# Patient Record
Sex: FEMALE | Race: WHITE | NOT HISPANIC OR LATINO | Employment: OTHER | URBAN - METROPOLITAN AREA
[De-identification: names, ages, dates, MRNs, and addresses within clinical notes are randomized per-mention and may not be internally consistent; named-entity substitution may affect disease eponyms.]

---

## 2017-01-17 ENCOUNTER — OFFICE VISIT (OUTPATIENT)
Dept: FAMILY MEDICINE CLINIC | Facility: CLINIC | Age: 72
End: 2017-01-17

## 2017-01-17 VITALS
HEART RATE: 77 BPM | BODY MASS INDEX: 38.98 KG/M2 | TEMPERATURE: 98.1 F | WEIGHT: 220 LBS | RESPIRATION RATE: 16 BRPM | OXYGEN SATURATION: 97 % | SYSTOLIC BLOOD PRESSURE: 110 MMHG | DIASTOLIC BLOOD PRESSURE: 60 MMHG | HEIGHT: 63 IN

## 2017-01-17 DIAGNOSIS — E55.9 VITAMIN D DEFICIENCY: ICD-10-CM

## 2017-01-17 DIAGNOSIS — I10 ESSENTIAL HYPERTENSION: Primary | ICD-10-CM

## 2017-01-17 DIAGNOSIS — E78.5 HYPERLIPIDEMIA, UNSPECIFIED HYPERLIPIDEMIA TYPE: ICD-10-CM

## 2017-01-17 DIAGNOSIS — I48.91 ATRIAL FIBRILLATION, UNSPECIFIED TYPE (HCC): ICD-10-CM

## 2017-01-17 LAB
25(OH)D3 SERPL-MCNC: 44.8 NG/ML (ref 30–100)
ALBUMIN SERPL-MCNC: 4.6 G/DL (ref 3.5–5.2)
ALBUMIN/GLOB SERPL: 2 G/DL
ALP SERPL-CCNC: 52 U/L (ref 39–117)
ALT SERPL W P-5'-P-CCNC: 11 U/L (ref 1–33)
ANION GAP SERPL CALCULATED.3IONS-SCNC: 16.3 MMOL/L
AST SERPL-CCNC: 11 U/L (ref 1–32)
BILIRUB SERPL-MCNC: 0.6 MG/DL (ref 0.1–1.2)
BUN BLD-MCNC: 13 MG/DL (ref 8–23)
BUN/CREAT SERPL: 15.9 (ref 7–25)
CALCIUM SPEC-SCNC: 10.3 MG/DL (ref 8.6–10.5)
CHLORIDE SERPL-SCNC: 100 MMOL/L (ref 98–107)
CHOLEST SERPL-MCNC: 170 MG/DL (ref 0–200)
CO2 SERPL-SCNC: 27.7 MMOL/L (ref 22–29)
CREAT BLD-MCNC: 0.82 MG/DL (ref 0.57–1)
ERYTHROCYTE [DISTWIDTH] IN BLOOD BY AUTOMATED COUNT: 13.2 % (ref 4.5–15)
GFR SERPL CREATININE-BSD FRML MDRD: 69 ML/MIN/1.73
GLOBULIN UR ELPH-MCNC: 2.3 GM/DL
GLUCOSE BLD-MCNC: 86 MG/DL (ref 65–99)
HCT VFR BLD AUTO: 42.6 % (ref 31–42)
HDLC SERPL-MCNC: 61 MG/DL (ref 40–60)
HGB BLD-MCNC: 13.6 G/DL (ref 12–18)
LDLC SERPL CALC-MCNC: 94 MG/DL (ref 0–100)
LDLC/HDLC SERPL: 1.55 {RATIO}
LYMPHOCYTES # BLD AUTO: 3.2 10*3/MM3 (ref 1.2–3.4)
LYMPHOCYTES NFR BLD AUTO: 42.3 % (ref 21–51)
MCH RBC QN AUTO: 29.2 PG (ref 26.1–33.1)
MCHC RBC AUTO-ENTMCNC: 31.9 G/DL (ref 33–37)
MCV RBC AUTO: 91.4 FL (ref 80–99)
MONOCYTES # BLD AUTO: 0.4 10*3/MM3 (ref 0.1–0.6)
MONOCYTES NFR BLD AUTO: 5.1 % (ref 2–9)
NEUTROPHILS # BLD AUTO: 4 10*3/MM3 (ref 1.4–6.5)
NEUTROPHILS NFR BLD AUTO: 52.6 % (ref 42–75)
PLATELET # BLD AUTO: 252 10*3/MM3 (ref 150–450)
PMV BLD AUTO: 8.9 FL (ref 7.1–10.5)
POTASSIUM BLD-SCNC: 4 MMOL/L (ref 3.5–5.2)
PROT SERPL-MCNC: 6.9 G/DL (ref 6–8.5)
RBC # BLD AUTO: 4.66 10*6/MM3 (ref 4–6)
SODIUM BLD-SCNC: 144 MMOL/L (ref 136–145)
TRIGL SERPL-MCNC: 73 MG/DL (ref 0–150)
VLDLC SERPL-MCNC: 14.6 MG/DL (ref 5–40)
WBC NRBC COR # BLD: 7.6 10*3/MM3 (ref 4.5–10)

## 2017-01-17 PROCEDURE — 80053 COMPREHEN METABOLIC PANEL: CPT | Performed by: INTERNAL MEDICINE

## 2017-01-17 PROCEDURE — 82306 VITAMIN D 25 HYDROXY: CPT | Performed by: INTERNAL MEDICINE

## 2017-01-17 PROCEDURE — 99214 OFFICE O/P EST MOD 30 MIN: CPT | Performed by: INTERNAL MEDICINE

## 2017-01-17 PROCEDURE — 80061 LIPID PANEL: CPT | Performed by: INTERNAL MEDICINE

## 2017-01-17 PROCEDURE — 85025 COMPLETE CBC W/AUTO DIFF WBC: CPT | Performed by: INTERNAL MEDICINE

## 2017-01-17 RX ORDER — HYDROCHLOROTHIAZIDE 12.5 MG/1
12.5 CAPSULE, GELATIN COATED ORAL EVERY MORNING
Qty: 30 CAPSULE | Refills: 3 | Status: SHIPPED | OUTPATIENT
Start: 2017-01-17 | End: 2017-05-23 | Stop reason: SDUPTHER

## 2017-01-17 RX ORDER — OMEPRAZOLE 40 MG/1
40 CAPSULE, DELAYED RELEASE ORAL DAILY
Qty: 30 CAPSULE | Refills: 3 | Status: SHIPPED | OUTPATIENT
Start: 2017-01-17 | End: 2017-06-06 | Stop reason: SDUPTHER

## 2017-01-17 RX ORDER — TRAMADOL HYDROCHLORIDE 50 MG/1
50 TABLET ORAL EVERY 8 HOURS PRN
Qty: 30 TABLET | Refills: 2 | Status: SHIPPED | OUTPATIENT
Start: 2017-01-17 | End: 2017-06-06 | Stop reason: SDUPTHER

## 2017-01-17 RX ORDER — PRAVASTATIN SODIUM 20 MG
20 TABLET ORAL DAILY
Qty: 30 TABLET | Refills: 3 | Status: SHIPPED | OUTPATIENT
Start: 2017-01-17 | End: 2017-05-09 | Stop reason: SDUPTHER

## 2017-01-17 RX ORDER — LISINOPRIL 20 MG/1
20 TABLET ORAL DAILY
Qty: 30 TABLET | Refills: 3 | Status: SHIPPED | OUTPATIENT
Start: 2017-01-17 | End: 2017-05-15 | Stop reason: SDUPTHER

## 2017-01-17 RX ORDER — FLECAINIDE ACETATE 100 MG/1
100 TABLET ORAL 2 TIMES DAILY
Qty: 60 TABLET | Refills: 3 | Status: SHIPPED | OUTPATIENT
Start: 2017-01-17 | End: 2017-05-23 | Stop reason: SDUPTHER

## 2017-01-17 NOTE — PROGRESS NOTES
Subjective   Lili Ruiz is a 71 y.o. female.     History of Present Illness   Patient was seen for follow-up on hypertension.  Her blood pressures been running 120s over 80s at home.  Her lipids is been well-controlled on medication.  She hasn't had any trouble from her atrial fibrillation and is having lab work done to evaluate her lipid level.  She is continue to monitor blood pressure return to our clinic in 4 months.    Much of this encounter note is an electronic transcription/translation of spoken language to printed text.  The electronic translation of spoken language may permit erroneous, or at times, nonsensical words or phrases to be inadvertently transcribed.  Although I have reviewed the note for such errors, some may still exist.  The following portions of the patient's history were reviewed and updated as appropriate: allergies, current medications, past family history, past medical history, past social history, past surgical history and problem list.    Review of Systems   Constitutional: Negative for fatigue and fever.   HENT: Positive for congestion. Negative for trouble swallowing.    Eyes: Negative for discharge and visual disturbance.   Respiratory: Negative for choking and shortness of breath.    Cardiovascular: Negative for chest pain and palpitations.   Gastrointestinal: Negative for abdominal pain and blood in stool.   Endocrine: Negative.    Genitourinary: Negative for genital sores and hematuria.   Musculoskeletal: Negative for gait problem and joint swelling.   Skin: Negative for color change, pallor, rash and wound.   Allergic/Immunologic: Positive for environmental allergies. Negative for immunocompromised state.   Neurological: Negative for facial asymmetry and speech difficulty.   Psychiatric/Behavioral: Negative for hallucinations and suicidal ideas.       Objective   Physical Exam   Constitutional: She is oriented to person, place, and time. She appears well-developed and  well-nourished.   HENT:   Head: Normocephalic.   Eyes: Conjunctivae are normal. Pupils are equal, round, and reactive to light.   Neck: Normal range of motion. Neck supple.   Cardiovascular: Normal rate, regular rhythm and normal heart sounds.    Pulmonary/Chest: Effort normal and breath sounds normal.   Abdominal: Soft. Bowel sounds are normal.   Musculoskeletal: Normal range of motion.   Neurological: She is alert and oriented to person, place, and time.   Skin: Skin is warm and dry.   Psychiatric: She has a normal mood and affect. Her behavior is normal. Judgment and thought content normal.   Nursing note and vitals reviewed.      Assessment/Plan   Problems Addressed this Visit        Cardiovascular and Mediastinum    Hypertension - Primary    Relevant Medications    lisinopril (PRINIVIL,ZESTRIL) 20 MG tablet    hydrochlorothiazide (MICROZIDE) 12.5 MG capsule    Other Relevant Orders    CBC Auto Differential (Completed)    Hyperlipidemia    Relevant Medications    pravastatin (PRAVACHOL) 20 MG tablet    Other Relevant Orders    CBC Auto Differential (Completed)    Atrial fibrillation    Relevant Orders    CBC Auto Differential (Completed)      Other Visit Diagnoses     Vitamin D deficiency         Relevant Orders    Vitamin D 25 Hydroxy

## 2017-01-17 NOTE — MR AVS SNAPSHOT
Lili Ruiz   1/17/2017 5:00 PM   Office Visit    Dept Phone:  [phone]   Encounter #:  [id number]    Provider:  Luis Davis MD   Department:  [facility]                Your Full Care Plan              Today's Medication Changes          These changes are accurate as of: 1/17/17  5:30 PM.  If you have any questions, ask your nurse or doctor.               Medication(s)that have changed:     flecainide 100 MG tablet   Commonly known as:  TAMBOCOR   Take 1 tablet by mouth 2 (Two) Times a Day.   What changed:  See the new instructions.   Changed by:  Luis Davis MD       hydrochlorothiazide 12.5 MG capsule   Commonly known as:  MICROZIDE   Take 1 capsule by mouth Every Morning.   What changed:  See the new instructions.   Changed by:  Luis Davis MD       lisinopril 20 MG tablet   Commonly known as:  PRINIVIL,ZESTRIL   Take 1 tablet by mouth Daily.   What changed:  See the new instructions.   Changed by:  Luis Davis MD       pravastatin 20 MG tablet   Commonly known as:  PRAVACHOL   Take 1 tablet by mouth Daily.   What changed:  See the new instructions.   Changed by:  Luis Davis MD            Where to Get Your Medications      These medications were sent to [facility] [address] [place] [address] [address] [facility] [address]([place] - [phone]  - [phone] FX  [address], [facility] [address]     Phone:  [phone]     flecainide 100 MG tablet    hydrochlorothiazide 12.5 MG capsule    lisinopril 20 MG tablet    omeprazole 40 MG capsule    pravastatin 20 MG tablet         You can get these medications from any pharmacy     Bring a paper prescription for each of these medications     traMADol 50 MG tablet                  Your Updated Medication List          This list is accurate as of: 1/17/17  5:30 PM.  Always use your most recent med list.                anastrozole 1  MG tablet   Commonly known as:  ARIMIDEX       aspirin 325 MG tablet       calcium carbonate 600 MG tablet   Commonly known as:  OS-GEREMIAS       CLARITIN 10 MG capsule   Generic drug:  Loratadine       FISH OIL PO       flecainide 100 MG tablet   Commonly known as:  TAMBOCOR   Take 1 tablet by mouth 2 (Two) Times a Day.       hydrochlorothiazide 12.5 MG capsule   Commonly known as:  MICROZIDE   Take 1 capsule by mouth Every Morning.       lisinopril 20 MG tablet   Commonly known as:  PRINIVIL,ZESTRIL   Take 1 tablet by mouth Daily.       Magnesium 250 MG tablet       omeprazole 40 MG capsule   Commonly known as:  priLOSEC   Take 1 capsule by mouth Daily.       pravastatin 20 MG tablet   Commonly known as:  PRAVACHOL   Take 1 tablet by mouth Daily.       traMADol 50 MG tablet   Commonly known as:  ULTRAM   Take 1 tablet by mouth Every 8 (Eight) Hours As Needed for moderate pain (4-6).       Vitamin D3 2000 UNITS tablet               We Performed the Following     CBC & Differential     CBC Auto Differential     Comprehensive Metabolic Panel     Lipid Panel     Vitamin D 25 Hydroxy       You Were Diagnosed With        Codes Comments    Essential hypertension    -  Primary ICD-10-CM: I10  ICD-9-CM: 401.9     Hyperlipidemia, unspecified hyperlipidemia type     ICD-10-CM: E78.5  ICD-9-CM: 272.4     Atrial fibrillation, unspecified type     ICD-10-CM: I48.91  ICD-9-CM: 427.31     Diverticulosis of small intestine without hemorrhage     ICD-10-CM: K57.10  ICD-9-CM: 562.00     Depression, unspecified depression type     ICD-10-CM: F32.9  ICD-9-CM: 311     Vitamin D deficiency     ICD-10-CM: E55.9  ICD-9-CM: 268.9       Instructions     None    Patient Instructions History      Upcoming Appointments     Visit Type Date Time Department    OFFICE VISIT 1/17/2017  5:00 PM CHIDI NARANJO      [employer] Signup     [facility] [employer] allows you to send messages to your doctor, view your test results, renew your prescriptions,  "schedule appointments, and more. To sign up, go to [email or web address] and click on the Sign Up Now link in the New User? box. Enter your [employer] Activation Code exactly as it appears below along with the last four digits of your Social Security Number and your Date of Birth () to complete the sign-up process. If you do not sign up before the expiration date, you must request a new code.    [employer] Activation Code: [email or web address]  Expires: 2017  5:30 PM    If you have questions, you can email [email or web address] or call [phone] to talk to our [employer] staff. Remember, [employer] is NOT to be used for urgent needs. For medical emergencies, dial 911.               Other Info from Your Visit           Allergies     Hydrocodone-acetaminophen      Sotalol Hcl        Reason for Visit     Med Refill     Hypertension           Vital Signs     Blood Pressure Pulse Temperature Respirations Height Weight    110/60 (BP Location: Left arm, Patient Position: Sitting, Cuff Size: Large Adult) 77 98.1 °F (36.7 °C) (Oral) 16 63\" (160 cm) 220 lb (99.8 kg)    Oxygen Saturation Body Mass Index Smoking Status             97% 38.97 kg/m2 Former Smoker         Problems and Diagnoses Noted     Atrial fibrillation (irregular heartbeat)    Cancer    Depression    Diverticulosis    High cholesterol or triglycerides    High blood pressure    Vitamin D deficiency          Results         "

## 2017-05-09 RX ORDER — PRAVASTATIN SODIUM 20 MG
TABLET ORAL
Qty: 30 TABLET | Refills: 0 | Status: SHIPPED | OUTPATIENT
Start: 2017-05-09 | End: 2017-06-06 | Stop reason: SDUPTHER

## 2017-05-15 RX ORDER — LISINOPRIL 20 MG/1
TABLET ORAL
Qty: 30 TABLET | Refills: 0 | Status: SHIPPED | OUTPATIENT
Start: 2017-05-15 | End: 2017-06-06 | Stop reason: SDUPTHER

## 2017-05-23 RX ORDER — FLECAINIDE ACETATE 100 MG/1
TABLET ORAL
Qty: 60 TABLET | Refills: 0 | Status: SHIPPED | OUTPATIENT
Start: 2017-05-23 | End: 2017-06-06 | Stop reason: SDUPTHER

## 2017-05-23 RX ORDER — HYDROCHLOROTHIAZIDE 12.5 MG/1
CAPSULE, GELATIN COATED ORAL
Qty: 30 CAPSULE | Refills: 0 | Status: SHIPPED | OUTPATIENT
Start: 2017-05-23 | End: 2017-06-06 | Stop reason: SDUPTHER

## 2017-06-06 ENCOUNTER — OFFICE VISIT (OUTPATIENT)
Dept: FAMILY MEDICINE CLINIC | Facility: CLINIC | Age: 72
End: 2017-06-06

## 2017-06-06 VITALS
DIASTOLIC BLOOD PRESSURE: 70 MMHG | SYSTOLIC BLOOD PRESSURE: 120 MMHG | BODY MASS INDEX: 30.83 KG/M2 | HEART RATE: 61 BPM | RESPIRATION RATE: 15 BRPM | HEIGHT: 63 IN | OXYGEN SATURATION: 100 % | WEIGHT: 174 LBS | TEMPERATURE: 98.5 F

## 2017-06-06 DIAGNOSIS — I48.20 CHRONIC ATRIAL FIBRILLATION (HCC): ICD-10-CM

## 2017-06-06 DIAGNOSIS — E55.9 VITAMIN D DEFICIENCY: ICD-10-CM

## 2017-06-06 DIAGNOSIS — E78.2 MIXED HYPERLIPIDEMIA: ICD-10-CM

## 2017-06-06 DIAGNOSIS — I10 ESSENTIAL HYPERTENSION: ICD-10-CM

## 2017-06-06 DIAGNOSIS — Z00.00 MEDICARE ANNUAL WELLNESS VISIT, INITIAL: Primary | ICD-10-CM

## 2017-06-06 PROCEDURE — G0438 PPPS, INITIAL VISIT: HCPCS | Performed by: INTERNAL MEDICINE

## 2017-06-06 PROCEDURE — 99213 OFFICE O/P EST LOW 20 MIN: CPT | Performed by: INTERNAL MEDICINE

## 2017-06-06 RX ORDER — FLECAINIDE ACETATE 100 MG/1
100 TABLET ORAL 2 TIMES DAILY
Qty: 60 TABLET | Refills: 3 | Status: SHIPPED | OUTPATIENT
Start: 2017-06-06 | End: 2017-09-26 | Stop reason: SDUPTHER

## 2017-06-06 RX ORDER — HYDROCHLOROTHIAZIDE 12.5 MG/1
12.5 CAPSULE, GELATIN COATED ORAL EVERY MORNING
Qty: 30 CAPSULE | Refills: 3 | Status: SHIPPED | OUTPATIENT
Start: 2017-06-06 | End: 2017-09-26 | Stop reason: SDUPTHER

## 2017-06-06 RX ORDER — LISINOPRIL 20 MG/1
20 TABLET ORAL DAILY
Qty: 30 TABLET | Refills: 3 | Status: SHIPPED | OUTPATIENT
Start: 2017-06-06 | End: 2017-09-26 | Stop reason: SDUPTHER

## 2017-06-06 RX ORDER — TRAMADOL HYDROCHLORIDE 50 MG/1
50 TABLET ORAL EVERY 8 HOURS PRN
Qty: 30 TABLET | Refills: 2 | Status: SHIPPED | OUTPATIENT
Start: 2017-06-06 | End: 2017-09-26 | Stop reason: SDUPTHER

## 2017-06-06 RX ORDER — PRAVASTATIN SODIUM 20 MG
20 TABLET ORAL DAILY
Qty: 30 TABLET | Refills: 3 | Status: SHIPPED | OUTPATIENT
Start: 2017-06-06 | End: 2017-09-26 | Stop reason: SDUPTHER

## 2017-06-06 RX ORDER — OMEPRAZOLE 40 MG/1
40 CAPSULE, DELAYED RELEASE ORAL DAILY
Qty: 30 CAPSULE | Refills: 3 | Status: SHIPPED | OUTPATIENT
Start: 2017-06-06 | End: 2017-09-26 | Stop reason: SDUPTHER

## 2017-06-06 NOTE — PROGRESS NOTES
Subjective   Lili Ruiz is a 72 y.o. female.     History of Present Illness   She was then for a Medicare wellness check.  He also had an umbilical cellulitis and lipoma along the right hip.  She did not want surgery at this time.  She was given Bactroban cream for her umbilical cellulitis and asked to follow-up in one week if not better.  Her chronic atrial fibrillation is been stable over the last several months.  Her lipid status been controlled diet and medication.  Her blood pressure remained stable in the 110s to 120s over 80s.  Her vitamin D level is being assessed with labs.    Much of this encounter note is an electronic transcription/translation of spoken language to printed text.  The electronic translation of spoken language may permit erroneous, or at times, nonsensical words or phrases to be inadvertently transcribed.  Although I have reviewed the note for such errors, some may still exist.  The following portions of the patient's history were reviewed and updated as appropriate: allergies, current medications, past family history, past medical history, past social history, past surgical history and problem list.    Review of Systems   Constitutional: Negative for fatigue and fever.   HENT: Positive for congestion. Negative for trouble swallowing.    Eyes: Negative for discharge and visual disturbance.   Respiratory: Negative for choking and shortness of breath.    Cardiovascular: Negative for chest pain and palpitations.   Gastrointestinal: Negative for abdominal pain and blood in stool.   Endocrine: Negative.    Genitourinary: Negative for genital sores and hematuria.   Musculoskeletal: Negative for gait problem and joint swelling.   Skin: Negative for color change, pallor, rash and wound.        Umbilical cellulitis   Allergic/Immunologic: Positive for environmental allergies. Negative for immunocompromised state.   Neurological: Negative for facial asymmetry and speech difficulty.    Psychiatric/Behavioral: Negative for hallucinations and suicidal ideas.       Objective   Physical Exam   Constitutional: She is oriented to person, place, and time. She appears well-developed and well-nourished.   HENT:   Head: Normocephalic.   Eyes: Conjunctivae are normal. Pupils are equal, round, and reactive to light.   Neck: Normal range of motion. Neck supple.   Cardiovascular: Normal rate, regular rhythm and normal heart sounds.    Pulmonary/Chest: Effort normal and breath sounds normal.   Abdominal: Soft. Bowel sounds are normal.   Musculoskeletal: Normal range of motion.   Neurological: She is alert and oriented to person, place, and time.   Skin: Skin is warm and dry.   Umbilical cellulitis   Psychiatric: She has a normal mood and affect. Her behavior is normal. Judgment and thought content normal.   Nursing note and vitals reviewed.      Assessment/Plan   Problems Addressed this Visit        Cardiovascular and Mediastinum    Hypertension    Relevant Medications    lisinopril (PRINIVIL,ZESTRIL) 20 MG tablet    hydrochlorothiazide (MICROZIDE) 12.5 MG capsule    Other Relevant Orders    CBC & Differential    Comprehensive Metabolic Panel    Lipid Panel    Vitamin D 25 Hydroxy    Hyperlipidemia    Relevant Medications    pravastatin (PRAVACHOL) 20 MG tablet    Other Relevant Orders    CBC & Differential    Comprehensive Metabolic Panel    Lipid Panel    Vitamin D 25 Hydroxy    Atrial fibrillation    Relevant Orders    CBC & Differential    Comprehensive Metabolic Panel    Lipid Panel    Vitamin D 25 Hydroxy      Other Visit Diagnoses     Medicare annual wellness visit, initial    -  Primary    Vitamin D deficiency         Relevant Orders    Vitamin D 25 Hydroxy

## 2017-06-06 NOTE — PATIENT INSTRUCTIONS
"DASH Eating Plan  DASH stands for \"Dietary Approaches to Stop Hypertension.\" The DASH eating plan is a healthy eating plan that has been shown to reduce high blood pressure (hypertension). Additional health benefits may include reducing the risk of type 2 diabetes mellitus, heart disease, and stroke. The DASH eating plan may also help with weight loss.  WHAT DO I NEED TO KNOW ABOUT THE DASH EATING PLAN?  For the DASH eating plan, you will follow these general guidelines:  · Choose foods with less than 150 milligrams of sodium per serving (as listed on the food label).  · Use salt-free seasonings or herbs instead of table salt or sea salt.  · Check with your health care provider or pharmacist before using salt substitutes.  · Eat lower-sodium products. These are often labeled as \"low-sodium\" or \"no salt added.\"  · Eat fresh foods. Avoid eating a lot of canned foods.  · Eat more vegetables, fruits, and low-fat dairy products.  · Choose whole grains. Look for the word \"whole\" as the first word in the ingredient list.  · Choose fish and skinless chicken or turkey more often than red meat. Limit fish, poultry, and meat to 6 oz (170 g) each day.  · Limit sweets, desserts, sugars, and sugary drinks.  · Choose heart-healthy fats.  · Eat more home-cooked food and less restaurant, buffet, and fast food.  · Limit fried foods.  · Do not cota foods. Cook foods using methods such as baking, boiling, grilling, and broiling instead.  · When eating at a restaurant, ask that your food be prepared with less salt, or no salt if possible.  WHAT FOODS CAN I EAT?  Seek help from a dietitian for individual calorie needs.  Grains  Whole grain or whole wheat bread. Brown rice. Whole grain or whole wheat pasta. Quinoa, bulgur, and whole grain cereals. Low-sodium cereals. Corn or whole wheat flour tortillas. Whole grain cornbread. Whole grain crackers. Low-sodium crackers.  Vegetables  Fresh or frozen vegetables (raw, steamed, roasted, or " grilled). Low-sodium or reduced-sodium tomato and vegetable juices. Low-sodium or reduced-sodium tomato sauce and paste. Low-sodium or reduced-sodium canned vegetables.   Fruits  All fresh, canned (in natural juice), or frozen fruits.  Meat and Other Protein Products  Ground beef (85% or leaner), grass-fed beef, or beef trimmed of fat. Skinless chicken or turkey. Ground chicken or turkey. Pork trimmed of fat. All fish and seafood. Eggs. Dried beans, peas, or lentils. Unsalted nuts and seeds. Unsalted canned beans.  Dairy  Low-fat dairy products, such as skim or 1% milk, 2% or reduced-fat cheeses, low-fat ricotta or cottage cheese, or plain low-fat yogurt. Low-sodium or reduced-sodium cheeses.  Fats and Oils  Tub margarines without trans fats. Light or reduced-fat mayonnaise and salad dressings (reduced sodium). Avocado. Safflower, olive, or canola oils. Natural peanut or almond butter.  Other  Unsalted popcorn and pretzels.  The items listed above may not be a complete list of recommended foods or beverages. Contact your dietitian for more options.  WHAT FOODS ARE NOT RECOMMENDED?  Grains  White bread. White pasta. White rice. Refined cornbread. Bagels and croissants. Crackers that contain trans fat.  Vegetables  Creamed or fried vegetables. Vegetables in a cheese sauce. Regular canned vegetables. Regular canned tomato sauce and paste. Regular tomato and vegetable juices.  Fruits  Canned fruit in light or heavy syrup. Fruit juice.  Meat and Other Protein Products  Fatty cuts of meat. Ribs, chicken wings, benites, sausage, bologna, salami, chitterlings, fatback, hot dogs, bratwurst, and packaged luncheon meats. Salted nuts and seeds. Canned beans with salt.  Dairy  Whole or 2% milk, cream, half-and-half, and cream cheese. Whole-fat or sweetened yogurt. Full-fat cheeses or blue cheese. Nondairy creamers and whipped toppings. Processed cheese, cheese spreads, or cheese curds.  Condiments  Onion and garlic salt, seasoned  salt, table salt, and sea salt. Canned and packaged gravies. Worcestershire sauce. Tartar sauce. Barbecue sauce. Teriyaki sauce. Soy sauce, including reduced sodium. Steak sauce. Fish sauce. Oyster sauce. Cocktail sauce. Horseradish. Ketchup and mustard. Meat flavorings and tenderizers. Bouillon cubes. Hot sauce. Tabasco sauce. Marinades. Taco seasonings. Relishes.  Fats and Oils  Butter, stick margarine, lard, shortening, ghee, and benites fat. Coconut, palm kernel, or palm oils. Regular salad dressings.  Other  Pickles and olives. Salted popcorn and pretzels.  The items listed above may not be a complete list of foods and beverages to avoid. Contact your dietitian for more information.  WHERE CAN I FIND MORE INFORMATION?  National Heart, [employer], and [employer] [place]: www.nhlbi.nih.gov/health/health-topics/topics/dash/     This information is not intended to replace advice given to you by your health care provider. Make sure you discuss any questions you have with your health care provider.     Document Released: 12/06/2012 Document Revised: 04/10/2017 Document Reviewed: 10/22/2014  Elsevier Interactive Patient Education ©2017 [employer]

## 2017-06-06 NOTE — PROGRESS NOTES
QUICK REFERENCE INFORMATION:  The ABCs of the Annual Wellness Visit    Initial Medicare Wellness Visit    HEALTH RISK ASSESSMENT    1945    Recent Hospitalizations:  No hospitalization(s) within the last year..        Current Medical Providers:  Patient Care Team:  Luis Davis MD as PCP - General  Luis Davis MD as PCP - Family Medicine  Luis Davis MD as PCP - Claims Attributed  Shamar Caban MD as Consulting Physician (Medical Oncology)  Lee Heredia MD as Surgeon (General Surgery)  Khris Lewis II, MD as Consulting Physician (Interventional Cardiology)        Smoking Status:  History   Smoking Status   • Former Smoker   Smokeless Tobacco   • Not on file       Alcohol Consumption:  History   Alcohol Use No       Depression Screen:   PHQ-9 Depression Screening 6/6/2017   Little interest or pleasure in doing things 0   Feeling down, depressed, or hopeless 0   Trouble falling or staying asleep, or sleeping too much 0   Feeling tired or having little energy 0   Poor appetite or overeating 0   Feeling bad about yourself - or that you are a failure or have let yourself or your family down 0   Trouble concentrating on things, such as reading the newspaper or watching television 0   Moving or speaking so slowly that other people could have noticed. Or the opposite - being so fidgety or restless that you have been moving around a lot more than usual 0   Thoughts that you would be better off dead, or of hurting yourself in some way 0   PHQ-9 Total Score 0   If you checked off any problems, how difficult have these problems made it for you to do your work, take care of things at home, or get along with other people? Not difficult at all       Health Habits and Functional and Cognitive Screening:  Functional & Cognitive Status 6/6/2017   Do you have difficulty preparing food and eating? No   Do you have difficulty bathing yourself? No   Do you have difficulty getting dressed? No   Do you have  difficulty using the toilet? No   Do you have difficulty moving around from place to place? No   In the past year have you fallen or experienced a near fall? No   Do you need help using the phone?  No   Are you deaf or do you have serious difficulty hearing?  No   Do you need help with transportation? No   Do you need help shopping? No   Do you need help preparing meals?  No   Do you need help with housework?  No   Do you need help with laundry? No   Do you need help taking your medications? No   Do you need help managing money? No   Do you have difficulty concentrating, remembering or making decisions? No       Health Habits  Current Diet: Well Balanced Diet  Dental Exam: Not up to date  Eye Exam: Up to date  Exercise (times per week): 5 times per week  Current Exercise Activities Include: Walking          Does the patient have evidence of cognitive impairment? Yes    Asiprin use counseling: Taking ASA appropriately as indicated      Recent Lab Results:    Visual Acuity:  No exam data present    Age-appropriate Screening Schedule:  Refer to the list below for future screening recommendations based on patient's age, sex and/or medical conditions. Orders for these recommended tests are listed in the plan section. The patient has been provided with a written plan.    Health Maintenance   Topic Date Due   • INFLUENZA VACCINE  08/01/2017   • PNEUMOCOCCAL VACCINES (65+ LOW/MEDIUM RISK) (2 of 2 - PPSV23) 11/01/2017   • LIPID PANEL  01/17/2018   • MAMMOGRAM  10/22/2018   • COLONOSCOPY  01/17/2027   • TDAP/TD VACCINES (2 - Td) 01/17/2027   • ZOSTER VACCINE  Addressed        Subjective   History of Present Illness    Lili Ruiz is a 72 y.o. female who presents for an Annual Wellness Visit.    The following portions of the patient's history were reviewed and updated as appropriate: allergies, current medications, past family history, past medical history, past social history, past surgical history and problem  list.    Outpatient Medications Prior to Visit   Medication Sig Dispense Refill   • anastrozole (ARIMIDEX) 1 MG chemo tablet TAKE 1 TABLET BY MOUTH DAILY  11   • aspirin 325 MG tablet Take 325 mg by mouth daily.     • calcium carbonate (OS-GEREMIAS) 600 MG tablet Take 600 mg by mouth daily.     • Cholecalciferol (VITAMIN D3) 2000 UNITS tablet Take 1 tablet by mouth 2 (two) times a day.     • Loratadine (CLARITIN) 10 MG capsule Take 10 tablets by mouth daily as needed.     • Magnesium 250 MG tablet Take 1 tablet by mouth daily.     • Omega-3 Fatty Acids (FISH OIL PO) Take 1 tablet by mouth 2 (two) times a day.     • flecainide (TAMBOCOR) 100 MG tablet TAKE 1 TABLET BY MOUTH TWICE DAILY 60 tablet 0   • hydrochlorothiazide (MICROZIDE) 12.5 MG capsule TAKE 1 CAPSULE BY MOUTH EVERY MORNING 30 capsule 0   • lisinopril (PRINIVIL,ZESTRIL) 20 MG tablet TAKE 1 TABLET BY MOUTH DAILY 30 tablet 0   • omeprazole (priLOSEC) 40 MG capsule Take 1 capsule by mouth Daily. 30 capsule 3   • pravastatin (PRAVACHOL) 20 MG tablet TAKE 1 TABLET BY MOUTH DAILY 30 tablet 0   • traMADol (ULTRAM) 50 MG tablet Take 1 tablet by mouth Every 8 (Eight) Hours As Needed for moderate pain (4-6). 30 tablet 2     No facility-administered medications prior to visit.        Patient Active Problem List   Diagnosis   • Visual impairment   • Peptic ulceration   • Obesity   • Low back pain   • Kidney stone   • Hypertension   • Hyperlipidemia   • Diverticulosis   • Depression   • Cancer   • Atrial fibrillation   • Arthritis   • Anxiety   • Human metapneumovirus (hMPV) pneumonia       Advance Care Planning:  has an advance directive - a copy HAS NOT been provided. Have asked the patient to send this to us to add to record.    Identification of Risk Factors:  Risk factors include: NA.    Review of Systems    Compared to one year ago, the patient feels her physical health is better.  Compared to one year ago, the patient feels her mental health is better.    Objective   "   Physical Exam    Vitals:    06/06/17 1714   BP: 120/70   BP Location: Left arm   Patient Position: Sitting   Cuff Size: Large Adult   Pulse: 61   Resp: 15   Temp: 98.5 °F (36.9 °C)   TempSrc: Oral   SpO2: 100%   Weight: 174 lb (78.9 kg)   Height: 63\" (160 cm)   PainSc: 0-No pain       Body mass index is 30.82 kg/(m^2).  Discussed the patient's BMI with her. The BMI is in the acceptable range.    Assessment/Plan   Patient Self-Management and Personalized Health Advice  The patient has been provided with information about: NA and preventive services including:   · NA.    Visit Diagnoses:    ICD-10-CM ICD-9-CM   1. Essential hypertension I10 401.9   2. Mixed hyperlipidemia E78.2 272.2   3. Chronic atrial fibrillation I48.2 427.31   4. Vitamin D deficiency  E55.9 268.9       Orders Placed This Encounter   Procedures   • Comprehensive Metabolic Panel     Standing Status:   Future   • Lipid Panel     Standing Status:   Future   • Vitamin D 25 Hydroxy     Standing Status:   Future   • CBC & Differential     Standing Status:   Future     Order Specific Question:   Manual Differential     Answer:   No       Outpatient Encounter Prescriptions as of 6/6/2017   Medication Sig Dispense Refill   • anastrozole (ARIMIDEX) 1 MG chemo tablet TAKE 1 TABLET BY MOUTH DAILY  11   • aspirin 325 MG tablet Take 325 mg by mouth daily.     • calcium carbonate (OS-GEREMIAS) 600 MG tablet Take 600 mg by mouth daily.     • Cholecalciferol (VITAMIN D3) 2000 UNITS tablet Take 1 tablet by mouth 2 (two) times a day.     • flecainide (TAMBOCOR) 100 MG tablet Take 1 tablet by mouth 2 (Two) Times a Day. 60 tablet 3   • hydrochlorothiazide (MICROZIDE) 12.5 MG capsule Take 1 capsule by mouth Every Morning. 30 capsule 3   • lisinopril (PRINIVIL,ZESTRIL) 20 MG tablet Take 1 tablet by mouth Daily. 30 tablet 3   • Loratadine (CLARITIN) 10 MG capsule Take 10 tablets by mouth daily as needed.     • Magnesium 250 MG tablet Take 1 tablet by mouth daily.     • " Omega-3 Fatty Acids (FISH OIL PO) Take 1 tablet by mouth 2 (two) times a day.     • omeprazole (priLOSEC) 40 MG capsule Take 1 capsule by mouth Daily. 30 capsule 3   • pravastatin (PRAVACHOL) 20 MG tablet Take 1 tablet by mouth Daily. 30 tablet 3   • traMADol (ULTRAM) 50 MG tablet Take 1 tablet by mouth Every 8 (Eight) Hours As Needed for Moderate Pain (4-6). 30 tablet 2   • [DISCONTINUED] flecainide (TAMBOCOR) 100 MG tablet TAKE 1 TABLET BY MOUTH TWICE DAILY 60 tablet 0   • [DISCONTINUED] hydrochlorothiazide (MICROZIDE) 12.5 MG capsule TAKE 1 CAPSULE BY MOUTH EVERY MORNING 30 capsule 0   • [DISCONTINUED] lisinopril (PRINIVIL,ZESTRIL) 20 MG tablet TAKE 1 TABLET BY MOUTH DAILY 30 tablet 0   • [DISCONTINUED] omeprazole (priLOSEC) 40 MG capsule Take 1 capsule by mouth Daily. 30 capsule 3   • [DISCONTINUED] pravastatin (PRAVACHOL) 20 MG tablet TAKE 1 TABLET BY MOUTH DAILY 30 tablet 0   • [DISCONTINUED] traMADol (ULTRAM) 50 MG tablet Take 1 tablet by mouth Every 8 (Eight) Hours As Needed for moderate pain (4-6). 30 tablet 2     No facility-administered encounter medications on file as of 6/6/2017.        Reviewed use of high risk medication in the elderly: not applicable  Reviewed for potential of harmful drug interactions in the elderly: not applicable    Follow Up:  No Follow-up on file.     An After Visit Summary and PPPS with all of these plans were given to the patient.

## 2017-09-11 ENCOUNTER — TRANSCRIBE ORDERS (OUTPATIENT)
Dept: ADMINISTRATIVE | Facility: HOSPITAL | Age: 72
End: 2017-09-11

## 2017-09-11 DIAGNOSIS — Z12.31 VISIT FOR SCREENING MAMMOGRAM: Primary | ICD-10-CM

## 2017-09-26 ENCOUNTER — OFFICE VISIT (OUTPATIENT)
Dept: FAMILY MEDICINE CLINIC | Facility: CLINIC | Age: 72
End: 2017-09-26

## 2017-09-26 VITALS
HEIGHT: 63 IN | TEMPERATURE: 98.9 F | WEIGHT: 155.6 LBS | HEART RATE: 71 BPM | SYSTOLIC BLOOD PRESSURE: 118 MMHG | DIASTOLIC BLOOD PRESSURE: 66 MMHG | BODY MASS INDEX: 27.57 KG/M2 | OXYGEN SATURATION: 99 %

## 2017-09-26 DIAGNOSIS — E55.9 VITAMIN D DEFICIENCY: ICD-10-CM

## 2017-09-26 DIAGNOSIS — I10 ESSENTIAL HYPERTENSION: Primary | ICD-10-CM

## 2017-09-26 DIAGNOSIS — E78.2 MIXED HYPERLIPIDEMIA: ICD-10-CM

## 2017-09-26 DIAGNOSIS — I48.20 CHRONIC ATRIAL FIBRILLATION (HCC): ICD-10-CM

## 2017-09-26 LAB
25(OH)D3 SERPL-MCNC: 59.5 NG/ML (ref 30–100)
ALBUMIN SERPL-MCNC: 4.1 G/DL (ref 3.5–5.2)
ALBUMIN/GLOB SERPL: 1.6 G/DL
ALP SERPL-CCNC: 49 U/L (ref 39–117)
ALT SERPL W P-5'-P-CCNC: 14 U/L (ref 1–33)
ANION GAP SERPL CALCULATED.3IONS-SCNC: 13.5 MMOL/L
AST SERPL-CCNC: 11 U/L (ref 1–32)
BILIRUB SERPL-MCNC: 0.5 MG/DL (ref 0.1–1.2)
BUN BLD-MCNC: 12 MG/DL (ref 8–23)
BUN/CREAT SERPL: 16.9 (ref 7–25)
CALCIUM SPEC-SCNC: 10.5 MG/DL (ref 8.6–10.5)
CHLORIDE SERPL-SCNC: 101 MMOL/L (ref 98–107)
CHOLEST SERPL-MCNC: 179 MG/DL (ref 0–200)
CO2 SERPL-SCNC: 26.5 MMOL/L (ref 22–29)
CREAT BLD-MCNC: 0.71 MG/DL (ref 0.57–1)
ERYTHROCYTE [DISTWIDTH] IN BLOOD BY AUTOMATED COUNT: 12.7 % (ref 4.5–15)
GFR SERPL CREATININE-BSD FRML MDRD: 81 ML/MIN/1.73
GLOBULIN UR ELPH-MCNC: 2.6 GM/DL
GLUCOSE BLD-MCNC: 79 MG/DL (ref 65–99)
HCT VFR BLD AUTO: 40.5 % (ref 31–42)
HDLC SERPL-MCNC: 62 MG/DL (ref 40–60)
HGB BLD-MCNC: 13 G/DL (ref 12–18)
LDLC SERPL CALC-MCNC: 104 MG/DL (ref 0–100)
LDLC/HDLC SERPL: 1.68 {RATIO}
LYMPHOCYTES # BLD AUTO: 3 10*3/MM3 (ref 1.2–3.4)
LYMPHOCYTES NFR BLD AUTO: 43.9 % (ref 21–51)
MCH RBC QN AUTO: 29.9 PG (ref 26.1–33.1)
MCHC RBC AUTO-ENTMCNC: 32.1 G/DL (ref 33–37)
MCV RBC AUTO: 93.1 FL (ref 80–99)
MONOCYTES # BLD AUTO: 0.4 10*3/MM3 (ref 0.1–0.6)
MONOCYTES NFR BLD AUTO: 5.7 % (ref 2–9)
NEUTROPHILS # BLD AUTO: 3.5 10*3/MM3 (ref 1.4–6.5)
NEUTROPHILS NFR BLD AUTO: 50.4 % (ref 42–75)
PLATELET # BLD AUTO: 210 10*3/MM3 (ref 150–450)
PMV BLD AUTO: 8.4 FL (ref 7.1–10.5)
POTASSIUM BLD-SCNC: 4.5 MMOL/L (ref 3.5–5.2)
PROT SERPL-MCNC: 6.7 G/DL (ref 6–8.5)
RBC # BLD AUTO: 4.35 10*6/MM3 (ref 4–6)
SODIUM BLD-SCNC: 141 MMOL/L (ref 136–145)
T-UPTAKE NFR SERPL: 1.08 TBI (ref 0.8–1.3)
T4 SERPL-MCNC: 6.68 MCG/DL (ref 4.5–11.7)
TRIGL SERPL-MCNC: 64 MG/DL (ref 0–150)
TSH SERPL DL<=0.05 MIU/L-ACNC: 1.92 MIU/ML (ref 0.27–4.2)
VLDLC SERPL-MCNC: 12.8 MG/DL (ref 5–40)
WBC NRBC COR # BLD: 6.9 10*3/MM3 (ref 4.5–10)

## 2017-09-26 PROCEDURE — 80061 LIPID PANEL: CPT | Performed by: INTERNAL MEDICINE

## 2017-09-26 PROCEDURE — 80053 COMPREHEN METABOLIC PANEL: CPT | Performed by: INTERNAL MEDICINE

## 2017-09-26 PROCEDURE — 90662 IIV NO PRSV INCREASED AG IM: CPT | Performed by: INTERNAL MEDICINE

## 2017-09-26 PROCEDURE — 84479 ASSAY OF THYROID (T3 OR T4): CPT | Performed by: INTERNAL MEDICINE

## 2017-09-26 PROCEDURE — 84443 ASSAY THYROID STIM HORMONE: CPT | Performed by: INTERNAL MEDICINE

## 2017-09-26 PROCEDURE — 84436 ASSAY OF TOTAL THYROXINE: CPT | Performed by: INTERNAL MEDICINE

## 2017-09-26 PROCEDURE — G0008 ADMIN INFLUENZA VIRUS VAC: HCPCS | Performed by: INTERNAL MEDICINE

## 2017-09-26 PROCEDURE — 85025 COMPLETE CBC W/AUTO DIFF WBC: CPT | Performed by: INTERNAL MEDICINE

## 2017-09-26 PROCEDURE — 36415 COLL VENOUS BLD VENIPUNCTURE: CPT | Performed by: INTERNAL MEDICINE

## 2017-09-26 PROCEDURE — 99214 OFFICE O/P EST MOD 30 MIN: CPT | Performed by: INTERNAL MEDICINE

## 2017-09-26 PROCEDURE — 82306 VITAMIN D 25 HYDROXY: CPT | Performed by: INTERNAL MEDICINE

## 2017-09-26 RX ORDER — TRAMADOL HYDROCHLORIDE 50 MG/1
50 TABLET ORAL EVERY 8 HOURS PRN
Qty: 30 TABLET | Refills: 2 | Status: SHIPPED | OUTPATIENT
Start: 2017-09-26 | End: 2018-01-25 | Stop reason: SDUPTHER

## 2017-09-26 RX ORDER — PRAVASTATIN SODIUM 20 MG
20 TABLET ORAL DAILY
Qty: 30 TABLET | Refills: 3 | Status: SHIPPED | OUTPATIENT
Start: 2017-09-26 | End: 2018-01-25

## 2017-09-26 RX ORDER — FLECAINIDE ACETATE 100 MG/1
100 TABLET ORAL 2 TIMES DAILY
Qty: 60 TABLET | Refills: 3 | Status: SHIPPED | OUTPATIENT
Start: 2017-09-26 | End: 2018-01-25 | Stop reason: SDUPTHER

## 2017-09-26 RX ORDER — OMEPRAZOLE 40 MG/1
40 CAPSULE, DELAYED RELEASE ORAL DAILY
Qty: 30 CAPSULE | Refills: 3 | Status: SHIPPED | OUTPATIENT
Start: 2017-09-26 | End: 2018-01-25

## 2017-09-26 RX ORDER — LISINOPRIL 20 MG/1
20 TABLET ORAL DAILY
Qty: 30 TABLET | Refills: 3 | Status: SHIPPED | OUTPATIENT
Start: 2017-09-26 | End: 2018-01-25

## 2017-09-26 RX ORDER — HYDROCHLOROTHIAZIDE 12.5 MG/1
12.5 CAPSULE, GELATIN COATED ORAL EVERY MORNING
Qty: 30 CAPSULE | Refills: 3 | Status: SHIPPED | OUTPATIENT
Start: 2017-09-26 | End: 2018-01-25 | Stop reason: SDUPTHER

## 2017-09-26 NOTE — PROGRESS NOTES
Subjective   Lili Ruiz is a 72 y.o. female.     History of Present Illness   Patient was seen for hypertension.  Blood pressure is running 120s over 80s at home.  Her cholesterol is being controlled with diet, medication and exercise.  Her chronic atrial fibrillation is being monitored by her cardiologist is doing extremely well this time.    Much of this encounter note is an electronic transcription/translation of spoken language to printed text.  The electronic translation of spoken language may permit erroneous, or at times, nonsensical words or phrases to be inadvertently transcribed.  Although I have reviewed the note for such errors, some may still exist.  The following portions of the patient's history were reviewed and updated as appropriate: allergies, current medications, past family history, past medical history, past social history, past surgical history and problem list.    Review of Systems   Constitutional: Negative for fatigue and fever.   HENT: Positive for congestion. Negative for trouble swallowing.    Eyes: Negative for discharge and visual disturbance.   Respiratory: Negative for choking and shortness of breath.    Cardiovascular: Negative for chest pain and palpitations.   Gastrointestinal: Negative for abdominal pain and blood in stool.   Endocrine: Negative.    Genitourinary: Negative for genital sores and hematuria.   Musculoskeletal: Negative for gait problem and joint swelling.   Skin: Negative for color change, pallor, rash and wound.   Allergic/Immunologic: Positive for environmental allergies. Negative for immunocompromised state.   Neurological: Negative for facial asymmetry and speech difficulty.   Psychiatric/Behavioral: Negative for hallucinations and suicidal ideas.       Objective   Physical Exam   Constitutional: She is oriented to person, place, and time. She appears well-developed and well-nourished.   HENT:   Head: Normocephalic.   Eyes: Conjunctivae are normal. Pupils  are equal, round, and reactive to light.   Neck: Normal range of motion. Neck supple.   Cardiovascular: Normal rate, regular rhythm and normal heart sounds.    Pulmonary/Chest: Effort normal and breath sounds normal.   Abdominal: Soft. Bowel sounds are normal.   Musculoskeletal: Normal range of motion.   Neurological: She is alert and oriented to person, place, and time.   Skin: Skin is warm and dry.   Psychiatric: She has a normal mood and affect. Her behavior is normal. Judgment and thought content normal.   Nursing note and vitals reviewed.      Assessment/Plan   Problems Addressed this Visit        Cardiovascular and Mediastinum    Hypertension - Primary    Relevant Medications    lisinopril (PRINIVIL,ZESTRIL) 20 MG tablet    hydrochlorothiazide (MICROZIDE) 12.5 MG capsule    Other Relevant Orders    Thyroid Panel With TSH    CBC Auto Differential (Completed)    Hyperlipidemia    Relevant Medications    pravastatin (PRAVACHOL) 20 MG tablet    Other Relevant Orders    CBC Auto Differential (Completed)    Atrial fibrillation    Relevant Orders    CBC Auto Differential (Completed)      Other Visit Diagnoses     Vitamin D deficiency

## 2017-10-03 ENCOUNTER — OFFICE VISIT (OUTPATIENT)
Dept: FAMILY MEDICINE CLINIC | Facility: CLINIC | Age: 72
End: 2017-10-03

## 2017-10-03 VITALS
TEMPERATURE: 98.9 F | SYSTOLIC BLOOD PRESSURE: 102 MMHG | WEIGHT: 161.8 LBS | OXYGEN SATURATION: 98 % | BODY MASS INDEX: 28.67 KG/M2 | DIASTOLIC BLOOD PRESSURE: 68 MMHG | HEART RATE: 65 BPM | HEIGHT: 63 IN

## 2017-10-03 DIAGNOSIS — Z01.419 WOMEN'S ANNUAL ROUTINE GYNECOLOGICAL EXAMINATION: Primary | ICD-10-CM

## 2017-10-03 DIAGNOSIS — H61.23 BILATERAL IMPACTED CERUMEN: ICD-10-CM

## 2017-10-03 PROBLEM — I65.29 CAROTID ARTERY STENOSIS: Status: ACTIVE | Noted: 2017-10-03

## 2017-10-03 PROBLEM — I47.1 PAROXYSMAL SUPRAVENTRICULAR TACHYCARDIA (HCC): Status: ACTIVE | Noted: 2017-10-03

## 2017-10-03 PROBLEM — I47.10 PAROXYSMAL SUPRAVENTRICULAR TACHYCARDIA: Status: ACTIVE | Noted: 2017-10-03

## 2017-10-03 PROCEDURE — G0101 CA SCREEN;PELVIC/BREAST EXAM: HCPCS | Performed by: NURSE PRACTITIONER

## 2017-10-03 PROCEDURE — 69209 REMOVE IMPACTED EAR WAX UNI: CPT | Performed by: NURSE PRACTITIONER

## 2017-10-03 NOTE — PROGRESS NOTES
Subjective   Lili Ruiz is a 72 y.o. female.     History of Present Illness   Lili Ruiz 72 y.o. female is  2, Para 1 45 yr son, 1 ectopic pregnancy who presents for annual exam. The patient has no complaints today of vag dc, odor, itch, pain, breast pain, lumps, nipple dc, dysuria. The patient is not sexually active,   had MI. GYN screening history: last pap: was normal 14 Neg. The patient is not taking hormone replacement therapy. Patient denies post-menopausal vaginal bleeding.. The patient participates in regular exercise: yes.  With rectal protrusion x few mo not painful, no leakage, no itching, hx of hemorrhoid  History of abnormal Pap smear: yes - approx 6-7 years ago, with [facility] GYN normal  Family history of uterine or ovarian cancer: yes - mother  Family history of colon caner:  no  History of abnormal mammogram: yes - hx of ductal carcinoma approx 5 years ago sees oncology Dr Arsh Esteves  Family history of breast cancer: no  Colonoscopy history:   10/2014 [facility]  DEXA scan:  05/15/15  She declines STD testing.    C/o B ear fullness and hearing loss L ear beginning this AM, no ear dc, no sore throat, no sinus tenderness or congestion, no SOA wheezing, coughing or fevers    The following portions of the patient's history were reviewed and updated as appropriate: allergies, current medications, past family history, past medical history, past social history, past surgical history and problem list.    Review of Systems   Constitutional: Negative for fever.   HENT: Positive for ear pain and hearing loss. Negative for congestion, dental problem, drooling, ear discharge, facial swelling, mouth sores, nosebleeds, postnasal drip, rhinorrhea, sinus pain, sinus pressure, sneezing, sore throat, tinnitus, trouble swallowing and voice change.    Respiratory: Negative for cough, shortness of breath and wheezing.    Cardiovascular: Negative for chest pain, palpitations and leg swelling.    Gastrointestinal: Negative for abdominal distention, abdominal pain, anal bleeding, blood in stool, constipation, diarrhea, nausea, rectal pain (but rectal nodule/prolapse) and vomiting.   Genitourinary: Negative for decreased urine volume, difficulty urinating, dyspareunia, dysuria, enuresis, flank pain, frequency, genital sores, hematuria, menstrual problem, pelvic pain, urgency, vaginal bleeding, vaginal discharge and vaginal pain.   Neurological: Negative for dizziness and headaches.   All other systems reviewed and are negative.      Objective   Physical Exam   Constitutional: She is oriented to person, place, and time. She appears well-developed and well-nourished.   HENT:   Head: Normocephalic and atraumatic.   Right Ear: A foreign body (cerumen impaction) is present. Decreased hearing is noted.   Left Ear: A foreign body (cerumen impaction) is present. Decreased hearing is noted.   Eyes: Conjunctivae and EOM are normal. Pupils are equal, round, and reactive to light.   Cardiovascular: Normal rate, regular rhythm and normal heart sounds.    Pulmonary/Chest: Effort normal and breath sounds normal.   Genitourinary: Vagina normal and uterus normal. Rectal exam shows external hemorrhoid. Cervix exhibits no discharge. Right adnexum displays no tenderness. Left adnexum displays no tenderness.   Musculoskeletal: Normal range of motion.   Neurological: She is alert and oriented to person, place, and time.   Skin: Skin is warm and dry.   Psychiatric: She has a normal mood and affect. Her behavior is normal. Judgment and thought content normal.   Vitals reviewed.  B ears irrigated successfully without complications, hearing intact, TM visualized    Assessment/Plan   Lili was seen today for abnormal pap smear and ear fullness.    Diagnoses and all orders for this visit:    Women's annual routine gynecological examination  -     Cancel: Pap IG (Image Guided) - ThinPrep Vial, Cervix  -     Cancel: Pap IG (Image Guided)  - ThinPrep Vial, Cervix  -     Pap IG (Image Guided) - ThinPrep Vial, Cervix    Bilateral impacted cerumen    Pap and pelvic today, cont FU with [place] oncology, B ears irrigated successfully without complications, hearing intact, TM visualized

## 2017-10-03 NOTE — PATIENT INSTRUCTIONS
Pap and pelvic today, cont FU with [place] [facility], B ears irrigated successfully without complications, hearing intact, TM visualized

## 2017-10-05 LAB
CHROM ANALY OVERALL INTERP-IMP: NORMAL
CONV .: NORMAL
CONV PERFORMED BY:: NORMAL
DX ICD CODE: NORMAL
HIV 1 & 2 AB SER-IMP: NORMAL
REF LAB TEST METHOD: NORMAL
STAT OF ADQ CVX/VAG CYTO-IMP: NORMAL

## 2017-10-10 ENCOUNTER — TELEPHONE (OUTPATIENT)
Dept: FAMILY MEDICINE CLINIC | Facility: CLINIC | Age: 72
End: 2017-10-10

## 2017-10-10 NOTE — TELEPHONE ENCOUNTER
[facility]  ---- Message from SHANON Aleman sent at 10/10/2017  8:39 AM EDT -----  Pap negative, normal

## 2017-10-10 NOTE — TELEPHONE ENCOUNTER
Pt informed    ----- Message from SHANON Aleman sent at 10/10/2017  8:39 AM EDT -----  Pap negative, normal

## 2017-10-20 RX ORDER — HYDROCHLOROTHIAZIDE 12.5 MG/1
CAPSULE, GELATIN COATED ORAL
Qty: 30 CAPSULE | Refills: 0 | Status: SHIPPED | OUTPATIENT
Start: 2017-10-20 | End: 2018-01-25

## 2017-11-11 ENCOUNTER — HOSPITAL ENCOUNTER (OUTPATIENT)
Dept: MAMMOGRAPHY | Facility: HOSPITAL | Age: 72
Discharge: HOME | End: 2017-11-11
Admitting: INTERNAL MEDICINE

## 2017-11-11 DIAGNOSIS — Z12.31 VISIT FOR SCREENING MAMMOGRAM: ICD-10-CM

## 2017-11-11 PROCEDURE — G0202 SCR MAMMO BI INCL CAD: HCPCS

## 2017-11-11 PROCEDURE — 77063 BREAST TOMOSYNTHESIS BI: CPT

## 2018-01-25 ENCOUNTER — OFFICE VISIT (OUTPATIENT)
Dept: FAMILY MEDICINE CLINIC | Facility: CLINIC | Age: 73
End: 2018-01-25

## 2018-01-25 VITALS
HEIGHT: 63 IN | WEIGHT: 162 LBS | HEART RATE: 60 BPM | BODY MASS INDEX: 28.7 KG/M2 | DIASTOLIC BLOOD PRESSURE: 60 MMHG | TEMPERATURE: 97.8 F | RESPIRATION RATE: 12 BRPM | OXYGEN SATURATION: 100 % | SYSTOLIC BLOOD PRESSURE: 92 MMHG

## 2018-01-25 DIAGNOSIS — E55.9 VITAMIN D DEFICIENCY: ICD-10-CM

## 2018-01-25 DIAGNOSIS — Z79.891 LONG TERM PRESCRIPTION OPIATE USE: ICD-10-CM

## 2018-01-25 DIAGNOSIS — E78.2 MIXED HYPERLIPIDEMIA: ICD-10-CM

## 2018-01-25 DIAGNOSIS — M54.40 CHRONIC LOW BACK PAIN WITH SCIATICA, SCIATICA LATERALITY UNSPECIFIED, UNSPECIFIED BACK PAIN LATERALITY: ICD-10-CM

## 2018-01-25 DIAGNOSIS — I10 ESSENTIAL HYPERTENSION: Primary | ICD-10-CM

## 2018-01-25 DIAGNOSIS — G89.29 CHRONIC LOW BACK PAIN WITH SCIATICA, SCIATICA LATERALITY UNSPECIFIED, UNSPECIFIED BACK PAIN LATERALITY: ICD-10-CM

## 2018-01-25 DIAGNOSIS — I48.0 PAROXYSMAL ATRIAL FIBRILLATION (HCC): ICD-10-CM

## 2018-01-25 PROCEDURE — 99214 OFFICE O/P EST MOD 30 MIN: CPT | Performed by: INTERNAL MEDICINE

## 2018-01-25 RX ORDER — TRAMADOL HYDROCHLORIDE 50 MG/1
50 TABLET ORAL EVERY 8 HOURS PRN
Qty: 30 TABLET | Refills: 2 | Status: SHIPPED | OUTPATIENT
Start: 2018-01-25 | End: 2018-10-30 | Stop reason: SDUPTHER

## 2018-01-25 RX ORDER — FLECAINIDE ACETATE 100 MG/1
100 TABLET ORAL EVERY 12 HOURS SCHEDULED
Qty: 60 TABLET | Refills: 3 | Status: SHIPPED | OUTPATIENT
Start: 2018-01-25

## 2018-01-26 NOTE — PROGRESS NOTES
Subjective   Lili Ruiz is a 72 y.o. female.     History of Present Illness   Patient is being seen for hypertension.  Her blood pressures been running 100 over 80s at home.  Patient has been on diet and lost almost 60 pounds and is exercising on a regular basis.  She was taken off her blood pressure medication and cholesterol medication.  She will have labs drawn in 6 weeks and will follow-up a week later.  She will continue to monitor her blood pressure at home and her clinic with numbers.  She has taken tramadol for her low back pain.The patient has read and signed the [facility] Controlled Substance Contract.  I will continue to see patient for regular follow up appointments.  They are well controlled on their medication.  VEDA has been reviewed by me and is updated every 3 months. The patient is aware of the potential for addiction and dependence.      Much of this encounter note is an electronic transcription/translation of spoken language to printed text.  The electronic translation of spoken language may permit erroneous, or at times, nonsensical words or phrases to be inadvertently transcribed.  Although I have reviewed the note for such errors, some may still exist.  The following portions of the patient's history were reviewed and updated as appropriate: allergies, current medications, past family history, past medical history, past social history, past surgical history and problem list.    Review of Systems   Constitutional: Negative for fatigue and fever.   HENT: Positive for congestion. Negative for trouble swallowing.    Eyes: Negative for discharge and visual disturbance.   Respiratory: Negative for choking and shortness of breath.    Cardiovascular: Negative for chest pain and palpitations.   Gastrointestinal: Negative for abdominal pain and blood in stool.   Endocrine: Negative.    Genitourinary: Negative for genital sores and hematuria.   Musculoskeletal: Positive for back pain.  Negative for gait problem and joint swelling.   Skin: Negative for color change, pallor, rash and wound.   Allergic/Immunologic: Positive for environmental allergies. Negative for immunocompromised state.   Neurological: Negative for facial asymmetry and speech difficulty.   Psychiatric/Behavioral: Negative for hallucinations and suicidal ideas.       Objective   Physical Exam   Constitutional: She is oriented to person, place, and time. She appears well-developed and well-nourished.   HENT:   Head: Normocephalic.   Eyes: Conjunctivae are normal. Pupils are equal, round, and reactive to light.   Neck: Normal range of motion. Neck supple.   Cardiovascular: Normal rate, regular rhythm and normal heart sounds.    Pulmonary/Chest: Effort normal and breath sounds normal.   Abdominal: Soft. Bowel sounds are normal.   Musculoskeletal: She exhibits edema, tenderness and deformity.   Neurological: She is alert and oriented to person, place, and time.   Skin: Skin is warm and dry.   Psychiatric: She has a normal mood and affect. Her behavior is normal. Judgment and thought content normal.   Nursing note and vitals reviewed.      Assessment/Plan   Problems Addressed this Visit        Cardiovascular and Mediastinum    Hypertension - Primary    Relevant Orders    Comprehensive Metabolic Panel    CBC & Differential    Lipid Panel    Vitamin D 25 Hydroxy    Hyperlipidemia    Relevant Orders    Comprehensive Metabolic Panel    CBC & Differential    Lipid Panel    Vitamin D 25 Hydroxy    Atrial fibrillation    Relevant Orders    Comprehensive Metabolic Panel    CBC & Differential    Lipid Panel    Vitamin D 25 Hydroxy       Nervous and Auditory    Low back pain      Other Visit Diagnoses     Vitamin D deficiency         Relevant Orders    Vitamin D 25 Hydroxy    Long term prescription opiate use        Relevant Orders    Compliance Drug Analysis, Ur - Urine, Clean Catch

## 2018-09-13 ENCOUNTER — OFFICE VISIT (OUTPATIENT)
Dept: FAMILY MEDICINE CLINIC | Facility: CLINIC | Age: 73
End: 2018-09-13

## 2018-09-13 VITALS
WEIGHT: 184 LBS | BODY MASS INDEX: 32.6 KG/M2 | OXYGEN SATURATION: 96 % | TEMPERATURE: 98.6 F | DIASTOLIC BLOOD PRESSURE: 98 MMHG | SYSTOLIC BLOOD PRESSURE: 148 MMHG | HEART RATE: 73 BPM | HEIGHT: 63 IN

## 2018-09-13 DIAGNOSIS — M25.9 HIP PROBLEM: ICD-10-CM

## 2018-09-13 DIAGNOSIS — I10 HYPERTENSION, ESSENTIAL: ICD-10-CM

## 2018-09-13 DIAGNOSIS — M79.651 PAIN OF RIGHT THIGH: Primary | ICD-10-CM

## 2018-09-13 PROCEDURE — 99214 OFFICE O/P EST MOD 30 MIN: CPT | Performed by: INTERNAL MEDICINE

## 2018-09-13 PROCEDURE — 73552 X-RAY EXAM OF FEMUR 2/>: CPT | Performed by: INTERNAL MEDICINE

## 2018-09-13 PROCEDURE — 73502 X-RAY EXAM HIP UNI 2-3 VIEWS: CPT | Performed by: INTERNAL MEDICINE

## 2018-09-13 RX ORDER — AMLODIPINE BESYLATE 5 MG/1
5 TABLET ORAL DAILY
Qty: 30 TABLET | Refills: 1 | Status: SHIPPED | OUTPATIENT
Start: 2018-09-13 | End: 2018-10-30

## 2018-09-13 RX ORDER — PRAVASTATIN SODIUM 20 MG
20 TABLET ORAL DAILY
Refills: 0 | COMMUNITY
Start: 2018-07-27 | End: 2020-03-12 | Stop reason: SDUPTHER

## 2018-09-13 NOTE — PROGRESS NOTES
Subjective   Lili Ruiz is a 73 y.o. female.   Increasing pain right thigh history of lumps on right thumb the past and having pain particularly when goes from sitting to standing he'll use Z 76 get up seems pains both anterior and lateral right thigh.  Minimal discomfort and hip no significant pain noted lower back with this.  Denies any locking or giving right hip although had does have a mild problem with left hip doing that.  No pain really all way down leg.  Blood pressure doesn't be up today also  History of Present Illness   Hx hbp  Lost wt,  Able to go off bp meds .  w r leg pain some recent wt gain,  bp elev today, did repeat blood pressure still elevated we'll restart patient on a blood pressure medicine pain is more the right thigh area not so much in the hip.  Subcutaneous mass is followed over the years without noticed change by patient's observation.  Regarding social history he is allergic to sotalol and hydrocodone.  His family history of breast heart disease and ovarian cancer in the mother.  Patient does have known history of proximal SVT as well as a.  A. fib.  Is on anticoagulation for this.  Review of Systems   All other systems reviewed and are negative.      Objective   Vitals:    09/13/18 1600   BP: (!) 150/106   Pulse: 73   Temp: 98.6 °F (37 °C)   SpO2: 96%   Weight: 83.5 kg (184 lb)     Physical Exam   HENT:   Head: Normocephalic and atraumatic.   Eyes: Pupils are equal, round, and reactive to light. Conjunctivae are normal.   Cardiovascular: Normal rate and regular rhythm.    Pulmonary/Chest: Effort normal and breath sounds normal.   Abdominal: Soft. Bowel sounds are normal.   Musculoskeletal:   Right hip is nontender palpation mild restriction range of motion with internal and external rotation right hip and no pain with that.  Does have mild discomfort palpation lateral hip vague subcutaneous mass possibly lipoma proximal right thigh may be 3 cm size slightly distal is a vague area  no discrete edges to put area of increased discomfort.  Nontender palpation of the anterior thigh.  Nontender with external rotation of the lower leg.   Neurological: She is alert.   Wants to stop able to ambulate well.  Patient with negative straight-leg raises bilaterally   Skin: Skin is warm.   Nursing note and vitals reviewed.      No results found for: INR    Procedures  Right hip x-ray obtained 2 views.  No comparison available.  Mild degenerative changes noted probably the superior acetabular rim area otherwise unremarkable.    X-ray right femur 2 views obtained.  No bony or soft tissue abnormalities are noted.  Assessment/Plan   1.  Right thigh pain undefined plan refer to Dr. Luis Hendricks for evaluation    2.  Mildly abnormal x-ray right hip degenerative changes noted in superior acetabular rim plan see #1    3.  Hypertension suboptimally controlled plan Norvasc 5 mg daily with follow-up in 6 weeks if problems in the interim.      Much of this encounter note is an electronic transcription/translation of spoken language to printed text.  The electronic translation of spoken language may permit erroneous, or at times, nonsensical words or phrases to be inadvertently transcribed.  Although I have reviewed the note for such errors, some may still exist. If there are questions or for further clarification, please contact me.

## 2018-10-24 ENCOUNTER — TRANSCRIBE ORDERS (OUTPATIENT)
Dept: ADMINISTRATIVE | Facility: HOSPITAL | Age: 73
End: 2018-10-24

## 2018-10-24 DIAGNOSIS — Z12.31 VISIT FOR SCREENING MAMMOGRAM: Primary | ICD-10-CM

## 2018-10-30 ENCOUNTER — OFFICE VISIT (OUTPATIENT)
Dept: FAMILY MEDICINE CLINIC | Facility: CLINIC | Age: 73
End: 2018-10-30

## 2018-10-30 VITALS
HEART RATE: 76 BPM | TEMPERATURE: 97.7 F | SYSTOLIC BLOOD PRESSURE: 102 MMHG | HEIGHT: 63 IN | OXYGEN SATURATION: 97 % | RESPIRATION RATE: 16 BRPM | DIASTOLIC BLOOD PRESSURE: 70 MMHG | WEIGHT: 186.2 LBS | BODY MASS INDEX: 32.99 KG/M2

## 2018-10-30 DIAGNOSIS — Z12.39 BREAST CANCER SCREENING: ICD-10-CM

## 2018-10-30 DIAGNOSIS — I10 ESSENTIAL HYPERTENSION: Primary | ICD-10-CM

## 2018-10-30 DIAGNOSIS — M81.0 AGE-RELATED OSTEOPOROSIS WITHOUT CURRENT PATHOLOGICAL FRACTURE: ICD-10-CM

## 2018-10-30 PROCEDURE — 99213 OFFICE O/P EST LOW 20 MIN: CPT | Performed by: INTERNAL MEDICINE

## 2018-10-30 RX ORDER — TRAMADOL HYDROCHLORIDE 50 MG/1
50 TABLET ORAL EVERY 8 HOURS PRN
Qty: 30 TABLET | Refills: 2 | Status: SHIPPED | OUTPATIENT
Start: 2018-10-30 | End: 2019-04-24 | Stop reason: SDUPTHER

## 2018-10-30 RX ORDER — OMEPRAZOLE 20 MG/1
20 CAPSULE, DELAYED RELEASE ORAL DAILY
COMMUNITY
End: 2023-02-24 | Stop reason: SDUPTHER

## 2018-10-30 RX ORDER — TRAMADOL HYDROCHLORIDE 50 MG/1
50 TABLET ORAL EVERY 8 HOURS PRN
Qty: 30 TABLET | Refills: 2 | Status: SHIPPED | OUTPATIENT
Start: 2018-10-30 | End: 2018-10-30 | Stop reason: SDUPTHER

## 2018-10-30 RX ORDER — LISINOPRIL 20 MG/1
20 TABLET ORAL DAILY
Qty: 30 TABLET | Refills: 3 | Status: SHIPPED | OUTPATIENT
Start: 2018-10-30 | End: 2019-02-14 | Stop reason: SDUPTHER

## 2018-10-30 NOTE — PROGRESS NOTES
Subjective   Lili Ruiz is a 73 y.o. female.     History of Present Illness   Patient was seen for hypertension.  Her blood pressure is running 102 over 70s.  Patient was set up for mammogram and DEXA scan.  Patient has been treated for breast cancer and needs a bone density scan.    Dictated utilizing Dragon dictation. If there are questions or for further clarification, please contact me.   The following portions of the patient's history were reviewed and updated as appropriate: allergies, current medications, past family history, past medical history, past social history, past surgical history and problem list.    Review of Systems   Constitutional: Negative for fatigue and fever.   HENT: Positive for congestion. Negative for trouble swallowing.    Eyes: Negative for discharge and visual disturbance.   Respiratory: Negative for choking and shortness of breath.    Cardiovascular: Negative for chest pain and palpitations.   Gastrointestinal: Negative for abdominal pain and blood in stool.   Endocrine: Negative.    Genitourinary: Negative for genital sores and hematuria.   Musculoskeletal: Negative for gait problem and joint swelling.   Skin: Negative for color change, pallor, rash and wound.   Allergic/Immunologic: Positive for environmental allergies. Negative for immunocompromised state.   Neurological: Negative for facial asymmetry and speech difficulty.   Psychiatric/Behavioral: Negative for hallucinations and suicidal ideas.       Objective   Physical Exam   Constitutional: She is oriented to person, place, and time. She appears well-developed and well-nourished.   HENT:   Head: Normocephalic.   Eyes: Pupils are equal, round, and reactive to light. Conjunctivae are normal.   Neck: Normal range of motion. Neck supple.   Cardiovascular: Normal rate, regular rhythm and normal heart sounds.    Pulmonary/Chest: Effort normal and breath sounds normal.   Abdominal: Soft. Bowel sounds are normal.    Musculoskeletal: Normal range of motion.   Neurological: She is alert and oriented to person, place, and time.   Skin: Skin is warm and dry.   Psychiatric: She has a normal mood and affect. Her behavior is normal. Judgment and thought content normal.   Nursing note and vitals reviewed.      Assessment/Plan   Problems Addressed this Visit        Cardiovascular and Mediastinum    Hypertension - Primary    Relevant Medications    lisinopril (PRINIVIL,ZESTRIL) 20 MG tablet      Other Visit Diagnoses     Breast cancer screening        Relevant Orders    Mammo Screening Bilateral With CAD    DEXA Bone Density Axial    Age-related osteoporosis without current pathological fracture         Relevant Orders    DEXA Bone Density Axial

## 2018-12-08 ENCOUNTER — APPOINTMENT (OUTPATIENT)
Dept: MAMMOGRAPHY | Facility: HOSPITAL | Age: 73
End: 2018-12-08

## 2018-12-11 ENCOUNTER — OFFICE VISIT (OUTPATIENT)
Dept: FAMILY MEDICINE CLINIC | Facility: CLINIC | Age: 73
End: 2018-12-11

## 2018-12-11 VITALS
HEIGHT: 63 IN | RESPIRATION RATE: 16 BRPM | OXYGEN SATURATION: 100 % | BODY MASS INDEX: 34.38 KG/M2 | SYSTOLIC BLOOD PRESSURE: 130 MMHG | HEART RATE: 67 BPM | TEMPERATURE: 98.2 F | DIASTOLIC BLOOD PRESSURE: 94 MMHG | WEIGHT: 194 LBS

## 2018-12-11 DIAGNOSIS — E55.9 VITAMIN D DEFICIENCY: ICD-10-CM

## 2018-12-11 DIAGNOSIS — Z00.00 MEDICARE ANNUAL WELLNESS VISIT, SUBSEQUENT: Primary | ICD-10-CM

## 2018-12-11 DIAGNOSIS — E78.2 MIXED HYPERLIPIDEMIA: ICD-10-CM

## 2018-12-11 DIAGNOSIS — I10 ESSENTIAL HYPERTENSION: ICD-10-CM

## 2018-12-11 DIAGNOSIS — K57.10 DIVERTICULOSIS OF SMALL INTESTINE WITHOUT HEMORRHAGE: ICD-10-CM

## 2018-12-11 PROCEDURE — 99214 OFFICE O/P EST MOD 30 MIN: CPT | Performed by: INTERNAL MEDICINE

## 2018-12-11 PROCEDURE — G0439 PPPS, SUBSEQ VISIT: HCPCS | Performed by: INTERNAL MEDICINE

## 2018-12-11 PROCEDURE — 90662 IIV NO PRSV INCREASED AG IM: CPT | Performed by: INTERNAL MEDICINE

## 2018-12-11 PROCEDURE — G0008 ADMIN INFLUENZA VIRUS VAC: HCPCS | Performed by: INTERNAL MEDICINE

## 2018-12-11 NOTE — PROGRESS NOTES
QUICK REFERENCE INFORMATION:  The ABCs of the Annual Wellness Visit    Subsequent Medicare Wellness Visit    HEALTH RISK ASSESSMENT    1945    Recent Hospitalizations:  No hospitalization(s) within the last year..        Current Medical Providers:  Patient Care Team:  Luis Davis MD as PCP - General  Luis Davis MD as PCP - Family Medicine  Luis Davis MD as PCP - Claims Attributed  Shamar Caban MD as Consulting Physician (Medical Oncology)  Lee Heredia MD as Surgeon (General Surgery)  Khris Lewis II, MD as Consulting Physician (Interventional Cardiology)        Smoking Status:  Social History     Tobacco Use   Smoking Status Former Smoker   Tobacco Comment    50 yrs ago       Alcohol Consumption:  Social History     Substance and Sexual Activity   Alcohol Use No       Depression Screen:   PHQ-2/PHQ-9 Depression Screening 12/11/2018   Little interest or pleasure in doing things 0   Feeling down, depressed, or hopeless 0   Trouble falling or staying asleep, or sleeping too much 0   Feeling tired or having little energy 0   Poor appetite or overeating 0   Feeling bad about yourself - or that you are a failure or have let yourself or your family down 0   Trouble concentrating on things, such as reading the newspaper or watching television 0   Moving or speaking so slowly that other people could have noticed. Or the opposite - being so fidgety or restless that you have been moving around a lot more than usual 0   Thoughts that you would be better off dead, or of hurting yourself in some way 0   Total Score 0   If you checked off any problems, how difficult have these problems made it for you to do your work, take care of things at home, or get along with other people? Not difficult at all       Health Habits and Functional and Cognitive Screening:  Functional & Cognitive Status 6/6/2017   Do you have difficulty preparing food and eating? No   Do you have difficulty bathing yourself,  getting dressed or grooming yourself? No   Do you have difficulty using the toilet? No   Do you have difficulty moving around from place to place? No   In the past year have you fallen or experienced a near fall? No   Current Diet Well Balanced Diet   Dental Exam Not up to date   Eye Exam Up to date   Exercise (times per week) 5 times per week   Current Exercise Activities Include Walking   Do you need help using the phone?  No   Are you deaf or do you have serious difficulty hearing?  No   Do you need help with transportation? No   Do you need help shopping? No   Do you need help preparing meals?  No   Do you need help with housework?  No   Do you need help with laundry? No   Do you need help taking your medications? No   Do you need help managing money? No   Do you have difficulty concentrating, remembering or making decisions? No           Does the patient have evidence of cognitive impairment? No    Aspirin use counseling: Does not need ASA (and currently is not on it)      Recent Lab Results:  CMP:  Lab Results   Component Value Date    BUN 12 09/26/2017    CREATININE 0.71 09/26/2017    EGFRIFNONA 81 09/26/2017    BCR 16.9 09/26/2017     09/26/2017    K 4.5 09/26/2017    CO2 26.5 09/26/2017    CALCIUM 10.5 09/26/2017    ALBUMIN 4.10 09/26/2017    BILITOT 0.5 09/26/2017    ALKPHOS 49 09/26/2017    AST 11 09/26/2017    ALT 14 09/26/2017     Lipid Panel:  Lab Results   Component Value Date    CHOL 179 09/26/2017    TRIG 64 09/26/2017    HDL 62 (H) 09/26/2017    VLDL 12.8 09/26/2017    LDLHDL 1.68 09/26/2017     HbA1c:       Visual Acuity:  No exam data present    Age-appropriate Screening Schedule:  Refer to the list below for future screening recommendations based on patient's age, sex and/or medical conditions. Orders for these recommended tests are listed in the plan section. The patient has been provided with a written plan.    Health Maintenance   Topic Date Due   • ZOSTER VACCINE (2 of 2) 03/14/2017    • LIPID PANEL  09/26/2018   • DXA SCAN  10/30/2018   • MAMMOGRAM  11/15/2019   • COLONOSCOPY  01/17/2027   • TDAP/TD VACCINES (2 - Td) 01/17/2027   • INFLUENZA VACCINE  Completed   • PNEUMOCOCCAL VACCINES (65+ LOW/MEDIUM RISK)  Discontinued        Subjective   History of Present Illness    Lili LETHA Ruiz is a 73 y.o. female who presents for an Subsequent Wellness Visit.    The following portions of the patient's history were reviewed and updated as appropriate: allergies, current medications, past family history, past medical history, past social history, past surgical history and problem list.    Outpatient Medications Prior to Visit   Medication Sig Dispense Refill   • apixaban (ELIQUIS) 5 MG tablet tablet Take 5 mg by mouth 2 (Two) Times a Day.     • Cholecalciferol (VITAMIN D3) 2000 UNITS tablet Take 1 tablet by mouth Daily.     • flecainide (TAMBOCOR) 100 MG tablet Take 1 tablet by mouth Every 12 (Twelve) Hours. 60 tablet 3   • lisinopril (PRINIVIL,ZESTRIL) 20 MG tablet Take 1 tablet by mouth Daily. 30 tablet 3   • Loratadine (CLARITIN) 10 MG capsule Take 10 tablets by mouth daily as needed.     • Omega-3 Fatty Acids (FISH OIL) 1200 MG capsule delayed-release Take  by mouth.     • omeprazole (priLOSEC) 20 MG capsule Take 20 mg by mouth 2 (Two) Times a Day As Needed.     • pravastatin (PRAVACHOL) 20 MG tablet Take 20 mg by mouth Daily.  0   • traMADol (ULTRAM) 50 MG tablet Take 1 tablet by mouth Every 8 (Eight) Hours As Needed for Moderate Pain . 30 tablet 2     No facility-administered medications prior to visit.        Patient Active Problem List   Diagnosis   • Visual impairment   • Peptic ulceration   • Low back pain   • Kidney stone   • Hypertension   • Hyperlipidemia   • Diverticulosis   • Depression   • Cancer (CMS/HCC)   • Atrial fibrillation (CMS/HCC)   • Arthritis   • Anxiety   • Human metapneumovirus (hMPV) pneumonia   • Paroxysmal supraventricular tachycardia (CMS/HCC)   • Esophageal reflux   •  "Ductal carcinoma in situ (DCIS) of right breast   • Carotid artery stenosis   • Family history of malignant neoplasm of gastrointestinal tract   • Family history of malignant neoplasm of genital organ, other       Advance Care Planning:  has an advance directive - a copy HAS NOT been provided. Have asked the patient to send this to us to add to record.    Identification of Risk Factors:  Risk factors include: NA.    Review of Systems    Compared to one year ago, the patient feels her physical health is the same.  Compared to one year ago, the patient feels her mental health is the same.    Objective     Physical Exam    Vitals:    12/11/18 1610   BP: 130/94   BP Location: Left arm   Patient Position: Sitting   Cuff Size: Adult   Pulse: 67   Resp: 16   Temp: 98.2 °F (36.8 °C)   TempSrc: Oral   SpO2: 100%   Weight: 88 kg (194 lb)   Height: 160 cm (62.99\")   PainSc: 0-No pain       Patient's Body mass index is 34.37 kg/m². BMI is within normal parameters. No follow-up required.      Assessment/Plan   Patient Self-Management and Personalized Health Advice  The patient has been provided with information about: NA and preventive services including:   · NA.    Visit Diagnoses:    ICD-10-CM ICD-9-CM   1. Mixed hyperlipidemia E78.2 272.2   2. Essential hypertension I10 401.9   3. Diverticulosis of small intestine without hemorrhage K57.10 562.00       No orders of the defined types were placed in this encounter.      Outpatient Encounter Medications as of 12/11/2018   Medication Sig Dispense Refill   • apixaban (ELIQUIS) 5 MG tablet tablet Take 5 mg by mouth 2 (Two) Times a Day.     • Cholecalciferol (VITAMIN D3) 2000 UNITS tablet Take 1 tablet by mouth Daily.     • flecainide (TAMBOCOR) 100 MG tablet Take 1 tablet by mouth Every 12 (Twelve) Hours. 60 tablet 3   • lisinopril (PRINIVIL,ZESTRIL) 20 MG tablet Take 1 tablet by mouth Daily. 30 tablet 3   • Loratadine (CLARITIN) 10 MG capsule Take 10 tablets by mouth daily as " needed.     • Omega-3 Fatty Acids (FISH OIL) 1200 MG capsule delayed-release Take  by mouth.     • omeprazole (priLOSEC) 20 MG capsule Take 20 mg by mouth 2 (Two) Times a Day As Needed.     • pravastatin (PRAVACHOL) 20 MG tablet Take 20 mg by mouth Daily.  0   • traMADol (ULTRAM) 50 MG tablet Take 1 tablet by mouth Every 8 (Eight) Hours As Needed for Moderate Pain . 30 tablet 2     No facility-administered encounter medications on file as of 12/11/2018.        Reviewed use of high risk medication in the elderly: not applicable  Reviewed for potential of harmful drug interactions in the elderly: not applicable    Follow Up:  No Follow-up on file.     An After Visit Summary and PPPS with all of these plans were given to the patient.

## 2018-12-11 NOTE — PATIENT INSTRUCTIONS
Medicare Wellness  Personal Prevention Plan of Service     Date of Office Visit:  2018  Encounter Provider:  Luis Davis MD  Place of Service:  [facility]  Patient Name: Lili Ruiz  :  1945    As part of the Medicare Wellness portion of your visit today, we are providing you with this personalized preventive plan of services (PPPS). This plan is based upon recommendations of the [place] Preventive Services Task Force (USPSTF) and the Advisory Committee on Immunization Practices (ACIP).    This lists the preventive care services that should be considered, and provides dates of when you are due. Items listed as completed are up-to-date and do not require any further intervention.    Health Maintenance   Topic Date Due   • HEPATITIS A VACCINE ADULT (1 of 2) 1965   • ZOSTER VACCINE (2 of 2) 2017   • MEDICARE ANNUAL WELLNESS  2018   • LIPID PANEL  2018   • DXA SCAN  10/30/2018   • MAMMOGRAM  11/15/2019   • COLONOSCOPY  2027   • TDAP/TD VACCINES (2 - Td) 2027   • INFLUENZA VACCINE  Completed   • HEPATITIS C SCREENING  Addressed   • PNEUMOCOCCAL VACCINES (65+ LOW/MEDIUM RISK)  Discontinued       No orders of the defined types were placed in this encounter.      No Follow-up on file.

## 2018-12-11 NOTE — PROGRESS NOTES
Subjective   Lili Ruiz is a 73 y.o. female.     History of Present Illness     {Common H&P Review Areas:28869}    Review of Systems    Objective   Physical Exam    Assessment/Plan   {Assess/PlanSmartLinks:96778}

## 2018-12-12 NOTE — PROGRESS NOTES
Subjective   Lili Ruiz is a 73 y.o. female.     History of Present Illness   She was seen for Medicare wellness visit.  Her pressures been running 120s over 80s.  Her lipids a been controlled with diet and exercise and her statin.  Her diverticulosis been stable past 6 months without problems.    Dictated utilizing Dragon dictation. If there are questions or for further clarification, please contact me.   The following portions of the patient's history were reviewed and updated as appropriate: allergies, current medications, past family history, past medical history, past social history, past surgical history and problem list.    Review of Systems   Constitutional: Negative for fatigue and fever.   HENT: Positive for congestion. Negative for trouble swallowing.    Eyes: Negative for discharge and visual disturbance.   Respiratory: Negative for choking and shortness of breath.    Cardiovascular: Negative for chest pain and palpitations.   Gastrointestinal: Negative for abdominal pain and blood in stool.   Endocrine: Negative.    Genitourinary: Negative for genital sores and hematuria.   Musculoskeletal: Negative for gait problem and joint swelling.   Skin: Negative for color change, pallor, rash and wound.   Allergic/Immunologic: Positive for environmental allergies. Negative for immunocompromised state.   Neurological: Negative for facial asymmetry and speech difficulty.   Psychiatric/Behavioral: Negative for hallucinations and suicidal ideas.       Objective   Physical Exam   Constitutional: She is oriented to person, place, and time. She appears well-developed and well-nourished.   HENT:   Head: Normocephalic.   Eyes: Conjunctivae are normal. Pupils are equal, round, and reactive to light.   Neck: Normal range of motion. Neck supple.   Cardiovascular: Normal rate, regular rhythm and normal heart sounds.   Pulmonary/Chest: Effort normal and breath sounds normal.   Abdominal: Soft. Bowel sounds are normal.    Musculoskeletal: Normal range of motion.   Neurological: She is alert and oriented to person, place, and time.   Skin: Skin is warm and dry.   Psychiatric: She has a normal mood and affect. Her behavior is normal. Judgment and thought content normal.   Nursing note and vitals reviewed.      Assessment/Plan   Problems Addressed this Visit        Cardiovascular and Mediastinum    Hypertension    Relevant Orders    CBC & Differential    Comprehensive Metabolic Panel    Lipid Panel    Vitamin D 25 Hydroxy    Hyperlipidemia    Relevant Orders    CBC & Differential    Comprehensive Metabolic Panel    Lipid Panel    Vitamin D 25 Hydroxy       Digestive    Diverticulosis    Relevant Orders    CBC & Differential    Comprehensive Metabolic Panel    Lipid Panel    Vitamin D 25 Hydroxy      Other Visit Diagnoses     Medicare annual wellness visit, subsequent    -  Primary    Relevant Orders    CBC & Differential    Comprehensive Metabolic Panel    Lipid Panel    Vitamin D 25 Hydroxy    Vitamin D deficiency         Relevant Orders    Vitamin D 25 Hydroxy

## 2019-01-26 ENCOUNTER — HOSPITAL ENCOUNTER (OUTPATIENT)
Dept: MAMMOGRAPHY | Facility: HOSPITAL | Age: 74
Discharge: HOME | End: 2019-01-26
Admitting: INTERNAL MEDICINE

## 2019-01-26 DIAGNOSIS — Z12.31 VISIT FOR SCREENING MAMMOGRAM: ICD-10-CM

## 2019-01-26 PROCEDURE — 77063 BREAST TOMOSYNTHESIS BI: CPT

## 2019-01-26 PROCEDURE — 77067 SCR MAMMO BI INCL CAD: CPT

## 2019-02-14 RX ORDER — LISINOPRIL 20 MG/1
20 TABLET ORAL DAILY
Qty: 30 TABLET | Refills: 3 | Status: SHIPPED | OUTPATIENT
Start: 2019-02-14 | End: 2019-06-10 | Stop reason: SDUPTHER

## 2019-04-24 ENCOUNTER — OFFICE VISIT (OUTPATIENT)
Dept: FAMILY MEDICINE CLINIC | Facility: CLINIC | Age: 74
End: 2019-04-24

## 2019-04-24 VITALS
HEIGHT: 63 IN | TEMPERATURE: 98.2 F | HEART RATE: 75 BPM | SYSTOLIC BLOOD PRESSURE: 122 MMHG | OXYGEN SATURATION: 97 % | BODY MASS INDEX: 33.98 KG/M2 | DIASTOLIC BLOOD PRESSURE: 80 MMHG | WEIGHT: 191.8 LBS

## 2019-04-24 DIAGNOSIS — E55.9 VITAMIN D DEFICIENCY: ICD-10-CM

## 2019-04-24 DIAGNOSIS — I48.0 PAROXYSMAL ATRIAL FIBRILLATION (HCC): ICD-10-CM

## 2019-04-24 DIAGNOSIS — Z00.00 MEDICARE ANNUAL WELLNESS VISIT, SUBSEQUENT: Primary | ICD-10-CM

## 2019-04-24 DIAGNOSIS — K57.10 DIVERTICULOSIS OF SMALL INTESTINE WITHOUT HEMORRHAGE: ICD-10-CM

## 2019-04-24 DIAGNOSIS — Z79.891 LONG TERM CURRENT USE OF OPIATE ANALGESIC: ICD-10-CM

## 2019-04-24 DIAGNOSIS — M25.561 ACUTE PAIN OF RIGHT KNEE: ICD-10-CM

## 2019-04-24 DIAGNOSIS — Z02.89 PAIN MEDICATION AGREEMENT SIGNED: ICD-10-CM

## 2019-04-24 DIAGNOSIS — I10 ESSENTIAL HYPERTENSION: ICD-10-CM

## 2019-04-24 DIAGNOSIS — E78.2 MIXED HYPERLIPIDEMIA: ICD-10-CM

## 2019-04-24 LAB
25(OH)D3 SERPL-MCNC: 34.9 NG/ML (ref 30–100)
ALBUMIN SERPL-MCNC: 3.9 G/DL (ref 3.5–5.2)
ALBUMIN/GLOB SERPL: 1.4 G/DL
ALP SERPL-CCNC: 59 U/L (ref 39–117)
ALT SERPL W P-5'-P-CCNC: 14 U/L (ref 1–33)
ANION GAP SERPL CALCULATED.3IONS-SCNC: 7.1 MMOL/L
AST SERPL-CCNC: 17 U/L (ref 1–32)
BILIRUB SERPL-MCNC: 0.3 MG/DL (ref 0.2–1.2)
BUN BLD-MCNC: 14 MG/DL (ref 8–23)
BUN/CREAT SERPL: 18.2 (ref 7–25)
CALCIUM SPEC-SCNC: 10.4 MG/DL (ref 8.6–10.5)
CHLORIDE SERPL-SCNC: 105 MMOL/L (ref 98–107)
CHOLEST SERPL-MCNC: 187 MG/DL (ref 0–200)
CO2 SERPL-SCNC: 27.9 MMOL/L (ref 22–29)
CREAT BLD-MCNC: 0.77 MG/DL (ref 0.57–1)
ERYTHROCYTE [DISTWIDTH] IN BLOOD BY AUTOMATED COUNT: 13.9 % (ref 12.3–15.4)
GFR SERPL CREATININE-BSD FRML MDRD: 74 ML/MIN/1.73
GLOBULIN UR ELPH-MCNC: 2.8 GM/DL
GLUCOSE BLD-MCNC: 84 MG/DL (ref 65–99)
HCT VFR BLD AUTO: 40.9 % (ref 34–46.6)
HDLC SERPL-MCNC: 76 MG/DL (ref 40–60)
HGB BLD-MCNC: 13.3 G/DL (ref 12–15.9)
LDLC SERPL CALC-MCNC: 97 MG/DL (ref 0–100)
LDLC/HDLC SERPL: 1.28 {RATIO}
LYMPHOCYTES # BLD AUTO: 2.3 10*3/MM3 (ref 0.7–3.1)
LYMPHOCYTES NFR BLD AUTO: 45.7 % (ref 19.6–45.3)
MCH RBC QN AUTO: 29.4 PG (ref 26.6–33)
MCHC RBC AUTO-ENTMCNC: 32.6 G/DL (ref 31.5–35.7)
MCV RBC AUTO: 90 FL (ref 79–97)
MONOCYTES # BLD AUTO: 0.3 10*3/MM3 (ref 0.1–0.9)
MONOCYTES NFR BLD AUTO: 6.5 % (ref 5–12)
NEUTROPHILS # BLD AUTO: 2.4 10*3/MM3 (ref 1.7–7)
NEUTROPHILS NFR BLD AUTO: 47.8 % (ref 42.7–76)
PLATELET # BLD AUTO: 212 10*3/MM3 (ref 140–450)
PMV BLD AUTO: 7.8 FL (ref 6–12)
POTASSIUM BLD-SCNC: 4.8 MMOL/L (ref 3.5–5.2)
PROT SERPL-MCNC: 6.7 G/DL (ref 6–8.5)
RBC # BLD AUTO: 4.54 10*6/MM3 (ref 3.77–5.28)
SODIUM BLD-SCNC: 140 MMOL/L (ref 136–145)
TRIGL SERPL-MCNC: 68 MG/DL (ref 0–150)
VLDLC SERPL-MCNC: 13.6 MG/DL (ref 5–40)
WBC NRBC COR # BLD: 5 10*3/MM3 (ref 3.4–10.8)

## 2019-04-24 PROCEDURE — 82306 VITAMIN D 25 HYDROXY: CPT | Performed by: INTERNAL MEDICINE

## 2019-04-24 PROCEDURE — 73560 X-RAY EXAM OF KNEE 1 OR 2: CPT | Performed by: INTERNAL MEDICINE

## 2019-04-24 PROCEDURE — 36415 COLL VENOUS BLD VENIPUNCTURE: CPT | Performed by: INTERNAL MEDICINE

## 2019-04-24 PROCEDURE — 80053 COMPREHEN METABOLIC PANEL: CPT | Performed by: INTERNAL MEDICINE

## 2019-04-24 PROCEDURE — 99214 OFFICE O/P EST MOD 30 MIN: CPT | Performed by: INTERNAL MEDICINE

## 2019-04-24 PROCEDURE — 80061 LIPID PANEL: CPT | Performed by: INTERNAL MEDICINE

## 2019-04-24 PROCEDURE — 85025 COMPLETE CBC W/AUTO DIFF WBC: CPT | Performed by: INTERNAL MEDICINE

## 2019-04-24 RX ORDER — TRAMADOL HYDROCHLORIDE 50 MG/1
50 TABLET ORAL EVERY 8 HOURS PRN
Qty: 30 TABLET | Refills: 2 | Status: SHIPPED | OUTPATIENT
Start: 2019-04-24 | End: 2019-10-23 | Stop reason: SDUPTHER

## 2019-04-24 NOTE — PATIENT INSTRUCTIONS
Medicare Wellness  Personal Prevention Plan of Service     Date of Office Visit:  2019  Encounter Provider:  Luis Davis MD  Place of Service:  [facility]  Patient Name: Lili Ruiz  :  1945    As part of the Medicare Wellness portion of your visit today, we are providing you with this personalized preventive plan of services (PPPS). This plan is based upon recommendations of the [place] Preventive Services Task Force (USPSTF) and the Advisory Committee on Immunization Practices (ACIP).    This lists the preventive care services that should be considered, and provides dates of when you are due. Items listed as completed are up-to-date and do not require any further intervention.    Health Maintenance   Topic Date Due   • ZOSTER VACCINE (2 of 2) 2017   • INFLUENZA VACCINE  2019   • MEDICARE ANNUAL WELLNESS  2019   • LIPID PANEL  2019   • DXA SCAN  2020   • MAMMOGRAM  2021   • COLONOSCOPY  2027   • TDAP/TD VACCINES (2 - Td) 2027   • HEPATITIS C SCREENING  Addressed   • PNEUMOCOCCAL VACCINES (65+ LOW/MEDIUM RISK)  Discontinued       Orders Placed This Encounter   Procedures   • XR Knee 1 or 2 View Right     Order Specific Question:   Reason for Exam:     Answer:   knee pain   • CBC Auto Differential   • Compliance Drug Analysis, Ur - Urine, Clean Catch       No Follow-up on file.

## 2019-04-24 NOTE — PROGRESS NOTES
Subjective   Lili Ruiz is a 74 y.o. female.     History of Present Illness   Patient was seen for Medicare wellness visit.  She developed acute pain in her right knee over the past 2 weeks.  Her x-rays show some degenerative changes but the injury consistent with a ligament strain.  Patient was instructed to use ice packs and Tylenol.  Blood pressures been controlled with medication is running 120s over 80s.  He does have paroxysmal atrial fibrillation is on Eliquis.  Have been controlled with diet and exercise.  Her diverticulosis been stable over the past 6 months.  Patient will follow up with her knee and 2 weeks    X-Ray  Interpretation report in house X-rays that I personally viewed    Relevant Clinical Issues/Diagnoses/Indications: Right knee pain right knee x-ray        Clinical Findings: #1 mild degenerative changes #2 glabella          Comparative Data: No previous x-ray          Date of Previous X-ray:    Change on current X-ray:        Dictated utilizing Dragon dictation. If there are questions or for further clarification, please contact me.  The following portions of the patient's history were reviewed and updated as appropriate: allergies, current medications, past family history, past medical history, past social history, past surgical history and problem list.    Review of Systems   Constitutional: Negative for fatigue and fever.   HENT: Positive for congestion. Negative for trouble swallowing.    Eyes: Negative for discharge and visual disturbance.   Respiratory: Negative for choking and shortness of breath.    Cardiovascular: Negative for chest pain and palpitations.   Gastrointestinal: Negative for abdominal pain and blood in stool.   Endocrine: Negative.    Genitourinary: Negative for genital sores and hematuria.   Musculoskeletal: Negative for gait problem and joint swelling.        Right knee pain   Skin: Negative for color change, pallor, rash and wound.   Allergic/Immunologic: Positive  for environmental allergies. Negative for immunocompromised state.   Neurological: Negative for facial asymmetry and speech difficulty.   Psychiatric/Behavioral: Negative for hallucinations and suicidal ideas.       Objective   Physical Exam   Constitutional: She is oriented to person, place, and time. She appears well-developed and well-nourished.   HENT:   Head: Normocephalic.   Eyes: Conjunctivae are normal. Pupils are equal, round, and reactive to light.   Neck: Normal range of motion. Neck supple.   Cardiovascular: Normal rate, regular rhythm and normal heart sounds.   Pulmonary/Chest: Effort normal and breath sounds normal.   Abdominal: Soft. Bowel sounds are normal.   Musculoskeletal: She exhibits edema and tenderness.   Neurological: She is alert and oriented to person, place, and time.   Skin: Skin is warm and dry.   Psychiatric: She has a normal mood and affect. Her behavior is normal. Judgment and thought content normal.   Nursing note and vitals reviewed.      Assessment/Plan   Problems Addressed this Visit        Cardiovascular and Mediastinum    Hypertension    Relevant Orders    CBC Auto Differential (Completed)    Hyperlipidemia    Relevant Orders    CBC Auto Differential (Completed)    Atrial fibrillation (CMS/HCC)       Digestive    Diverticulosis    Relevant Orders    CBC Auto Differential (Completed)      Other Visit Diagnoses     Medicare annual wellness visit, subsequent    -  Primary    Relevant Orders    CBC Auto Differential (Completed)    Acute pain of right knee        Relevant Orders    XR Knee 1 or 2 View Right (Completed)    Vitamin D deficiency         Pain medication agreement signed        Relevant Orders    Compliance Drug Analysis, Ur - Urine, Clean Catch    Long term current use of opiate analgesic         Relevant Orders    Compliance Drug Analysis, Ur - Urine, Clean Catch

## 2019-04-29 LAB — CONV REPORT SUMMARY: NORMAL

## 2019-06-10 RX ORDER — LISINOPRIL 20 MG/1
20 TABLET ORAL DAILY
Qty: 30 TABLET | Refills: 0 | Status: SHIPPED | OUTPATIENT
Start: 2019-06-10 | End: 2019-07-08 | Stop reason: SDUPTHER

## 2019-07-08 RX ORDER — LISINOPRIL 20 MG/1
20 TABLET ORAL DAILY
Qty: 30 TABLET | Refills: 2 | Status: SHIPPED | OUTPATIENT
Start: 2019-07-08 | End: 2019-10-23

## 2019-10-23 ENCOUNTER — OFFICE VISIT (OUTPATIENT)
Dept: FAMILY MEDICINE CLINIC | Facility: CLINIC | Age: 74
End: 2019-10-23

## 2019-10-23 VITALS
HEIGHT: 63 IN | WEIGHT: 190 LBS | TEMPERATURE: 98.2 F | DIASTOLIC BLOOD PRESSURE: 100 MMHG | OXYGEN SATURATION: 98 % | SYSTOLIC BLOOD PRESSURE: 150 MMHG | BODY MASS INDEX: 33.66 KG/M2 | HEART RATE: 61 BPM

## 2019-10-23 DIAGNOSIS — E78.2 MIXED HYPERLIPIDEMIA: ICD-10-CM

## 2019-10-23 DIAGNOSIS — R55 SYNCOPE, UNSPECIFIED SYNCOPE TYPE: Primary | ICD-10-CM

## 2019-10-23 DIAGNOSIS — I10 ESSENTIAL HYPERTENSION: ICD-10-CM

## 2019-10-23 DIAGNOSIS — I48.0 PAROXYSMAL ATRIAL FIBRILLATION (HCC): ICD-10-CM

## 2019-10-23 DIAGNOSIS — E55.9 VITAMIN D DEFICIENCY, UNSPECIFIED: ICD-10-CM

## 2019-10-23 LAB
25(OH)D3 SERPL-MCNC: 41.4 NG/ML (ref 30–100)
ALBUMIN SERPL-MCNC: 4.3 G/DL (ref 3.5–5.2)
ALBUMIN/GLOB SERPL: 1.9 G/DL
ALP SERPL-CCNC: 49 U/L (ref 39–117)
ALT SERPL W P-5'-P-CCNC: 15 U/L (ref 1–33)
ANION GAP SERPL CALCULATED.3IONS-SCNC: 8.6 MMOL/L (ref 5–15)
AST SERPL-CCNC: 14 U/L (ref 1–32)
BILIRUB SERPL-MCNC: 0.6 MG/DL (ref 0.2–1.2)
BUN BLD-MCNC: 11 MG/DL (ref 8–23)
BUN/CREAT SERPL: 14.7 (ref 7–25)
CALCIUM SPEC-SCNC: 9.9 MG/DL (ref 8.6–10.5)
CHLORIDE SERPL-SCNC: 107 MMOL/L (ref 98–107)
CHOLEST SERPL-MCNC: 174 MG/DL (ref 0–200)
CO2 SERPL-SCNC: 28.4 MMOL/L (ref 22–29)
CREAT BLD-MCNC: 0.75 MG/DL (ref 0.57–1)
DEPRECATED RDW RBC AUTO: 40.2 FL (ref 37–54)
ERYTHROCYTE [DISTWIDTH] IN BLOOD BY AUTOMATED COUNT: 11.7 % (ref 12.3–15.4)
GFR SERPL CREATININE-BSD FRML MDRD: 76 ML/MIN/1.73
GLOBULIN UR ELPH-MCNC: 2.3 GM/DL
GLUCOSE BLD-MCNC: 89 MG/DL (ref 65–99)
HCT VFR BLD AUTO: 39.6 % (ref 34–46.6)
HDLC SERPL-MCNC: 79 MG/DL (ref 40–60)
HGB BLD-MCNC: 12.9 G/DL (ref 12–15.9)
LDLC SERPL CALC-MCNC: 86 MG/DL (ref 0–100)
LDLC/HDLC SERPL: 1.09 {RATIO}
MCH RBC QN AUTO: 30.9 PG (ref 26.6–33)
MCHC RBC AUTO-ENTMCNC: 32.6 G/DL (ref 31.5–35.7)
MCV RBC AUTO: 94.7 FL (ref 79–97)
PLATELET # BLD AUTO: 208 10*3/MM3 (ref 140–450)
PMV BLD AUTO: 10.7 FL (ref 6–12)
POTASSIUM BLD-SCNC: 4.2 MMOL/L (ref 3.5–5.2)
PROT SERPL-MCNC: 6.6 G/DL (ref 6–8.5)
RBC # BLD AUTO: 4.18 10*6/MM3 (ref 3.77–5.28)
SODIUM BLD-SCNC: 144 MMOL/L (ref 136–145)
TRIGL SERPL-MCNC: 46 MG/DL (ref 0–150)
VLDLC SERPL-MCNC: 9.2 MG/DL (ref 5–40)
WBC NRBC COR # BLD: 4.64 10*3/MM3 (ref 3.4–10.8)

## 2019-10-23 PROCEDURE — 85027 COMPLETE CBC AUTOMATED: CPT | Performed by: INTERNAL MEDICINE

## 2019-10-23 PROCEDURE — 36415 COLL VENOUS BLD VENIPUNCTURE: CPT | Performed by: INTERNAL MEDICINE

## 2019-10-23 PROCEDURE — G0008 ADMIN INFLUENZA VIRUS VAC: HCPCS | Performed by: INTERNAL MEDICINE

## 2019-10-23 PROCEDURE — 82306 VITAMIN D 25 HYDROXY: CPT | Performed by: INTERNAL MEDICINE

## 2019-10-23 PROCEDURE — 80053 COMPREHEN METABOLIC PANEL: CPT | Performed by: INTERNAL MEDICINE

## 2019-10-23 PROCEDURE — 80061 LIPID PANEL: CPT | Performed by: INTERNAL MEDICINE

## 2019-10-23 PROCEDURE — 93000 ELECTROCARDIOGRAM COMPLETE: CPT | Performed by: INTERNAL MEDICINE

## 2019-10-23 PROCEDURE — 90653 IIV ADJUVANT VACCINE IM: CPT | Performed by: INTERNAL MEDICINE

## 2019-10-23 PROCEDURE — 99214 OFFICE O/P EST MOD 30 MIN: CPT | Performed by: INTERNAL MEDICINE

## 2019-10-23 RX ORDER — TRAMADOL HYDROCHLORIDE 50 MG/1
50 TABLET ORAL EVERY 8 HOURS PRN
Qty: 30 TABLET | Refills: 2 | Status: SHIPPED | OUTPATIENT
Start: 2019-10-23 | End: 2020-03-12 | Stop reason: SDUPTHER

## 2019-10-23 RX ORDER — CHLORAL HYDRATE 500 MG
CAPSULE ORAL
COMMUNITY
End: 2020-03-12

## 2019-10-23 NOTE — PROGRESS NOTES
ECG 12 Lead  Date/Time: 10/23/2019 7:02 PM  Performed by: Luis Davis MD  Authorized by: Luis Davis MD   Comparison: not compared with previous ECG   Rhythm: sinus rhythm  Rate: bradycardic  Conduction: left bundle branch block and 1st degree AV block  ST Segments: ST segments normal  T Waves: T waves normal  QRS axis: left  Other: no other findings    Clinical impression: abnormal EKG  Comments: EKG Interpretation Report    Heart rate:    60 beats/min, W[place] interval:  203 msec, QRS duration:  176 msec  QTu:450 msec, QTc:  451 msec

## 2019-10-23 NOTE — PROGRESS NOTES
Subjective   Lili Ruiz is a 74 y.o. female.     History of Present Illness   Patient was seen for syncope.  Patient has had 2 episodes over the past month where she lost consciousness for several seconds.  Patient has a history of atrial fib and left bundle branch block.  Patient is being treated by cardiology and 2D echo shows aortic regurg but it was 6 months ago.  Patient had a 72-hour Holter and 2D echo ordered. E KG did show left bundle branch block.  Patient's lipids controlled with diet exercise and a statin.  His major fibrillation is being controlled with Tambocor.  Patient's lipids also treated with diet exercise and medication.    Dictated utilizing Dragon dictation. If there are questions or for further clarification, please contact me.  The following portions of the patient's history were reviewed and updated as appropriate: allergies, current medications, past family history, past medical history, past social history, past surgical history and problem list.    Review of Systems   Constitutional: Negative for fatigue and fever.   HENT: Positive for congestion. Negative for trouble swallowing.    Eyes: Negative for discharge and visual disturbance.   Respiratory: Negative for choking and shortness of breath.    Cardiovascular: Negative for chest pain and palpitations.   Gastrointestinal: Negative for abdominal pain and blood in stool.   Endocrine: Negative.    Genitourinary: Negative for genital sores and hematuria.   Musculoskeletal: Negative for gait problem and joint swelling.   Skin: Negative for color change, pallor, rash and wound.   Allergic/Immunologic: Positive for environmental allergies. Negative for immunocompromised state.   Neurological: Positive for syncope. Negative for facial asymmetry and speech difficulty.   Psychiatric/Behavioral: Negative for hallucinations and suicidal ideas.       Objective   Physical Exam   Constitutional: She is oriented to person, place, and time. She  appears well-developed and well-nourished.   HENT:   Head: Normocephalic.   Eyes: Conjunctivae are normal. Pupils are equal, round, and reactive to light.   Neck: Normal range of motion. Neck supple.   Cardiovascular: Normal rate and regular rhythm. Exam reveals no gallop and no friction rub.   Murmur heard.  Pulmonary/Chest: Effort normal and breath sounds normal. No stridor. No respiratory distress. She has no wheezes. She has no rales. She exhibits no tenderness.   Abdominal: Soft. Bowel sounds are normal.   Musculoskeletal: Normal range of motion.   Neurological: She is alert and oriented to person, place, and time.   Skin: Skin is warm and dry.   Psychiatric: She has a normal mood and affect. Her behavior is normal. Judgment and thought content normal.   Nursing note and vitals reviewed.      Assessment/Plan 1 EKG 2/72-hour Holter #3 follow-up after testing.  Lili was seen today for med refill and loss of consciousness.    Diagnoses and all orders for this visit:    Mixed hyperlipidemia  -     CBC (No Diff)  -     Lipid Panel  -     Comprehensive Metabolic Panel  -     Vitamin D 25 Hydroxy    Essential hypertension  -     CBC (No Diff)  -     Lipid Panel  -     Comprehensive Metabolic Panel  -     Vitamin D 25 Hydroxy    Paroxysmal atrial fibrillation (CMS/HCC)  -     CBC (No Diff)  -     Lipid Panel  -     Comprehensive Metabolic Panel  -     Vitamin D 25 Hydroxy  -     ECG 12 Lead  -     Adult Transthoracic Echo Complete W/ Cont if Necessary Per Protocol; Future  -     Holter Monitor - 72 Hour Up To 21 Days; Future    Vitamin D deficiency, unspecified   -     Vitamin D 25 Hydroxy    Syncope, unspecified syncope type  -     Adult Transthoracic Echo Complete W/ Cont if Necessary Per Protocol; Future  -     Holter Monitor - 72 Hour Up To 21 Days; Future    Other orders  -     traMADol (ULTRAM) 50 MG tablet; Take 1 tablet by mouth Every 8 (Eight) Hours As Needed for Moderate Pain .  -     Fluad Tri 67yr  ([id number])

## 2019-11-01 ENCOUNTER — HOSPITAL ENCOUNTER (OUTPATIENT)
Dept: CARDIOLOGY | Facility: HOSPITAL | Age: 74
Discharge: HOME | End: 2019-11-01
Admitting: INTERNAL MEDICINE

## 2019-11-01 VITALS
SYSTOLIC BLOOD PRESSURE: 136 MMHG | HEIGHT: 62 IN | DIASTOLIC BLOOD PRESSURE: 88 MMHG | HEART RATE: 80 BPM | WEIGHT: 190 LBS | BODY MASS INDEX: 34.96 KG/M2

## 2019-11-01 DIAGNOSIS — R55 SYNCOPE, UNSPECIFIED SYNCOPE TYPE: ICD-10-CM

## 2019-11-01 DIAGNOSIS — I48.0 PAROXYSMAL ATRIAL FIBRILLATION (HCC): ICD-10-CM

## 2019-11-01 LAB
AORTIC ROOT ANNULUS: 1.9 CM
ASCENDING AORTA: 3.4 CM
BH CV ECHO MEAS - ACS: 2.1 CM
BH CV ECHO MEAS - AO MAX PG (FULL): 10.8 MMHG
BH CV ECHO MEAS - AO MAX PG: 16.4 MMHG
BH CV ECHO MEAS - AO MEAN PG (FULL): 5.3 MMHG
BH CV ECHO MEAS - AO MEAN PG: 8.8 MMHG
BH CV ECHO MEAS - AO V2 MAX: 202.2 CM/SEC
BH CV ECHO MEAS - AO V2 MEAN: 139.2 CM/SEC
BH CV ECHO MEAS - AO V2 VTI: 38.6 CM
BH CV ECHO MEAS - ASC AORTA: 3.4 CM
BH CV ECHO MEAS - AVA(I,A): 2 CM^2
BH CV ECHO MEAS - AVA(I,D): 2 CM^2
BH CV ECHO MEAS - AVA(V,A): 1.9 CM^2
BH CV ECHO MEAS - AVA(V,D): 1.9 CM^2
BH CV ECHO MEAS - BSA(HAYCOCK): 2 M^2
BH CV ECHO MEAS - BSA: 1.9 M^2
BH CV ECHO MEAS - BZI_BMI: 34.8 KILOGRAMS/M^2
BH CV ECHO MEAS - BZI_METRIC_HEIGHT: 157.5 CM
BH CV ECHO MEAS - BZI_METRIC_WEIGHT: 86.2 KG
BH CV ECHO MEAS - EDV(MOD-SP2): 114 ML
BH CV ECHO MEAS - EDV(MOD-SP4): 92 ML
BH CV ECHO MEAS - EDV(TEICH): 84.9 ML
BH CV ECHO MEAS - EF(CUBED): 68.4 %
BH CV ECHO MEAS - EF(MOD-BP): 67 %
BH CV ECHO MEAS - EF(MOD-SP2): 67.5 %
BH CV ECHO MEAS - EF(MOD-SP4): 65.2 %
BH CV ECHO MEAS - EF(TEICH): 60.2 %
BH CV ECHO MEAS - ESV(MOD-SP2): 37 ML
BH CV ECHO MEAS - ESV(MOD-SP4): 32 ML
BH CV ECHO MEAS - ESV(TEICH): 33.8 ML
BH CV ECHO MEAS - FS: 31.9 %
BH CV ECHO MEAS - IVS/LVPW: 0.87
BH CV ECHO MEAS - IVSD: 1 CM
BH CV ECHO MEAS - LAT PEAK E' VEL: 8 CM/SEC
BH CV ECHO MEAS - LV DIASTOLIC VOL/BSA (35-75): 49.2 ML/M^2
BH CV ECHO MEAS - LV MASS(C)D: 168.3 GRAMS
BH CV ECHO MEAS - LV MASS(C)DI: 90 GRAMS/M^2
BH CV ECHO MEAS - LV MAX PG: 5.5 MMHG
BH CV ECHO MEAS - LV MEAN PG: 3.5 MMHG
BH CV ECHO MEAS - LV SYSTOLIC VOL/BSA (12-30): 17.1 ML/M^2
BH CV ECHO MEAS - LV V1 MAX: 117.4 CM/SEC
BH CV ECHO MEAS - LV V1 MEAN: 90.8 CM/SEC
BH CV ECHO MEAS - LV V1 VTI: 24.2 CM
BH CV ECHO MEAS - LVIDD: 4.3 CM
BH CV ECHO MEAS - LVIDS: 3 CM
BH CV ECHO MEAS - LVLD AP2: 8.3 CM
BH CV ECHO MEAS - LVLD AP4: 8 CM
BH CV ECHO MEAS - LVLS AP2: 6.3 CM
BH CV ECHO MEAS - LVLS AP4: 6.3 CM
BH CV ECHO MEAS - LVOT AREA (M): 3.1 CM^2
BH CV ECHO MEAS - LVOT AREA: 3.2 CM^2
BH CV ECHO MEAS - LVOT DIAM: 2 CM
BH CV ECHO MEAS - LVPWD: 1.2 CM
BH CV ECHO MEAS - MED PEAK E' VEL: 7 CM/SEC
BH CV ECHO MEAS - MR MAX PG: 30.7 MMHG
BH CV ECHO MEAS - MR MAX VEL: 276.9 CM/SEC
BH CV ECHO MEAS - MV A DUR: 0.16 SEC
BH CV ECHO MEAS - MV A MAX VEL: 96.8 CM/SEC
BH CV ECHO MEAS - MV DEC SLOPE: 368.1 CM/SEC^2
BH CV ECHO MEAS - MV DEC TIME: 0.24 SEC
BH CV ECHO MEAS - MV E MAX VEL: 90.8 CM/SEC
BH CV ECHO MEAS - MV E/A: 0.94
BH CV ECHO MEAS - MV MAX PG: 5 MMHG
BH CV ECHO MEAS - MV MEAN PG: 2.4 MMHG
BH CV ECHO MEAS - MV P1/2T MAX VEL: 90.8 CM/SEC
BH CV ECHO MEAS - MV P1/2T: 72.3 MSEC
BH CV ECHO MEAS - MV V2 MAX: 111.3 CM/SEC
BH CV ECHO MEAS - MV V2 MEAN: 74.4 CM/SEC
BH CV ECHO MEAS - MV V2 VTI: 30.7 CM
BH CV ECHO MEAS - MVA P1/2T LCG: 2.4 CM^2
BH CV ECHO MEAS - MVA(P1/2T): 3 CM^2
BH CV ECHO MEAS - MVA(VTI): 2.5 CM^2
BH CV ECHO MEAS - PA ACC TIME: 0.12 SEC
BH CV ECHO MEAS - PA MAX PG (FULL): 3.6 MMHG
BH CV ECHO MEAS - PA MAX PG: 5.1 MMHG
BH CV ECHO MEAS - PA PR(ACCEL): 24.3 MMHG
BH CV ECHO MEAS - PA V2 MAX: 112.7 CM/SEC
BH CV ECHO MEAS - PULM A REVS DUR: 0.11 SEC
BH CV ECHO MEAS - PULM A REVS VEL: 31.4 CM/SEC
BH CV ECHO MEAS - PULM DIAS VEL: 39.5 CM/SEC
BH CV ECHO MEAS - PULM S/D: 1.8
BH CV ECHO MEAS - PULM SYS VEL: 70 CM/SEC
BH CV ECHO MEAS - PVA(V,A): 1.6 CM^2
BH CV ECHO MEAS - PVA(V,D): 1.6 CM^2
BH CV ECHO MEAS - QP/QS: 0.51
BH CV ECHO MEAS - RAP SYSTOLE: 8 MMHG
BH CV ECHO MEAS - RV MAX PG: 1.5 MMHG
BH CV ECHO MEAS - RV MEAN PG: 0.72 MMHG
BH CV ECHO MEAS - RV V1 MAX: 60.8 CM/SEC
BH CV ECHO MEAS - RV V1 MEAN: 39.4 CM/SEC
BH CV ECHO MEAS - RV V1 VTI: 13.2 CM
BH CV ECHO MEAS - RVOT AREA: 3 CM^2
BH CV ECHO MEAS - RVOT DIAM: 1.9 CM
BH CV ECHO MEAS - RVSP: 29 MMHG
BH CV ECHO MEAS - SI(CUBED): 29.9 ML/M^2
BH CV ECHO MEAS - SI(LVOT): 41.5 ML/M^2
BH CV ECHO MEAS - SI(MOD-SP2): 41.2 ML/M^2
BH CV ECHO MEAS - SI(MOD-SP4): 32.1 ML/M^2
BH CV ECHO MEAS - SI(TEICH): 27.3 ML/M^2
BH CV ECHO MEAS - SUP REN AO DIAM: 2.2 CM
BH CV ECHO MEAS - SV(CUBED): 55.9 ML
BH CV ECHO MEAS - SV(LVOT): 77.6 ML
BH CV ECHO MEAS - SV(MOD-SP2): 77 ML
BH CV ECHO MEAS - SV(MOD-SP4): 60 ML
BH CV ECHO MEAS - SV(RVOT): 39.2 ML
BH CV ECHO MEAS - SV(TEICH): 51.1 ML
BH CV ECHO MEAS - TAPSE (>1.6): 2.6 CM2
BH CV ECHO MEAS - TR MAX VEL: 228 CM/SEC
BH CV ECHO MEASUREMENTS AVERAGE E/E' RATIO: 12.11
BH CV XLRA - RV BASE: 2.9 CM
BH CV XLRA - RV LENGTH: 5 CM
BH CV XLRA - RV MID: 2.3 CM
BH CV XLRA - TDI S': 15 CM/SEC
LEFT ATRIUM VOLUME INDEX: 24 ML/M2
LV EF 2D ECHO EST: 67 %
SINUS: 2.6 CM
STJ: 2.7 CM

## 2019-11-01 PROCEDURE — 93306 TTE W/DOPPLER COMPLETE: CPT

## 2019-11-01 PROCEDURE — 93306 TTE W/DOPPLER COMPLETE: CPT | Performed by: INTERNAL MEDICINE

## 2019-11-04 PROBLEM — I34.81 MITRAL ANNULAR CALCIFICATION: Status: ACTIVE | Noted: 2019-11-04

## 2019-12-09 ENCOUNTER — TELEPHONE (OUTPATIENT)
Dept: FAMILY MEDICINE CLINIC | Facility: CLINIC | Age: 74
End: 2019-12-09

## 2019-12-09 NOTE — TELEPHONE ENCOUNTER
Patient has some premature atrial contractions and echo shows mitral annular calcifications otherwise normal

## 2019-12-10 NOTE — TELEPHONE ENCOUNTER
Only needs to be followed and yearly echo.  If PACs becomes symptomatic we will need to treat.  Patient episodes of dizziness was associated with normal sinus rhythm

## 2020-02-11 ENCOUNTER — TRANSCRIBE ORDERS (OUTPATIENT)
Dept: ADMINISTRATIVE | Facility: HOSPITAL | Age: 75
End: 2020-02-11

## 2020-02-11 DIAGNOSIS — Z12.39 SCREENING BREAST EXAMINATION: Primary | ICD-10-CM

## 2020-03-12 ENCOUNTER — OFFICE VISIT (OUTPATIENT)
Dept: FAMILY MEDICINE CLINIC | Facility: CLINIC | Age: 75
End: 2020-03-12

## 2020-03-12 VITALS
SYSTOLIC BLOOD PRESSURE: 100 MMHG | HEIGHT: 62 IN | TEMPERATURE: 98.1 F | HEART RATE: 65 BPM | OXYGEN SATURATION: 97 % | DIASTOLIC BLOOD PRESSURE: 64 MMHG | WEIGHT: 202 LBS | BODY MASS INDEX: 37.17 KG/M2 | RESPIRATION RATE: 16 BRPM

## 2020-03-12 DIAGNOSIS — I48.0 PAROXYSMAL ATRIAL FIBRILLATION (HCC): ICD-10-CM

## 2020-03-12 DIAGNOSIS — I10 ESSENTIAL HYPERTENSION: Primary | ICD-10-CM

## 2020-03-12 DIAGNOSIS — E78.00 PURE HYPERCHOLESTEROLEMIA: ICD-10-CM

## 2020-03-12 PROCEDURE — 99214 OFFICE O/P EST MOD 30 MIN: CPT | Performed by: INTERNAL MEDICINE

## 2020-03-12 RX ORDER — TRAMADOL HYDROCHLORIDE 50 MG/1
50 TABLET ORAL EVERY 8 HOURS PRN
Qty: 30 TABLET | Refills: 2 | Status: SHIPPED | OUTPATIENT
Start: 2020-03-12 | End: 2020-10-07 | Stop reason: SDUPTHER

## 2020-03-12 RX ORDER — PRAVASTATIN SODIUM 20 MG
20 TABLET ORAL DAILY
Qty: 30 TABLET | Refills: 3 | Status: SHIPPED | OUTPATIENT
Start: 2020-03-12 | End: 2022-03-03 | Stop reason: SDUPTHER

## 2020-03-12 RX ORDER — AMOXICILLIN 875 MG/1
875 TABLET, COATED ORAL 2 TIMES DAILY
Qty: 20 TABLET | Refills: 0 | Status: SHIPPED | OUTPATIENT
Start: 2020-03-12 | End: 2020-10-13

## 2020-03-12 NOTE — PROGRESS NOTES
Subjective   Lili Ruiz is a 75 y.o. female.     History of Present Illness   Patient was seen for hypertension.  Blood pressures been running 110s over 60s.  Patient had stopped her ACE inhibitor and she was informed she did not need it at this time.  Patient's lipids are being treated with diet exercise and a statin.  Triglycerides 68, HDL 76, LDL 97.  Patient was advised to continue present diet and activity levels.  Patient does have paroxysmal atrial fibrillation and is being treated with Tambocor for rhythm control and Eliquis to prevent clots.    Dictated utilizing Dragon dictation. If there are questions or for further clarification, please contact me.  The following portions of the patient's history were reviewed and updated as appropriate: allergies, current medications, past family history, past medical history, past social history, past surgical history and problem list.    Review of Systems   Constitutional: Negative for fatigue and fever.   HENT: Positive for congestion. Negative for trouble swallowing.    Eyes: Negative for discharge and visual disturbance.   Respiratory: Negative for choking and shortness of breath.    Cardiovascular: Negative for chest pain and palpitations.   Gastrointestinal: Negative for abdominal pain and blood in stool.   Endocrine: Negative.    Genitourinary: Negative for genital sores and hematuria.   Musculoskeletal: Negative for gait problem and joint swelling.   Skin: Negative for color change, pallor, rash and wound.   Allergic/Immunologic: Positive for environmental allergies. Negative for immunocompromised state.   Neurological: Negative for facial asymmetry and speech difficulty.   Psychiatric/Behavioral: Negative for hallucinations and suicidal ideas.       Objective   Physical Exam   Constitutional: She is oriented to person, place, and time. She appears well-developed and well-nourished.   HENT:   Head: Normocephalic.   Eyes: Pupils are equal, round, and  reactive to light. Conjunctivae are normal.   Neck: Normal range of motion. Neck supple.   Cardiovascular: Normal rate, regular rhythm and normal heart sounds.   Pulmonary/Chest: Effort normal and breath sounds normal.   Abdominal: Soft. Bowel sounds are normal.   Musculoskeletal: Normal range of motion.   Neurological: She is alert and oriented to person, place, and time.   Skin: Skin is warm and dry.   Psychiatric: She has a normal mood and affect. Her behavior is normal. Judgment and thought content normal.   Nursing note and vitals reviewed.      Assessment/Plan 1 monitor blood pressure at home #2 labs #3 continue present diet and activity levels  Lili was seen today for annual exam, sore throat and med refill.    Diagnoses and all orders for this visit:    Essential hypertension    Pure hypercholesterolemia    Paroxysmal atrial fibrillation (CMS/HCC)    Other orders  -     traMADol (ULTRAM) 50 MG tablet; Take 1 tablet by mouth Every 8 (Eight) Hours As Needed for Moderate Pain .  -     pravastatin (PRAVACHOL) 20 MG tablet; Take 1 tablet by mouth Daily.  -     amoxicillin (AMOXIL) 875 MG tablet; Take 1 tablet by mouth 2 (Two) Times a Day.

## 2020-03-21 ENCOUNTER — APPOINTMENT (OUTPATIENT)
Dept: MAMMOGRAPHY | Facility: HOSPITAL | Age: 75
End: 2020-03-21

## 2020-05-30 ENCOUNTER — HOSPITAL ENCOUNTER (OUTPATIENT)
Dept: MAMMOGRAPHY | Facility: HOSPITAL | Age: 75
Discharge: HOME | End: 2020-05-30
Admitting: INTERNAL MEDICINE

## 2020-05-30 DIAGNOSIS — Z12.39 SCREENING BREAST EXAMINATION: ICD-10-CM

## 2020-05-30 PROCEDURE — 77063 BREAST TOMOSYNTHESIS BI: CPT

## 2020-05-30 PROCEDURE — 77067 SCR MAMMO BI INCL CAD: CPT

## 2020-08-21 ENCOUNTER — TELEPHONE (OUTPATIENT)
Dept: FAMILY MEDICINE CLINIC | Facility: CLINIC | Age: 75
End: 2020-08-21

## 2020-08-21 RX ORDER — LISINOPRIL 40 MG/1
40 TABLET ORAL DAILY
Qty: 90 TABLET | Refills: 1 | Status: SHIPPED | OUTPATIENT
Start: 2020-08-21 | End: 2021-02-05 | Stop reason: SDUPTHER

## 2020-08-21 NOTE — TELEPHONE ENCOUNTER
Caller: Lili Ruiz    Relationship: Self    Best call back number:[phone]     Medication needed: LISINOPRIL 20MG/DAY     When do you need the refill by:ASAP   What details did the patient provide when requesting the medication: WOULD LIKE TO START ON BP MEDICATION. IT /100. AND HAS BEEN RUNNING THIS WAY.   Does the patient have less than a 3 day supply:  [x] Yes  [] No    What is the patient's preferred pharmacy:    [id number] [address] AT [facility]([place] - [phone] [facility] [phone]   [phone]  Associate Signed OrdersPatient EstimateProvidersCurrent Interactions

## 2020-10-07 RX ORDER — TRAMADOL HYDROCHLORIDE 50 MG/1
50 TABLET ORAL EVERY 8 HOURS PRN
Qty: 30 TABLET | Refills: 2 | Status: SHIPPED | OUTPATIENT
Start: 2020-10-07 | End: 2021-02-05 | Stop reason: SDUPTHER

## 2020-10-07 NOTE — TELEPHONE ENCOUNTER
Caller: Lili Ruiz    Relationship: Self    Best call back number: [phone]    Medication needed:   Requested Prescriptions     Pending Prescriptions Disp Refills   • traMADol (ULTRAM) 50 MG tablet 30 tablet 2     Sig: Take 1 tablet by mouth Every 8 (Eight) Hours As Needed for Moderate Pain .       What details did the patient provide when requesting the medication: PLEASE CALL IF PT NEEDS APPT IN ORDER TO REFILL MEDICATION     What is the patient's preferred pharmacy: [facility] #[id number] [place] [address] [address] AT [facility] [phone] [facility] [phone] FX

## 2020-10-13 ENCOUNTER — OFFICE VISIT (OUTPATIENT)
Dept: FAMILY MEDICINE CLINIC | Facility: CLINIC | Age: 75
End: 2020-10-13

## 2020-10-13 DIAGNOSIS — J02.9 PHARYNGITIS, UNSPECIFIED ETIOLOGY: Primary | ICD-10-CM

## 2020-10-13 PROBLEM — Z79.01 CHRONIC ANTICOAGULATION: Status: ACTIVE | Noted: 2020-05-04

## 2020-10-13 PROCEDURE — 99442 PR PHYS/QHP TELEPHONE EVALUATION 11-20 MIN: CPT | Performed by: NURSE PRACTITIONER

## 2020-10-13 RX ORDER — AZITHROMYCIN 250 MG/1
TABLET, FILM COATED ORAL
Qty: 6 TABLET | Refills: 0 | Status: SHIPPED | OUTPATIENT
Start: 2020-10-13 | End: 2020-10-28

## 2020-10-13 NOTE — PROGRESS NOTES
Subjective   Lili Ruiz is a 75 y.o. female.     History of Present Illness   C/o sore throat beginning 5 days ago pain 7/10 up to 10/10 as day progresses, no OTC, with swollen glands, no ear pain or sinus pain, no fevers, cough SOA or wheezing, lives with  and no known sick exposure, Allergic sotalol, hydrocodone, states got COVID test yesterday [place] awaiting results, Last tx sore throat 03/12/20 amox 875 mg BID x 10 days but caused SE nausea and palpitations with chronic atrial fibrillation and HTN on eliquis 5 mg BID, lisinopril 40 mg , flecainide 100 mg no CP dizziness HA LE edema, or palpitations    The following portions of the patient's history were reviewed and updated as appropriate: allergies, current medications, past family history, past medical history, past social history, past surgical history and problem list.    Review of Systems   Constitutional: Negative for fatigue and fever.   HENT: Positive for sore throat. Negative for congestion, dental problem, drooling, ear discharge, ear pain, facial swelling, hearing loss, mouth sores, nosebleeds, postnasal drip, rhinorrhea, sinus pressure, sinus pain, sneezing, tinnitus, trouble swallowing and voice change.    Respiratory: Negative for cough, shortness of breath and wheezing.    Cardiovascular: Negative for chest pain, palpitations and leg swelling.   Gastrointestinal: Negative for abdominal pain, anal bleeding, blood in stool, constipation, diarrhea, nausea, rectal pain and vomiting.   Musculoskeletal: Positive for arthralgias and back pain.   Allergic/Immunologic: Negative for environmental allergies.   Neurological: Negative for dizziness and headaches.   All other systems reviewed and are negative.      Objective   Physical Exam  Pulmonary:      Effort: Pulmonary effort is normal.      Breath sounds: Normal breath sounds.   Neurological:      Mental Status: She is alert and oriented to person, place, and time.   Psychiatric:          Mood and Affect: Mood normal.         Behavior: Behavior normal.         Thought Content: Thought content normal.         Judgment: Judgment normal.       Assessment/Plan   Diagnoses and all orders for this visit:    1. Pharyngitis, unspecified etiology (Primary)    Other orders  -     azithromycin (Zithromax Z-Balaji) 250 MG tablet; Take 2 tablets the first day, then 1 tablet daily for 4 days for sore throat  Dispense: 6 tablet; Refill: 0    You have chosen to receive care through a telephone visit. Do you consent to use a telephone visit for your medical care today? Yes, spent 11 min on phone with patient, erx zpack use as directed, Change toothbrush in 24 hours, warm salt water rinses, tylenol prn pain, call if sx persist or worsen

## 2020-10-26 ENCOUNTER — TELEPHONE (OUTPATIENT)
Dept: FAMILY MEDICINE CLINIC | Facility: CLINIC | Age: 75
End: 2020-10-26

## 2020-10-26 NOTE — TELEPHONE ENCOUNTER
Admission Medication Reconciliation:    Information obtained from: medication list provided from [facility] (dated 17)    Significant PMH/Disease States:   Past Medical History:   Diagnosis Date    Asthma     seldom,use inhaler    Bronchitis     Chronic obstructive pulmonary disease ([address]     Depression     anxiety    Psychiatric disorder     paranoid schizophrenia    Psychiatric disorder     extrapyramidal disease    Psychotic disorder     mental retardation       Chief Complaint for this Admission:  difficulty breathing    Allergies:  Review of patient's allergies indicates no known allergies. Prior to Admission Medications:   Prior to Admission Medications   Prescriptions Last Dose Informant Patient Reported? Taking? ALPRAZolam (XANAX) 0.5 mg tablet   Yes Yes   Sig: Take 0.5 mg by mouth two (2) times daily as needed for Anxiety. PARoxetine (PAXIL) 40 mg tablet   Yes Yes   Sig: Take 40 mg by mouth daily. VENTOLIN HFA 90 mcg/actuation inhaler   No Yes   Sig: INHALE 2 PUFFS EVERY 4 TO 6 HOURS AS NEEDED FOR SHORTNESS OF BREATH   albuterol (PROVENTIL VENTOLIN) 2.5 mg /3 mL (0.083 %) nebulizer solution   Yes Yes   Si.5 mg by Nebulization route every six (6) hours as needed for Wheezing. benztropine (COGENTIN) 2 mg tablet   Yes Yes   Sig: Take 2 mg by mouth nightly as needed (extrapyramidal disease). fluticasone-vilanterol (BREO ELLIPTA) 100-25 mcg/dose inhaler   No Yes   Sig: Take 1 Puff by inhalation daily. guaiFENesin (ROBITUSSIN) 100 mg/5 mL liquid   Yes Yes   Sig: Take 200 mg by mouth every four (4) hours as needed for Cough. ibuprofen (MOTRIN) 800 mg tablet   Yes Yes   Si mg every eight (8) hours as needed for Pain. risperiDONE (RISPERDAL) 3 mg tablet   Yes Yes   Sig: Take 3 mg by mouth nightly. temazepam (RESTORIL) 30 mg capsule   Yes Yes   Sig: Take 30 mg by mouth nightly.       Facility-Administered Medications: None       Comments/Recommendations:   No PATIENT HAD A TELEPHONE APPOINTMENT ON 10-13-20 WITH TEDDY GAVIN AND WAS GIVEN A ANTIBIOTIC FOR  A  SORE THROAT    PATIENT WAS TOLD TO CALL BACK IF THE MEDICATION FOR HER THROAT WAS NOT  HELPING    PATIENT IS CALLING BECAUSE SHE STILL HAS THE SORE THROAT AND NOW ALSO HAS REALLY BAD DIARRHEA    PATIENT HAS BEEN HAVING DIARRHEA EVERYDAY FOR THE LAST 2 WEEKS      PLEASE CONTACT PATIENT @[phone]   recommendations at this time. Patient started a 3 day course of azithromycin 250mg and prednisone 20mg on 7/7/17. Patient should be finished with the course at this time (d/c medications on PTA Meds). Estefany Glass. D.  Candidate 2018

## 2020-10-27 NOTE — TELEPHONE ENCOUNTER
Patient calling back in requesting a call back regarding diarrhea and a sore throat.     Best call back # [phone]

## 2020-10-28 ENCOUNTER — APPOINTMENT (OUTPATIENT)
Dept: CT IMAGING | Facility: HOSPITAL | Age: 75
End: 2020-10-28

## 2020-10-28 ENCOUNTER — TELEPHONE (OUTPATIENT)
Dept: FAMILY MEDICINE CLINIC | Facility: CLINIC | Age: 75
End: 2020-10-28

## 2020-10-28 ENCOUNTER — HOSPITAL ENCOUNTER (EMERGENCY)
Facility: HOSPITAL | Age: 75
Discharge: HOME | End: 2020-10-28
Attending: EMERGENCY MEDICINE | Admitting: EMERGENCY MEDICINE

## 2020-10-28 ENCOUNTER — OFFICE VISIT (OUTPATIENT)
Dept: FAMILY MEDICINE CLINIC | Facility: CLINIC | Age: 75
End: 2020-10-28

## 2020-10-28 VITALS
HEART RATE: 71 BPM | HEIGHT: 62 IN | TEMPERATURE: 98.8 F | OXYGEN SATURATION: 98 % | DIASTOLIC BLOOD PRESSURE: 90 MMHG | BODY MASS INDEX: 37.17 KG/M2 | SYSTOLIC BLOOD PRESSURE: 168 MMHG | WEIGHT: 202 LBS | RESPIRATION RATE: 20 BRPM

## 2020-10-28 DIAGNOSIS — I48.0 PAROXYSMAL ATRIAL FIBRILLATION (HCC): ICD-10-CM

## 2020-10-28 DIAGNOSIS — R19.7 DIARRHEA, UNSPECIFIED TYPE: Primary | ICD-10-CM

## 2020-10-28 DIAGNOSIS — R15.9 INCONTINENCE OF FECES, UNSPECIFIED FECAL INCONTINENCE TYPE: ICD-10-CM

## 2020-10-28 LAB
ADV 40+41 DNA STL QL NAA+NON-PROBE: NOT DETECTED
ALBUMIN SERPL-MCNC: 4.7 G/DL (ref 3.5–5.2)
ALBUMIN/GLOB SERPL: 1.8 G/DL
ALP SERPL-CCNC: 69 U/L (ref 39–117)
ALT SERPL W P-5'-P-CCNC: 19 U/L (ref 1–33)
ANION GAP SERPL CALCULATED.3IONS-SCNC: 9.6 MMOL/L (ref 5–15)
AST SERPL-CCNC: 19 U/L (ref 1–32)
ASTRO TYP 1-8 RNA STL QL NAA+NON-PROBE: NOT DETECTED
BASOPHILS # BLD AUTO: 0.01 10*3/MM3 (ref 0–0.2)
BASOPHILS NFR BLD AUTO: 0.2 % (ref 0–1.5)
BILIRUB SERPL-MCNC: 0.7 MG/DL (ref 0–1.2)
BUN SERPL-MCNC: 15 MG/DL (ref 8–23)
BUN/CREAT SERPL: 21.1 (ref 7–25)
C CAYETANENSIS DNA STL QL NAA+NON-PROBE: NOT DETECTED
C DIFF TOX GENS STL QL NAA+PROBE: NEGATIVE
CALCIUM SPEC-SCNC: 10.3 MG/DL (ref 8.6–10.5)
CAMPY SP DNA.DIARRHEA STL QL NAA+PROBE: NOT DETECTED
CHLORIDE SERPL-SCNC: 103 MMOL/L (ref 98–107)
CO2 SERPL-SCNC: 28.4 MMOL/L (ref 22–29)
CREAT SERPL-MCNC: 0.71 MG/DL (ref 0.57–1)
CRYPTOSP STL CULT: NOT DETECTED
DEPRECATED RDW RBC AUTO: 40.6 FL (ref 37–54)
E COLI DNA SPEC QL NAA+PROBE: NOT DETECTED
E HISTOLYT AG STL-ACNC: NOT DETECTED
EAEC PAA PLAS AGGR+AATA ST NAA+NON-PRB: NOT DETECTED
EC STX1 + STX2 GENES STL NAA+PROBE: NOT DETECTED
EOSINOPHIL # BLD AUTO: 0.01 10*3/MM3 (ref 0–0.4)
EOSINOPHIL NFR BLD AUTO: 0.2 % (ref 0.3–6.2)
EPEC EAE GENE STL QL NAA+NON-PROBE: NOT DETECTED
ERYTHROCYTE [DISTWIDTH] IN BLOOD BY AUTOMATED COUNT: 12.7 % (ref 12.3–15.4)
ETEC LTA+ST1A+ST1B TOX ST NAA+NON-PROBE: NOT DETECTED
G LAMBLIA DNA SPEC QL NAA+PROBE: NOT DETECTED
GFR SERPL CREATININE-BSD FRML MDRD: 81 ML/MIN/1.73
GLOBULIN UR ELPH-MCNC: 2.6 GM/DL
GLUCOSE SERPL-MCNC: 87 MG/DL (ref 65–99)
HCT VFR BLD AUTO: 47.3 % (ref 34–46.6)
HGB BLD-MCNC: 15.9 G/DL (ref 12–15.9)
IMM GRANULOCYTES # BLD AUTO: 0.01 10*3/MM3 (ref 0–0.05)
IMM GRANULOCYTES NFR BLD AUTO: 0.2 % (ref 0–0.5)
LIPASE SERPL-CCNC: 25 U/L (ref 13–60)
LYMPHOCYTES # BLD AUTO: 1.65 10*3/MM3 (ref 0.7–3.1)
LYMPHOCYTES NFR BLD AUTO: 40.2 % (ref 19.6–45.3)
MCH RBC QN AUTO: 29.6 PG (ref 26.6–33)
MCHC RBC AUTO-ENTMCNC: 33.6 G/DL (ref 31.5–35.7)
MCV RBC AUTO: 87.9 FL (ref 79–97)
MONOCYTES # BLD AUTO: 0.34 10*3/MM3 (ref 0.1–0.9)
MONOCYTES NFR BLD AUTO: 8.3 % (ref 5–12)
NEUTROPHILS NFR BLD AUTO: 2.08 10*3/MM3 (ref 1.7–7)
NEUTROPHILS NFR BLD AUTO: 50.9 % (ref 42.7–76)
NOROVIRUS GI+II RNA STL QL NAA+NON-PROBE: NOT DETECTED
NRBC BLD AUTO-RTO: 0 /100 WBC (ref 0–0.2)
P SHIGELLOIDES DNA STL QL NAA+PROBE: NOT DETECTED
PLATELET # BLD AUTO: 200 10*3/MM3 (ref 140–450)
PMV BLD AUTO: 10.2 FL (ref 6–12)
POTASSIUM SERPL-SCNC: 4.1 MMOL/L (ref 3.5–5.2)
PROT SERPL-MCNC: 7.3 G/DL (ref 6–8.5)
RBC # BLD AUTO: 5.38 10*6/MM3 (ref 3.77–5.28)
RV RNA STL NAA+PROBE: NOT DETECTED
SALMONELLA DNA SPEC QL NAA+PROBE: NOT DETECTED
SAPO I+II+IV+V RNA STL QL NAA+NON-PROBE: NOT DETECTED
SHIGELLA SP+EIEC IPAH STL QL NAA+PROBE: NOT DETECTED
SODIUM SERPL-SCNC: 141 MMOL/L (ref 136–145)
V CHOLERAE DNA SPEC QL NAA+PROBE: NOT DETECTED
VIBRIO DNA SPEC NAA+PROBE: NOT DETECTED
WBC # BLD AUTO: 4.1 10*3/MM3 (ref 3.4–10.8)
YERSINIA STL CULT: NOT DETECTED

## 2020-10-28 PROCEDURE — 85025 COMPLETE CBC W/AUTO DIFF WBC: CPT | Performed by: PHYSICIAN ASSISTANT

## 2020-10-28 PROCEDURE — 99283 EMERGENCY DEPT VISIT LOW MDM: CPT

## 2020-10-28 PROCEDURE — 87493 C DIFF AMPLIFIED PROBE: CPT | Performed by: PHYSICIAN ASSISTANT

## 2020-10-28 PROCEDURE — 0097U HC BIOFIRE FILMARRAY GI PANEL: CPT | Performed by: PHYSICIAN ASSISTANT

## 2020-10-28 PROCEDURE — 83690 ASSAY OF LIPASE: CPT | Performed by: PHYSICIAN ASSISTANT

## 2020-10-28 PROCEDURE — 80053 COMPREHEN METABOLIC PANEL: CPT | Performed by: PHYSICIAN ASSISTANT

## 2020-10-28 PROCEDURE — 99442 PR PHYS/QHP TELEPHONE EVALUATION 11-20 MIN: CPT | Performed by: NURSE PRACTITIONER

## 2020-10-28 PROCEDURE — 25010000002 IOPAMIDOL 61 % SOLUTION: Performed by: EMERGENCY MEDICINE

## 2020-10-28 PROCEDURE — 74177 CT ABD & PELVIS W/CONTRAST: CPT

## 2020-10-28 RX ORDER — SODIUM CHLORIDE 0.9 % (FLUSH) 0.9 %
10 SYRINGE (ML) INJECTION AS NEEDED
Status: DISCONTINUED | OUTPATIENT
Start: 2020-10-28 | End: 2020-10-28 | Stop reason: HOSPADM

## 2020-10-28 RX ORDER — OCTISALATE, AVOBENZONE, HOMOSALATE, AND OCTOCRYLENE 29.4; 29.4; 49; 25.48 MG/ML; MG/ML; MG/ML; MG/ML
LOTION TOPICAL
Qty: 30 CAPSULE | Refills: 2 | Status: SHIPPED | OUTPATIENT
Start: 2020-10-28 | End: 2021-01-13

## 2020-10-28 RX ADMIN — SODIUM CHLORIDE 1000 ML: 9 INJECTION, SOLUTION INTRAVENOUS at 10:36

## 2020-10-28 RX ADMIN — IOPAMIDOL 85 ML: 612 INJECTION, SOLUTION INTRAVENOUS at 13:52

## 2020-10-28 NOTE — TELEPHONE ENCOUNTER
I called patient informed her gastro referral put in and informed her probiotic sent to pharmacy as well.

## 2020-10-28 NOTE — PROGRESS NOTES
Subjective   Lili Ruiz is a 75 y.o. female.     History of Present Illness   C/o intermittent diarrhea with flairs > 2 years assoc with change in diet and would manage with diet and bowel rest, notes sx worsening over the last 2 weeks yellow color with strong odor with fecal incontinence daily, recently tx pharyngitis zpack 10/13/20 with some worsening but tolerable, now states unable to eat generally and only eating 6 olives for the 2 days, last colonoscopy 2014 or 2015 and states got card reminder overdue from gastro can't remember name, no record in chart, with hx of breast ca last saw Dr Arsh Esteves 06/17 and now getting mammograms through primary care Dr Davis regular PCP last mammo 05/20 normal, with chronic atrial fibrillation, HTN, chol on eliquis 5 mg BID, lisinopril 40 mg , flecainide 100 mg last saw cardiology Dr Lewis 05/20, no CP dizziness LE edema, or palpitations, but intermittent HA attributes to no food, hasn't tried OTC antidiarrheal d/t concern interaction with afib medications, with chronic gerd not currently taking omeprazole, last COVID-19 test 10/12/20 negative and no known exposure    The following portions of the patient's history were reviewed and updated as appropriate: allergies, current medications, past family history, past medical history, past social history, past surgical history and problem list.    Review of Systems   Constitutional: Positive for activity change, appetite change and fatigue. Negative for fever.   Respiratory: Negative for cough, shortness of breath and wheezing.    Cardiovascular: Negative for chest pain, palpitations and leg swelling.   Gastrointestinal: Positive for abdominal pain and diarrhea. Negative for abdominal distention, anal bleeding, blood in stool, constipation, nausea, rectal pain and vomiting.   Neurological: Positive for weakness and headaches. Negative for dizziness.   Psychiatric/Behavioral: Positive for sleep disturbance. Negative for  hallucinations, self-injury and suicidal ideas. The patient is nervous/anxious. The patient is not hyperactive.    All other systems reviewed and are negative.      Objective   Physical Exam  Pulmonary:      Effort: Pulmonary effort is normal.      Breath sounds: Normal breath sounds.   Neurological:      Mental Status: She is alert and oriented to person, place, and time.   Psychiatric:         Behavior: Behavior normal.         Thought Content: Thought content normal.         Judgment: Judgment normal.      Comments: Anxious, tearful       Assessment/Plan   Diagnoses and all orders for this visit:    1. Diarrhea, unspecified type (Primary)    2. Incontinence of feces, unspecified fecal incontinence type    3. Paroxysmal atrial fibrillation (CMS/[facility])    You have chosen to receive care through a telephone visit. Do you consent to use a telephone visit for your medical care today? Yes spent 13 minutes on phone with patient, advised to go to [facility] ER for eval possible gastritis, enteritis, c.dif infection, may need rehydration IV fluids and FU with gastro

## 2020-10-28 NOTE — TELEPHONE ENCOUNTER
PATIENT CALLED AND STATED THAT SHE WENT TO THE ER TODAY DUE TO DIARRHEA. PATIENT REQUESTING A REFERRAL FOR GASTRO. THE PATIENT DID NOT WANT TO MAKE A ER FOLLOW UP WITH DR. DUMAS DUE TO THE EXPLOSIVE DIARRHEA ISSUE THAT SHE IS HAVING. PATIENT WENT TO [facility] ER.    PATIENT CALLBACK: [phone]

## 2020-10-29 ENCOUNTER — TELEPHONE (OUTPATIENT)
Dept: GASTROENTEROLOGY | Facility: CLINIC | Age: 75
End: 2020-10-29

## 2021-01-13 RX ORDER — ALUMINUM ZIRCONIUM OCTACHLOROHYDREX GLY 16 G/100G
GEL TOPICAL
Qty: 30 CAPSULE | Refills: 2 | Status: SHIPPED | OUTPATIENT
Start: 2021-01-13 | End: 2022-10-12

## 2021-01-25 ENCOUNTER — OFFICE VISIT (OUTPATIENT)
Dept: GASTROENTEROLOGY | Facility: CLINIC | Age: 76
End: 2021-01-25

## 2021-01-25 VITALS — HEIGHT: 63 IN | WEIGHT: 237.4 LBS | BODY MASS INDEX: 42.06 KG/M2

## 2021-01-25 DIAGNOSIS — R13.10 DYSPHAGIA, UNSPECIFIED TYPE: Primary | ICD-10-CM

## 2021-01-25 DIAGNOSIS — K59.1 FUNCTIONAL DIARRHEA: ICD-10-CM

## 2021-01-25 PROCEDURE — 99204 OFFICE O/P NEW MOD 45 MIN: CPT | Performed by: INTERNAL MEDICINE

## 2021-01-25 RX ORDER — MULTIVIT WITH MINERALS/LUTEIN
500 TABLET ORAL DAILY
COMMUNITY

## 2021-01-25 RX ORDER — SODIUM CHLORIDE, SODIUM LACTATE, POTASSIUM CHLORIDE, CALCIUM CHLORIDE 600; 310; 30; 20 MG/100ML; MG/100ML; MG/100ML; MG/100ML
30 INJECTION, SOLUTION INTRAVENOUS CONTINUOUS
Status: CANCELLED | OUTPATIENT
Start: 2021-02-24

## 2021-01-25 NOTE — PROGRESS NOTES
Chief Complaint   Patient presents with   • Follow-up   • Diarrhea     Subjective   HPI  Lili Ruiz is a 75 y.o. female who presents today for new patient evaluation.    She complains of diarrhea.  Symptoms long standing, dating back >10 years.  Initially very intermittent, worse over last few years.  She has had periods of fecal incontinence.  Denies blood/mucous in stool.  She is never had prior GI evaluation for the symptoms although she did have a negative colonoscopy in 2014 for routine colon cancer screening.  She recently presented to the ER in October for these symptoms.  She had a CT scan of the abdomen and pelvis which showed a paucity of formed stool but no evidence of obvious bowel wall thickening or colitis.  Still has since that time she started over-the-counter align and has had significant improvement in her symptoms.    Addition to her diarrhea she reports occasional sensation of a lump in her throat and food sticking she can localizes this to her sternal notch.  This is fairly infrequent and also somewhat longstanding.  She had a normal EGD at the time of her colonoscopy in 2014.    She has hx of Afib on Warfarin      Objective   There were no vitals filed for this visit.  Physical Exam  Vitals signs and nursing note reviewed.   Constitutional:       Appearance: She is well-developed.   HENT:      Head: Normocephalic and atraumatic.   Eyes:      Pupils: Pupils are equal, round, and reactive to light.   Cardiovascular:      Rate and Rhythm: Normal rate and regular rhythm.      Heart sounds: Normal heart sounds.   Pulmonary:      Effort: Pulmonary effort is normal.      Breath sounds: Normal breath sounds.   Abdominal:      General: Bowel sounds are normal. There is no distension or abdominal bruit.      Palpations: Abdomen is soft. Abdomen is not rigid. There is no shifting dullness, fluid wave, mass or pulsatile mass.      Tenderness: There is no abdominal tenderness. There is no guarding.       Hernia: No hernia is present.   Musculoskeletal: Normal range of motion.   Skin:     General: Skin is warm and dry.   Neurological:      Mental Status: She is alert and oriented to person, place, and time.   Psychiatric:         Behavior: Behavior normal.         Thought Content: Thought content normal.       The following data was reviewed by: Joel Galan MD on 01/25/2021:  Common labs    Common Labsle 10/28/20 10/28/20    1036 1036   BUN  15   Creatinine  0.71   eGFR Non African Am  81   Sodium  141   Potassium  4.1   Chloride  103   Calcium  10.3   Albumin  4.70   Total Bilirubin  0.7   Alkaline Phosphatase  69   AST (SGOT)  19   ALT (SGPT)  19   WBC 4.10    Hemoglobin 15.9    Hematocrit 47.3 (A)    Platelets 200    (A) Abnormal value            Data reviewed: Recent hospitalization notes ER visit from 10/2020     Assessment/Plan   Assessment:     1. Dysphagia, unspecified type    2. Functional diarrhea      Plan:   Will schedule for EGD/colonoscopy  She will need to reach out to her cardiologist to get OK to hold Eliquis for 48hrs prior  Continue Align          Joel Galan M.D.  [facility]  [address] [place] [address]  Office: [phone]

## 2021-02-05 ENCOUNTER — TELEPHONE (OUTPATIENT)
Dept: FAMILY MEDICINE CLINIC | Facility: CLINIC | Age: 76
End: 2021-02-05

## 2021-02-05 ENCOUNTER — TELEPHONE (OUTPATIENT)
Dept: GASTROENTEROLOGY | Facility: CLINIC | Age: 76
End: 2021-02-05

## 2021-02-05 RX ORDER — TRAMADOL HYDROCHLORIDE 50 MG/1
50 TABLET ORAL EVERY 8 HOURS PRN
Qty: 30 TABLET | Refills: 2 | Status: SHIPPED | OUTPATIENT
Start: 2021-02-05 | End: 2021-12-07 | Stop reason: SDUPTHER

## 2021-02-05 RX ORDER — LISINOPRIL 40 MG/1
40 TABLET ORAL DAILY
Qty: 90 TABLET | Refills: 1 | Status: SHIPPED | OUTPATIENT
Start: 2021-02-05 | End: 2021-08-02

## 2021-02-05 NOTE — TELEPHONE ENCOUNTER
----- Message from Prashanth Mendez sent at 2/5/2021  9:18 AM EST -----  Regarding: reschedule 2/24/21  Contact: [phone]  Pt called to reschedule 2/24/21. Please call pt. Thank you

## 2021-02-26 ENCOUNTER — TRANSCRIBE ORDERS (OUTPATIENT)
Dept: LAB | Facility: HOSPITAL | Age: 76
End: 2021-02-26

## 2021-02-26 DIAGNOSIS — Z01.818 OTHER SPECIFIED PRE-OPERATIVE EXAMINATION: Primary | ICD-10-CM

## 2021-03-08 ENCOUNTER — LAB (OUTPATIENT)
Dept: LAB | Facility: HOSPITAL | Age: 76
End: 2021-03-08

## 2021-03-08 DIAGNOSIS — Z01.818 OTHER SPECIFIED PRE-OPERATIVE EXAMINATION: ICD-10-CM

## 2021-03-08 PROCEDURE — U0004 COV-19 TEST NON-CDC HGH THRU: HCPCS

## 2021-03-08 PROCEDURE — U0005 INFEC AGEN DETEC AMPLI PROBE: HCPCS

## 2021-03-08 PROCEDURE — C9803 HOPD COVID-19 SPEC COLLECT: HCPCS

## 2021-03-09 LAB — SARS-COV-2 RNA RESP QL NAA+PROBE: NOT DETECTED

## 2021-03-10 ENCOUNTER — HOSPITAL ENCOUNTER (OUTPATIENT)
Facility: HOSPITAL | Age: 76
Setting detail: HOSPITAL OUTPATIENT SURGERY
Discharge: HOME | End: 2021-03-10
Attending: INTERNAL MEDICINE | Admitting: INTERNAL MEDICINE

## 2021-03-10 ENCOUNTER — ANESTHESIA EVENT (OUTPATIENT)
Dept: GASTROENTEROLOGY | Facility: HOSPITAL | Age: 76
End: 2021-03-10

## 2021-03-10 ENCOUNTER — ANESTHESIA (OUTPATIENT)
Dept: GASTROENTEROLOGY | Facility: HOSPITAL | Age: 76
End: 2021-03-10

## 2021-03-10 VITALS
OXYGEN SATURATION: 97 % | WEIGHT: 140 LBS | BODY MASS INDEX: 24.8 KG/M2 | DIASTOLIC BLOOD PRESSURE: 80 MMHG | SYSTOLIC BLOOD PRESSURE: 149 MMHG | RESPIRATION RATE: 16 BRPM | HEIGHT: 63 IN | HEART RATE: 71 BPM

## 2021-03-10 DIAGNOSIS — R13.10 DYSPHAGIA, UNSPECIFIED TYPE: ICD-10-CM

## 2021-03-10 DIAGNOSIS — K59.1 FUNCTIONAL DIARRHEA: ICD-10-CM

## 2021-03-10 PROCEDURE — 43239 EGD BIOPSY SINGLE/MULTIPLE: CPT | Performed by: INTERNAL MEDICINE

## 2021-03-10 PROCEDURE — 88305 TISSUE EXAM BY PATHOLOGIST: CPT | Performed by: INTERNAL MEDICINE

## 2021-03-10 PROCEDURE — 25010000002 PROPOFOL 10 MG/ML EMULSION: Performed by: NURSE ANESTHETIST, CERTIFIED REGISTERED

## 2021-03-10 PROCEDURE — 45380 COLONOSCOPY AND BIOPSY: CPT | Performed by: INTERNAL MEDICINE

## 2021-03-10 PROCEDURE — 45385 COLONOSCOPY W/LESION REMOVAL: CPT | Performed by: INTERNAL MEDICINE

## 2021-03-10 RX ORDER — PROPOFOL 10 MG/ML
VIAL (ML) INTRAVENOUS AS NEEDED
Status: DISCONTINUED | OUTPATIENT
Start: 2021-03-10 | End: 2021-03-10 | Stop reason: SURG

## 2021-03-10 RX ORDER — LIDOCAINE HYDROCHLORIDE 20 MG/ML
INJECTION, SOLUTION INFILTRATION; PERINEURAL AS NEEDED
Status: DISCONTINUED | OUTPATIENT
Start: 2021-03-10 | End: 2021-03-10 | Stop reason: SURG

## 2021-03-10 RX ORDER — SODIUM CHLORIDE, SODIUM LACTATE, POTASSIUM CHLORIDE, CALCIUM CHLORIDE 600; 310; 30; 20 MG/100ML; MG/100ML; MG/100ML; MG/100ML
30 INJECTION, SOLUTION INTRAVENOUS CONTINUOUS
Status: DISCONTINUED | OUTPATIENT
Start: 2021-03-10 | End: 2021-03-10 | Stop reason: HOSPADM

## 2021-03-10 RX ORDER — PROPOFOL 10 MG/ML
VIAL (ML) INTRAVENOUS CONTINUOUS PRN
Status: DISCONTINUED | OUTPATIENT
Start: 2021-03-10 | End: 2021-03-10 | Stop reason: SURG

## 2021-03-10 RX ADMIN — LIDOCAINE HYDROCHLORIDE 60 MG: 20 INJECTION, SOLUTION INFILTRATION; PERINEURAL at 13:38

## 2021-03-10 RX ADMIN — PROPOFOL 180 MCG/KG/MIN: 10 INJECTION, EMULSION INTRAVENOUS at 13:38

## 2021-03-10 RX ADMIN — PROPOFOL 130 MG: 10 INJECTION, EMULSION INTRAVENOUS at 13:38

## 2021-03-10 RX ADMIN — SODIUM CHLORIDE, POTASSIUM CHLORIDE, SODIUM LACTATE AND CALCIUM CHLORIDE 30 ML/HR: 600; 310; 30; 20 INJECTION, SOLUTION INTRAVENOUS at 13:21

## 2021-03-10 NOTE — H&P
[facility]  Pre Procedure History & Physical    Chief Complaint:   Time for my colonoscopy    Subjective     HPI:   Lili Ruiz is a 75 y.o. female who presents today for new patient evaluation.     She complains of diarrhea.  Symptoms long standing, dating back >10 years.  Initially very intermittent, worse over last few years.  She has had periods of fecal incontinence.  Denies blood/mucous in stool.  She is never had prior GI evaluation for the symptoms although she did have a negative colonoscopy in 2014 for routine colon cancer screening.  She recently presented to the ER in October for these symptoms.  She had a CT scan of the abdomen and pelvis which showed a paucity of formed stool but no evidence of obvious bowel wall thickening or colitis.  Still has since that time she started over-the-counter align and has had significant improvement in her symptoms.     Addition to her diarrhea she reports occasional sensation of a lump in her throat and food sticking she can localizes this to her sternal notch.  This is fairly infrequent and also somewhat longstanding.  She had a normal EGD at the time of her colonoscopy in 2014.     She has hx of Afib on Warfarin    Past Medical History:   Past Medical History:   Diagnosis Date   • Anxiety    • Arthritis    • Atrial fibrillation (CMS/HCC)    • Breast cancer (CMS/HCC)    • Cancer (CMS/HCC)     Breast   • Cataract     bilateral eyes   • Depression    • Diverticulosis    • Human metapneumovirus (hMPV) pneumonia    • Hyperlipidemia    • Hypertension    • Kidney stone    • Low back pain    • Obesity    • Peptic ulceration    • Visual impairment        Family History:  Family History   Problem Relation Age of Onset   • Cancer Mother    • Ovarian cancer Mother    • Heart disease Father    • Colon cancer Maternal Aunt        Social History:   reports that she has quit smoking. She has never used smokeless tobacco. She reports that she does not  "drink alcohol and does not use drugs.    Medications:   Medications Prior to Admission   Medication Sig Dispense Refill Last Dose   • flecainide (TAMBOCOR) 100 MG tablet Take 1 tablet by mouth Every 12 (Twelve) Hours. 60 tablet 3 3/10/2021 at Unknown time   • lisinopril (PRINIVIL,ZESTRIL) 40 MG tablet Take 1 tablet by mouth Daily. 90 tablet 1 3/10/2021 at Unknown time   • omeprazole (priLOSEC) 20 MG capsule Take 20 mg by mouth 2 (Two) Times a Day As Needed.   Past Month at Unknown time   • pravastatin (PRAVACHOL) 20 MG tablet Take 1 tablet by mouth Daily. 30 tablet 3 3/10/2021 at Unknown time   • traMADol (ULTRAM) 50 MG tablet Take 1 tablet by mouth Every 8 (Eight) Hours As Needed for Moderate Pain . 30 tablet 2 Past Week at Unknown time   • apixaban (ELIQUIS) 5 MG tablet tablet Take 5 mg by mouth 2 (Two) Times a Day.   3/7/2021   • Calcium Carbonate (CALCIUM 600 PO) Take  by mouth 2 (Two) Times a Day.   3/3/2021   • Cholecalciferol (VITAMIN D3) 2000 UNITS tablet Take 1 tablet by mouth Daily.   3/3/2021   • Probiotic Product (Align) capsule TAKE ONE CAPSULE BY MOUTH EVERY DAY 30 capsule 2 3/7/2021   • vitamin C (ASCORBIC ACID) 250 MG tablet Take 500 mg by mouth Daily.   3/3/2021       Allergies:  Penicillins, Hydrocodone-acetaminophen, and Sotalol hcl    ROS:    Pertinent items are noted in HPI     Objective     Blood pressure 139/70, pulse 79, resp. rate 14, height 160 cm (63\"), weight 63.5 kg (140 lb), SpO2 98 %.    Physical Exam   Constitutional: Pt is oriented to person, place, and time and well-developed, well-nourished, and in no distress.   HENT:   Mouth/Throat: Oropharynx is clear and moist.   Neck: Normal range of motion. Neck supple.   Cardiovascular: Normal rate, regular rhythm and normal heart sounds.    Pulmonary/Chest: Effort normal and breath sounds normal. No respiratory distress. No  wheezes.   Abdominal: Soft. Bowel sounds are normal.   Skin: Skin is warm and dry.   Psychiatric: Mood, memory, " affect and judgment normal.     Assessment/Plan     Diagnosis:  Diarrhea  Dyspespia    Anticipated Surgical Procedure:  EGD  Colonoscopy    The risks, benefits, and alternatives of this procedure have been discussed with the patient or the responsible party- the patient understands and agrees to proceed.

## 2021-03-10 NOTE — ANESTHESIA POSTPROCEDURE EVALUATION
"Patient: Lili Ruiz    Procedure Summary     Date: 03/10/21 Room / Location: [facility] ENDOSCOPY 10 /  [facility]    Anesthesia Start: 1330 Anesthesia Stop: 1416    Procedures:       ESOPHAGOGASTRODUODENOSCOPY WITH BIOPSY (N/A Esophagus)      COLONOSCOPY  TO CECUM WITH BIOPSY AND POLYPECTOMY (COLD SNARE) (N/A ) Diagnosis:       Dysphagia, unspecified type      Functional diarrhea      (Dysphagia, unspecified type [R13.10])      (Functional diarrhea [K59.1])    Surgeons: Joel Galan MD Provider: Magdy Sequeira MD    Anesthesia Type: MAC ASA Status: 3          Anesthesia Type: MAC    Vitals  Vitals Value Taken Time   /80 03/10/21 1425   Temp     Pulse 74 03/10/21 1425   Resp 16 03/10/21 1425   SpO2 99 % 03/10/21 1425           Post Anesthesia Care and Evaluation    Patient location during evaluation: bedside  Patient participation: complete - patient participated  Level of consciousness: awake and alert  Pain management: adequate  Airway patency: patent  Anesthetic complications: No anesthetic complications    Cardiovascular status: acceptable  Respiratory status: acceptable  Hydration status: acceptable    Comments: /80   Pulse 74   Resp 16   Ht 160 cm (63\")   Wt 63.5 kg (140 lb)   SpO2 99%   BMI 24.80 kg/m²       "

## 2021-03-10 NOTE — ANESTHESIA PREPROCEDURE EVALUATION
Anesthesia Evaluation     Patient summary reviewed and Nursing notes reviewed   no history of anesthetic complications:  NPO Solid Status: > 8 hours  NPO Liquid Status: > 8 hours           Airway   Mallampati: II  Dental      Pulmonary - normal exam   (+) pneumonia ,   Cardiovascular - normal exam    (+) hypertension less than 2 medications, dysrhythmias Atrial Fib, hyperlipidemia,  carotid artery disease      Neuro/Psych  (+) psychiatric history Anxiety and Depression,     GI/Hepatic/Renal/Endo    (+)  GERD, PUD,  renal disease stones,     Musculoskeletal     Abdominal    Substance History      OB/GYN          Other   arthritis,    history of cancer remission                    Anesthesia Plan    ASA 3     MAC     intravenous induction     Anesthetic plan, all risks, benefits, and alternatives have been provided, discussed and informed consent has been obtained with: patient.

## 2021-03-11 LAB
LAB AP CASE REPORT: NORMAL
PATH REPORT.FINAL DX SPEC: NORMAL
PATH REPORT.GROSS SPEC: NORMAL

## 2021-03-25 NOTE — PROGRESS NOTES
EGD Pathology Results:  Mild esophagitis, no Barretts    Recommendations:  Continue omeprazole    Colonoscopy Pathology Results:    Tubular adenomas: 2  Villous adenomas: 0  Sessile serrated polyps: 0  Traditional serrated adenomas: 0  Hyperplastic polyps: 1    Random colon biopsies showed no e/o microscopic colitis    Office f/u: 4-6 weeks with BG

## 2021-04-02 ENCOUNTER — TELEPHONE (OUTPATIENT)
Dept: GASTROENTEROLOGY | Facility: CLINIC | Age: 76
End: 2021-04-02

## 2021-04-02 NOTE — TELEPHONE ENCOUNTER
----- Message from Prashanth Meza sent at 4/2/2021  9:16 AM EDT -----  Contact: [phone]  Pt called about colonoscopy results [phone]

## 2021-04-02 NOTE — TELEPHONE ENCOUNTER
----- Message from Joel Galan MD sent at 3/25/2021  2:24 PM EDT -----  EGD Pathology Results:  Mild esophagitis, no Barretts    Recommendations:  Continue omeprazole    Colonoscopy Pathology Results:    Tubular adenomas: 2  Villous adenomas: 0  Sessile serrated polyps: 0  Traditional serrated adenomas: 0  Hyperplastic polyps: 1    Random colon biopsies showed no e/o microscopic colitis    Office f/u: 4-6 weeks with BG

## 2021-04-02 NOTE — TELEPHONE ENCOUNTER
Returned patient's phone call. Advised as per Dr. Galan's note. She verb understanding.   F/u appt with Malorie on 5/4/21@0745.

## 2021-05-04 ENCOUNTER — OFFICE VISIT (OUTPATIENT)
Dept: GASTROENTEROLOGY | Facility: CLINIC | Age: 76
End: 2021-05-04

## 2021-05-04 VITALS — TEMPERATURE: 98.3 F | BODY MASS INDEX: 39.65 KG/M2 | HEIGHT: 63 IN | WEIGHT: 223.8 LBS

## 2021-05-04 DIAGNOSIS — Z86.010 PERSONAL HISTORY OF COLONIC POLYPS: ICD-10-CM

## 2021-05-04 DIAGNOSIS — K22.2 SCHATZKI'S RING: ICD-10-CM

## 2021-05-04 DIAGNOSIS — K21.9 GASTROESOPHAGEAL REFLUX DISEASE, UNSPECIFIED WHETHER ESOPHAGITIS PRESENT: ICD-10-CM

## 2021-05-04 DIAGNOSIS — K58.0 IRRITABLE BOWEL SYNDROME WITH DIARRHEA: Primary | ICD-10-CM

## 2021-05-04 PROCEDURE — 99213 OFFICE O/P EST LOW 20 MIN: CPT | Performed by: NURSE PRACTITIONER

## 2021-05-04 NOTE — PROGRESS NOTES
Chief Complaint   Patient presents with   • Diarrhea     HPI    Lili Ruiz is a  76 y.o. female here for a follow up visit for diarrhea and dysphagia status post endoscopic evaluation with Dr. Galan new to me. She has had longstanding history of diarrhea dating back greater than 10 years.    I reviewed endoscopic evaluation dated 03/10/2021 as follows:    EGD with nonobstructing Schatzki's ring and a nodule found in the stomach.  Colonoscopy with normal ileum, polyps, nonbleeding internal hemorrhoids.  Pathology showed mild esophagitis and no Murguia's. Polyps were a mixture of hyperplastic and tubular adenoma. Random colon biopsies did not show evidence of microscopic colitis.    On visit today he tells me her bowel pattern has gone back to normal partially attributed to daily probiotics and bowel cleanse for colonoscopy.  She is passing 2 formed stools a day.  No fecal urgency, fecal incontinence, rectal bleeding, abdominal pain, rectal pain.  She is on align probiotics.    She started back on PPI therapy after the EGD and has had resolution of esophageal dysphagia and dyspepsia.  No nausea, vomiting, poor appetite, or weight loss.  Her current BMI is 39.64.    Past Medical History:   Diagnosis Date   • Anxiety    • Arthritis    • Atrial fibrillation (CMS/HCC)    • Breast cancer (CMS/HCC)    • Cancer (CMS/HCC)     Breast   • Cataract     bilateral eyes   • Depression    • Diverticulosis    • Human metapneumovirus (hMPV) pneumonia    • Hyperlipidemia    • Hypertension    • Kidney stone    • Low back pain    • Obesity    • Peptic ulceration    • Visual impairment        Past Surgical History:   Procedure Laterality Date   • APPENDECTOMY     • BREAST BIOPSY     • BREAST SURGERY     • CHOLECYSTECTOMY     • COLONOSCOPY  appox 2014   • COLONOSCOPY W/ POLYPECTOMY N/A 3/10/2021    Procedure: COLONOSCOPY  TO CECUM WITH BIOPSY AND POLYPECTOMY (COLD SNARE);  Surgeon: Joel Galan MD;  Location: [facility]  ENDOSCOPY;  Service: Gastroenterology;  Laterality: N/A;   DIARRHEA  POST: COLON POLYPS , HEMORRHOIDS  (COLD SNARE SUPPLY SAMPLE USED)   • ENDOSCOPY N/A 3/10/2021    Procedure: ESOPHAGOGASTRODUODENOSCOPY WITH BIOPSY;  Surgeon: Joel Galan MD;  Location: [facility] ENDOSCOPY;  Service: Gastroenterology;  Laterality: N/A;  DYSPHAGIA; DIARRHEA  POST: SCHIATZKI'S RING; GASTRIC NODULE   • GASTRIC BYPASS     • MASTECTOMY         Scheduled Meds:  Outpatient Encounter Medications as of 5/4/2021   Medication Sig Dispense Refill   • apixaban (ELIQUIS) 5 MG tablet tablet Take 5 mg by mouth 2 (Two) Times a Day.     • Calcium Carbonate (CALCIUM 600 PO) Take  by mouth 2 (Two) Times a Day.     • Cholecalciferol (VITAMIN D3) 2000 UNITS tablet Take 1 tablet by mouth Daily.     • flecainide (TAMBOCOR) 100 MG tablet Take 1 tablet by mouth Every 12 (Twelve) Hours. 60 tablet 3   • lisinopril (PRINIVIL,ZESTRIL) 40 MG tablet Take 1 tablet by mouth Daily. 90 tablet 1   • omeprazole (priLOSEC) 20 MG capsule Take 20 mg by mouth 2 (Two) Times a Day As Needed.     • pravastatin (PRAVACHOL) 20 MG tablet Take 1 tablet by mouth Daily. 30 tablet 3   • Probiotic Product (Align) capsule TAKE ONE CAPSULE BY MOUTH EVERY DAY 30 capsule 2   • traMADol (ULTRAM) 50 MG tablet Take 1 tablet by mouth Every 8 (Eight) Hours As Needed for Moderate Pain . 30 tablet 2   • vitamin C (ASCORBIC ACID) 250 MG tablet Take 500 mg by mouth Daily.       No facility-administered encounter medications on file as of 5/4/2021.       Continuous Infusions:No current facility-administered medications for this visit.      PRN Meds:.    Allergies   Allergen Reactions   • Penicillins Shortness Of Breath and Itching   • Hydrocodone-Acetaminophen    • Sotalol Hcl        Social History     Socioeconomic History   • Marital status:      Spouse name: Not on file   • Number of children: Not on file   • Years of education: Not on file   • Highest education level: Not on file    Tobacco Use   • Smoking status: Former Smoker   • Smokeless tobacco: Never Used   • Tobacco comment: 50 yrs ago   Substance and Sexual Activity   • Alcohol use: No   • Drug use: Never       Family History   Problem Relation Age of Onset   • Cancer Mother    • Ovarian cancer Mother    • Heart disease Father    • Colon cancer Maternal Aunt        Review of Systems   Constitutional: Negative for activity change, appetite change, fatigue, fever and unexpected weight change.   HENT: Negative for trouble swallowing.    Respiratory: Negative for apnea, cough, choking, chest tightness, shortness of breath and wheezing.    Cardiovascular: Negative for chest pain, palpitations and leg swelling.   Gastrointestinal: Negative for abdominal distention, abdominal pain, anal bleeding, blood in stool, constipation, diarrhea, nausea, rectal pain and vomiting.       Vitals:    05/04/21 1440   Temp: 98.3 °F (36.8 °C)       Physical Exam  Constitutional:       Appearance: She is well-developed.   Cardiovascular:      Rate and Rhythm: Normal rate and regular rhythm.      Heart sounds: Normal heart sounds.   Pulmonary:      Effort: Pulmonary effort is normal. No respiratory distress.      Breath sounds: Normal breath sounds. No wheezing.   Abdominal:      General: Bowel sounds are normal. There is no distension.      Palpations: Abdomen is soft. There is no mass.      Tenderness: There is no abdominal tenderness. There is no guarding.      Hernia: No hernia is present.   Neurological:      Mental Status: She is alert and oriented to person, place, and time.   Psychiatric:         Behavior: Behavior normal.       Assessment    Irritable bowel syndrome diarrhea predominant  GERD  Schatzki's ring  Tubular adenomatous colon polyps    Plan    Pleasant 76-year-old female seen today in follow-up status post endoscopic evaluation.  I reviewed procedural findings as well as pathology recommendations for follow-up in the future.  She done  remarkably well on probiotics and feels like her bowel pattern is improved with recent bowel prep.  We discussed the benefits of probiotics long-term and I recommend she rotate probiotics every 6 months to maximize efficacy.  Consider treatment Xifaxan the future if warranted.    Continue low-dose OTC PPI therapy to manage GERD symptoms and prevent return of esophageal dysphagia.    Patient to follow-up back in the office in 6 months or sooner if needed.

## 2021-06-29 ENCOUNTER — TRANSCRIBE ORDERS (OUTPATIENT)
Dept: ADMINISTRATIVE | Facility: HOSPITAL | Age: 76
End: 2021-06-29

## 2021-06-29 DIAGNOSIS — Z12.39 SCREENING BREAST EXAMINATION: Primary | ICD-10-CM

## 2021-08-02 RX ORDER — LISINOPRIL 40 MG/1
40 TABLET ORAL DAILY
Qty: 90 TABLET | Refills: 1 | Status: SHIPPED | OUTPATIENT
Start: 2021-08-02 | End: 2021-12-07 | Stop reason: SDUPTHER

## 2021-08-27 ENCOUNTER — HOSPITAL ENCOUNTER (OUTPATIENT)
Dept: MAMMOGRAPHY | Facility: HOSPITAL | Age: 76
Discharge: HOME | End: 2021-08-27
Admitting: INTERNAL MEDICINE

## 2021-08-27 DIAGNOSIS — Z12.39 SCREENING BREAST EXAMINATION: ICD-10-CM

## 2021-08-27 PROCEDURE — 77067 SCR MAMMO BI INCL CAD: CPT

## 2021-08-27 PROCEDURE — 77063 BREAST TOMOSYNTHESIS BI: CPT

## 2021-11-11 ENCOUNTER — OFFICE VISIT (OUTPATIENT)
Dept: GASTROENTEROLOGY | Facility: CLINIC | Age: 76
End: 2021-11-11

## 2021-11-11 VITALS — HEIGHT: 63 IN | TEMPERATURE: 97.2 F | BODY MASS INDEX: 40.79 KG/M2 | WEIGHT: 230.2 LBS

## 2021-11-11 DIAGNOSIS — Z86.010 PERSONAL HISTORY OF COLONIC POLYPS: ICD-10-CM

## 2021-11-11 DIAGNOSIS — K21.9 GASTROESOPHAGEAL REFLUX DISEASE, UNSPECIFIED WHETHER ESOPHAGITIS PRESENT: ICD-10-CM

## 2021-11-11 DIAGNOSIS — K58.0 IRRITABLE BOWEL SYNDROME WITH DIARRHEA: Primary | ICD-10-CM

## 2021-11-11 PROCEDURE — 99213 OFFICE O/P EST LOW 20 MIN: CPT | Performed by: NURSE PRACTITIONER

## 2021-11-11 NOTE — PROGRESS NOTES
Chief Complaint   Patient presents with   • Irritable bowel syndrome with diarrhea       HPI    Lili Ruiz is a  76 y.o. female here for a follow up visit for IBS-diarrhea.     Patient follows with Dr. Galan and myself.     She has had longstanding history of diarrhea dating back greater than 10 years.     I reviewed endoscopic evaluation dated 03/10/2021 as follows:     EGD with nonobstructing Schatzki's ring and a nodule found in the stomach.  Colonoscopy with normal ileum, polyps, nonbleeding internal hemorrhoids.  Pathology showed mild esophagitis and no Murguia's. Polyps were a mixture of hyperplastic and tubular adenoma. Random colon biopsies did not show evidence of microscopic colitis.    On visit today patient reports probiotics have been life-changing.  She is having rare episodes of diarrhea with specific dietary triggers such as Cronin's biscuit or broccoli.  This happens about once a week otherwise she is passing formed stools.  Denies abdominal pain, rectal pain, rectal bleeding only.    No upper GI complaints.  Stable heartburn on once daily PPI therapy.     Past Medical History:   Diagnosis Date   • Anxiety    • Aortic valve insufficiency    • Arthritis    • Atrial fibrillation (HCC)    • Breast cancer (HCC)    • Cancer (HCC)     Breast   • Cataract     bilateral eyes   • Depression    • Diverticulosis    • Human metapneumovirus (hMPV) pneumonia    • Hyperlipidemia    • Hypertension    • Kidney stone    • Low back pain    • Obesity    • Peptic ulceration    • Visual impairment        Past Surgical History:   Procedure Laterality Date   • APPENDECTOMY     • BREAST BIOPSY     • BREAST SURGERY     • CHOLECYSTECTOMY     • COLONOSCOPY  appox 2014   • COLONOSCOPY W/ POLYPECTOMY N/A 3/10/2021    Procedure: COLONOSCOPY  TO CECUM WITH BIOPSY AND POLYPECTOMY (COLD SNARE);  Surgeon: Joel Galan MD;  Location: [facility] ENDOSCOPY;  Service: Gastroenterology;  Laterality: N/A;   DIARRHEA  POST:  COLON POLYPS , HEMORRHOIDS  (COLD SNARE SUPPLY SAMPLE USED)   • ENDOSCOPY N/A 3/10/2021    Procedure: ESOPHAGOGASTRODUODENOSCOPY WITH BIOPSY;  Surgeon: Joel Galan MD;  Location: [facility] ENDOSCOPY;  Service: Gastroenterology;  Laterality: N/A;  DYSPHAGIA; DIARRHEA  POST: SCHIATZKI'S RING; GASTRIC NODULE   • GASTRIC BYPASS     • MASTECTOMY         Scheduled Meds:     Continuous Infusions: No current facility-administered medications for this visit.      PRN Meds:     Allergies   Allergen Reactions   • Penicillins Shortness Of Breath and Itching   • Hydrocodone-Acetaminophen    • Sotalol Hcl        Social History     Socioeconomic History   • Marital status:    Tobacco Use   • Smoking status: Former Smoker   • Smokeless tobacco: Never Used   • Tobacco comment: 50 yrs ago   Substance and Sexual Activity   • Alcohol use: No   • Drug use: Never       Family History   Problem Relation Age of Onset   • Cancer Mother    • Ovarian cancer Mother    • Heart disease Father    • Colon cancer Maternal Aunt        Review of Systems   Constitutional: Negative for activity change, appetite change, fatigue, fever and unexpected weight change.   HENT: Negative for trouble swallowing.    Respiratory: Negative for apnea, cough, choking, chest tightness, shortness of breath and wheezing.    Cardiovascular: Negative for chest pain, palpitations and leg swelling.   Gastrointestinal: Positive for diarrhea. Negative for abdominal distention, abdominal pain, anal bleeding, blood in stool, constipation, nausea, rectal pain and vomiting.       Vitals:    11/11/21 0940   Temp: 97.2 °F (36.2 °C)       Physical Exam  Constitutional:       Appearance: She is well-developed.   Abdominal:      General: Bowel sounds are normal. There is no distension.      Palpations: Abdomen is soft. There is no mass.      Tenderness: There is no abdominal tenderness. There is no guarding.      Hernia: No hernia is present.   Skin:     General: Skin  is warm and dry.      Capillary Refill: Capillary refill takes less than 2 seconds.   Neurological:      Mental Status: She is alert and oriented to person, place, and time.   Psychiatric:         Behavior: Behavior normal.     Assessment    Diagnoses and all orders for this visit:    1. Irritable bowel syndrome with diarrhea (Primary)    2. Gastroesophageal reflux disease, unspecified whether esophagitis present    3. Personal history of colonic polyps       Plan    Stable IBS-D, continue probiotics.  Discussed the benefits of IBgard as well and samples were provided.  Stable GERD, continue PPI therapy.  Patient has a personal history of colon polyps but is up-to-date in terms of colon cancer screening.  Return to clinic 6 months.         SHANON Alonso  [facility]  [address] [place] [address]  Office: [phone]     Drysol Pregnancy And Lactation Text: This medication is considered safe during pregnancy and breast feeding.

## 2021-12-07 ENCOUNTER — OFFICE VISIT (OUTPATIENT)
Dept: FAMILY MEDICINE CLINIC | Facility: CLINIC | Age: 76
End: 2021-12-07

## 2021-12-07 VITALS
BODY MASS INDEX: 40.89 KG/M2 | DIASTOLIC BLOOD PRESSURE: 88 MMHG | OXYGEN SATURATION: 99 % | TEMPERATURE: 97.3 F | HEIGHT: 63 IN | WEIGHT: 230.8 LBS | SYSTOLIC BLOOD PRESSURE: 140 MMHG | RESPIRATION RATE: 15 BRPM | HEART RATE: 65 BPM

## 2021-12-07 DIAGNOSIS — I48.0 PAROXYSMAL ATRIAL FIBRILLATION (HCC): ICD-10-CM

## 2021-12-07 DIAGNOSIS — I10 ESSENTIAL HYPERTENSION: ICD-10-CM

## 2021-12-07 DIAGNOSIS — E55.9 VITAMIN D DEFICIENCY: ICD-10-CM

## 2021-12-07 DIAGNOSIS — Z00.00 MEDICARE ANNUAL WELLNESS VISIT, SUBSEQUENT: Primary | ICD-10-CM

## 2021-12-07 DIAGNOSIS — E78.00 PURE HYPERCHOLESTEROLEMIA: ICD-10-CM

## 2021-12-07 PROBLEM — I35.1 AORTIC INSUFFICIENCY: Status: ACTIVE | Noted: 2021-12-07

## 2021-12-07 LAB
25(OH)D3 SERPL-MCNC: 48 NG/ML (ref 30–100)
ALBUMIN SERPL-MCNC: 4.5 G/DL (ref 3.5–5.2)
ALBUMIN/GLOB SERPL: 1.8 G/DL
ALP SERPL-CCNC: 54 U/L (ref 39–117)
ALT SERPL W P-5'-P-CCNC: 13 U/L (ref 1–33)
ANION GAP SERPL CALCULATED.3IONS-SCNC: 11.9 MMOL/L (ref 5–15)
AST SERPL-CCNC: 15 U/L (ref 1–32)
BILIRUB SERPL-MCNC: 0.5 MG/DL (ref 0–1.2)
BUN SERPL-MCNC: 15 MG/DL (ref 8–23)
BUN/CREAT SERPL: 25 (ref 7–25)
CALCIUM SPEC-SCNC: 9.9 MG/DL (ref 8.6–10.5)
CHLORIDE SERPL-SCNC: 102 MMOL/L (ref 98–107)
CHOLEST SERPL-MCNC: 191 MG/DL (ref 0–200)
CO2 SERPL-SCNC: 27.1 MMOL/L (ref 22–29)
CREAT SERPL-MCNC: 0.6 MG/DL (ref 0.57–1)
DEPRECATED RDW RBC AUTO: 41.6 FL (ref 37–54)
ERYTHROCYTE [DISTWIDTH] IN BLOOD BY AUTOMATED COUNT: 12 % (ref 12.3–15.4)
GFR SERPL CREATININE-BSD FRML MDRD: 98 ML/MIN/1.73
GLOBULIN UR ELPH-MCNC: 2.5 GM/DL
GLUCOSE SERPL-MCNC: 84 MG/DL (ref 65–99)
HCT VFR BLD AUTO: 43.9 % (ref 34–46.6)
HDLC SERPL-MCNC: 72 MG/DL (ref 40–60)
HGB BLD-MCNC: 13.9 G/DL (ref 12–15.9)
LDLC SERPL CALC-MCNC: 105 MG/DL (ref 0–100)
LDLC/HDLC SERPL: 1.45 {RATIO}
MCH RBC QN AUTO: 29.9 PG (ref 26.6–33)
MCHC RBC AUTO-ENTMCNC: 31.7 G/DL (ref 31.5–35.7)
MCV RBC AUTO: 94.4 FL (ref 79–97)
PLATELET # BLD AUTO: 235 10*3/MM3 (ref 140–450)
PMV BLD AUTO: 11 FL (ref 6–12)
POTASSIUM SERPL-SCNC: 4.1 MMOL/L (ref 3.5–5.2)
PROT SERPL-MCNC: 7 G/DL (ref 6–8.5)
RBC # BLD AUTO: 4.65 10*6/MM3 (ref 3.77–5.28)
SODIUM SERPL-SCNC: 141 MMOL/L (ref 136–145)
TRIGL SERPL-MCNC: 74 MG/DL (ref 0–150)
VLDLC SERPL-MCNC: 14 MG/DL (ref 5–40)
WBC NRBC COR # BLD: 6.02 10*3/MM3 (ref 3.4–10.8)

## 2021-12-07 PROCEDURE — 85027 COMPLETE CBC AUTOMATED: CPT | Performed by: INTERNAL MEDICINE

## 2021-12-07 PROCEDURE — 80061 LIPID PANEL: CPT | Performed by: INTERNAL MEDICINE

## 2021-12-07 PROCEDURE — 80053 COMPREHEN METABOLIC PANEL: CPT | Performed by: INTERNAL MEDICINE

## 2021-12-07 PROCEDURE — G0439 PPPS, SUBSEQ VISIT: HCPCS | Performed by: INTERNAL MEDICINE

## 2021-12-07 PROCEDURE — 82306 VITAMIN D 25 HYDROXY: CPT | Performed by: INTERNAL MEDICINE

## 2021-12-07 PROCEDURE — 99214 OFFICE O/P EST MOD 30 MIN: CPT | Performed by: INTERNAL MEDICINE

## 2021-12-07 PROCEDURE — 36415 COLL VENOUS BLD VENIPUNCTURE: CPT | Performed by: INTERNAL MEDICINE

## 2021-12-07 RX ORDER — TRAMADOL HYDROCHLORIDE 50 MG/1
50 TABLET ORAL EVERY 8 HOURS PRN
Qty: 30 TABLET | Refills: 2 | Status: SHIPPED | OUTPATIENT
Start: 2021-12-07 | End: 2022-06-08 | Stop reason: SDUPTHER

## 2021-12-07 RX ORDER — LISINOPRIL 40 MG/1
40 TABLET ORAL DAILY
Qty: 90 TABLET | Refills: 3 | Status: SHIPPED | OUTPATIENT
Start: 2021-12-07 | End: 2022-01-28 | Stop reason: SDUPTHER

## 2021-12-07 NOTE — PROGRESS NOTES
QUICK REFERENCE INFORMATION:  The ABCs of the Annual Wellness Visit    Subsequent Medicare Wellness Visit patient was seen for Medicare wellness exam.  Patient was seen for paroxysmal atrial fibrillation.  Patient is using apixaban 5 mg twice daily for clot control and flecainide 100 mg every 12 for rate control.  Patient is stable with this treatment.  Patient blood pressures been running 140s over 80s.  Patient is continuing lisinopril 40 mg daily.  Patient's lipids treated with diet exercise and Pravachol 20 mg daily.  Triglycerides were 50, HDL 54, LDL 85.  Patient will continue present treatment.  Patient will continue monitoring blood pressure and follow-up in 6 months.    Dictated utilizing Dragon dictation. If there are questions or for further clarification, please contact me.    HEALTH RISK ASSESSMENT    1945    Recent Hospitalizations:  No hospitalization(s) within the last year..        Current Medical Providers:  Patient Care Team:  Luis Davis MD as PCP - General  Luis Davis MD as PCP - Family Medicine  Shamar Caban MD as Consulting Physician (Medical Oncology)  Lee Heredia MD as Surgeon (General Surgery)  Khris Lewis II, MD as Consulting Physician (Interventional Cardiology)        Smoking Status:  Social History     Tobacco Use   Smoking Status Former Smoker   Smokeless Tobacco Never Used   Tobacco Comment    50 yrs ago       Alcohol Consumption:  Social History     Substance and Sexual Activity   Alcohol Use No       Depression Screen:   PHQ-2/PHQ-9 Depression Screening 12/7/2021   Little interest or pleasure in doing things 0   Feeling down, depressed, or hopeless 0   Trouble falling or staying asleep, or sleeping too much 0   Feeling tired or having little energy 0   Poor appetite or overeating 0   Feeling bad about yourself - or that you are a failure or have let yourself or your family down 0   Trouble concentrating on things, such as reading the newspaper or  watching television 0   Moving or speaking so slowly that other people could have noticed. Or the opposite - being so fidgety or restless that you have been moving around a lot more than usual 0   Thoughts that you would be better off dead, or of hurting yourself in some way 0   Total Score 0   If you checked off any problems, how difficult have these problems made it for you to do your work, take care of things at home, or get along with other people? Not difficult at all       Health Habits and Functional and Cognitive Screening:  Functional & Cognitive Status 12/7/2021   Do you have difficulty preparing food and eating? No   Do you have difficulty bathing yourself, getting dressed or grooming yourself? No   Do you have difficulty using the toilet? No   Do you have difficulty moving around from place to place? No   Do you have trouble with steps or getting out of a bed or a chair? No   Current Diet Unhealthy Diet   Dental Exam Up to date   Eye Exam Up to date   Exercise (times per week) 0 times per week   Current Exercises Include No Regular Exercise   Current Exercise Activities Include -   Do you need help using the phone?  No   Are you deaf or do you have serious difficulty hearing?  No   Do you need help with transportation? No   Do you need help shopping? No   Do you need help preparing meals?  No   Do you need help with housework?  No   Do you need help with laundry? No   Do you need help taking your medications? No   Do you need help managing money? No   Do you ever drive or ride in a car without wearing a seat belt? No   Have you felt unusual stress, anger or loneliness in the last month? No   Who do you live with? Spouse   If you need help, do you have trouble finding someone available to you? No   Have you been bothered in the last four weeks by sexual problems? No   Do you have difficulty concentrating, remembering or making decisions? No           Does the patient have evidence of cognitive impairment?  No    Aspirin use counseling: Does not need ASA (and currently is not on it)      Recent Lab Results:  CMP:  Lab Results   Component Value Date    GLU 80 03/31/2019    BUN 15 10/28/2020    CREATININE 0.71 10/28/2020    EGFRIFNONA 81 10/28/2020    BCR 21.1 10/28/2020     10/28/2020    K 4.1 10/28/2020    CO2 28.4 10/28/2020    CALCIUM 10.3 10/28/2020    ALBUMIN 4.70 10/28/2020    LABIL2 1.5 03/31/2019    BILITOT 0.7 10/28/2020    ALKPHOS 69 10/28/2020    AST 19 10/28/2020    ALT 19 10/28/2020     Lipid Panel:  Lab Results   Component Value Date    CHOL 174 10/23/2019    TRIG 46 10/23/2019    HDL 79 (H) 10/23/2019    VLDL 9.2 10/23/2019    LDLHDL 1.09 10/23/2019     HbA1c:       Visual Acuity:  No exam data present    Age-appropriate Screening Schedule:  Refer to the list below for future screening recommendations based on patient's age, sex and/or medical conditions. Orders for these recommended tests are listed in the plan section. The patient has been provided with a written plan.    Health Maintenance   Topic Date Due   • ZOSTER VACCINE (2 of 2) 03/14/2017   • DXA SCAN  12/13/2020   • LIPID PANEL  05/21/2022   • MAMMOGRAM  08/27/2022   • TDAP/TD VACCINES (2 - Td or Tdap) 01/17/2027   • INFLUENZA VACCINE  Completed        Subjective   History of Present Illness    Lili Ruiz is a 76 y.o. female who presents for an Subsequent Wellness Visit.    The following portions of the patient's history were reviewed and updated as appropriate: allergies, current medications, past family history, past medical history, past social history, past surgical history and problem list.    Outpatient Medications Prior to Visit   Medication Sig Dispense Refill   • apixaban (ELIQUIS) 5 MG tablet tablet Take 5 mg by mouth 2 (Two) Times a Day.     • Calcium Carbonate (CALCIUM 600 PO) Take  by mouth 2 (Two) Times a Day.     • Cholecalciferol (VITAMIN D3) 2000 UNITS tablet Take 1 tablet by mouth Daily.     • flecainide (TAMBOCOR)  100 MG tablet Take 1 tablet by mouth Every 12 (Twelve) Hours. 60 tablet 3   • omeprazole (priLOSEC) 20 MG capsule Take 20 mg by mouth Daily.     • pravastatin (PRAVACHOL) 20 MG tablet Take 1 tablet by mouth Daily. 30 tablet 3   • Probiotic Product (Align) capsule TAKE ONE CAPSULE BY MOUTH EVERY DAY 30 capsule 2   • Unable to find 1 each 1 (One) Time. IB Valerio 2 Tables Daily     • vitamin C (ASCORBIC ACID) 250 MG tablet Take 500 mg by mouth Daily.     • lisinopril (PRINIVIL,ZESTRIL) 40 MG tablet TAKE 1 TABLET BY MOUTH DAILY 90 tablet 1   • traMADol (ULTRAM) 50 MG tablet Take 1 tablet by mouth Every 8 (Eight) Hours As Needed for Moderate Pain . 30 tablet 2     No facility-administered medications prior to visit.       Patient Active Problem List   Diagnosis   • Visual impairment   • Peptic ulceration   • Low back pain   • Kidney stone   • Hypertension   • Hyperlipidemia   • Diverticulosis   • Depression   • Cancer (HCC)   • Atrial fibrillation (HCC)   • Arthritis   • Anxiety   • Human metapneumovirus (hMPV) pneumonia   • Paroxysmal supraventricular tachycardia (HCC)   • Esophageal reflux   • Ductal carcinoma in situ (DCIS) of right breast   • Carotid artery stenosis   • Family history of malignant neoplasm of gastrointestinal tract   • Family history of malignant neoplasm of genital organ, other   • Mitral annular calcification   • Chronic anticoagulation   • Dysphagia   • Functional diarrhea   • Aortic insufficiency       Advance Care Planning:  ACP discussion was held with the patient during this visit. Patient has an advance directive in EMR which is still valid.     Identification of Risk Factors:  Risk factors include: NA.    Review of Systems   Constitutional: Negative for fatigue and fever.   HENT: Positive for congestion. Negative for trouble swallowing.    Eyes: Negative for discharge and visual disturbance.   Respiratory: Negative for choking and shortness of breath.    Cardiovascular: Negative for chest pain  and palpitations.   Gastrointestinal: Negative for abdominal pain and blood in stool.   Endocrine: Negative.    Genitourinary: Negative for genital sores and hematuria.   Musculoskeletal: Negative for gait problem and joint swelling.   Skin: Negative for color change, pallor, rash and wound.   Allergic/Immunologic: Positive for environmental allergies. Negative for immunocompromised state.   Neurological: Negative for facial asymmetry and speech difficulty.   Psychiatric/Behavioral: Negative for hallucinations and suicidal ideas.       Compared to one year ago, the patient feels her physical health is the same.  Compared to one year ago, the patient feels her mental health is the same.    Objective     Physical Exam  Vitals and nursing note reviewed.   Constitutional:       Appearance: Normal appearance. She is well-developed.   HENT:      Head: Normocephalic and atraumatic.      Nose: Nose normal.      Mouth/Throat:      Mouth: Mucous membranes are moist.      Pharynx: Oropharynx is clear.   Eyes:      Extraocular Movements: Extraocular movements intact.      Conjunctiva/sclera: Conjunctivae normal.      Pupils: Pupils are equal, round, and reactive to light.   Cardiovascular:      Rate and Rhythm: Normal rate. Rhythm irregular.      Heart sounds: Normal heart sounds. No murmur heard.  No friction rub. No gallop.    Pulmonary:      Effort: Pulmonary effort is normal. No respiratory distress.      Breath sounds: Normal breath sounds. No stridor. No wheezing, rhonchi or rales.   Chest:      Chest wall: No tenderness.   Abdominal:      General: Bowel sounds are normal.      Palpations: Abdomen is soft.   Musculoskeletal:         General: Normal range of motion.      Cervical back: Normal range of motion and neck supple.   Skin:     General: Skin is warm and dry.   Neurological:      General: No focal deficit present.      Mental Status: She is alert and oriented to person, place, and time. Mental status is at  "baseline.   Psychiatric:         Mood and Affect: Mood normal.         Behavior: Behavior normal.         Thought Content: Thought content normal.         Judgment: Judgment normal.         Vitals:    12/07/21 1347   BP: 140/88   BP Location: Left arm   Patient Position: Sitting   Cuff Size: Adult   Pulse: 65   Resp: 15   Temp: 97.3 °F (36.3 °C)   TempSrc: Infrared   SpO2: 99%   Weight: 105 kg (230 lb 12.8 oz)   Height: 160 cm (62.99\")   PainSc: 0-No pain       Patient's Body mass index is 40.89 kg/m². indicating that she is overweight (BMI 25-29.9). Obesity-related health conditions include the following: hypertension and dyslipidemias. Obesity is improving with treatment. BMI is is above average; BMI management plan is completed. We discussed portion control and increasing exercise..      Assessment/Plan #1 continue monitoring blood pressure #2 follow-up with cardiology #3 labs  Patient Self-Management and Personalized Health Advice  The patient has been provided with information about: NA and preventive services including:   · NA.    Visit Diagnoses:    ICD-10-CM ICD-9-CM   1. Medicare annual wellness visit, subsequent  Z00.00 V70.0   2. Paroxysmal atrial fibrillation (HCC)  I48.0 427.31   3. Essential hypertension  I10 401.9   4. Pure hypercholesterolemia  E78.00 272.0   5. Vitamin D deficiency  E55.9 268.9       Orders Placed This Encounter   Procedures   • CBC (No Diff)     Standing Status:   Future     Number of Occurrences:   1     Standing Expiration Date:   12/7/2022     Order Specific Question:   Release to patient     Answer:   Immediate   • Comprehensive Metabolic Panel     Order Specific Question:   Release to patient     Answer:   Immediate   • Lipid Panel   • Vitamin D 25 Hydroxy     Order Specific Question:   Release to patient     Answer:   Immediate       Outpatient Encounter Medications as of 12/7/2021   Medication Sig Dispense Refill   • apixaban (ELIQUIS) 5 MG tablet tablet Take 5 mg by mouth 2 " (Two) Times a Day.     • Calcium Carbonate (CALCIUM 600 PO) Take  by mouth 2 (Two) Times a Day.     • Cholecalciferol (VITAMIN D3) 2000 UNITS tablet Take 1 tablet by mouth Daily.     • flecainide (TAMBOCOR) 100 MG tablet Take 1 tablet by mouth Every 12 (Twelve) Hours. 60 tablet 3   • omeprazole (priLOSEC) 20 MG capsule Take 20 mg by mouth Daily.     • pravastatin (PRAVACHOL) 20 MG tablet Take 1 tablet by mouth Daily. 30 tablet 3   • Probiotic Product (Align) capsule TAKE ONE CAPSULE BY MOUTH EVERY DAY 30 capsule 2   • Unable to find 1 each 1 (One) Time. IB Valerio 2 Tables Daily     • vitamin C (ASCORBIC ACID) 250 MG tablet Take 500 mg by mouth Daily.     • [DISCONTINUED] lisinopril (PRINIVIL,ZESTRIL) 40 MG tablet TAKE 1 TABLET BY MOUTH DAILY 90 tablet 1   • [DISCONTINUED] traMADol (ULTRAM) 50 MG tablet Take 1 tablet by mouth Every 8 (Eight) Hours As Needed for Moderate Pain . 30 tablet 2   • lisinopril (PRINIVIL,ZESTRIL) 40 MG tablet Take 1 tablet by mouth Daily. 90 tablet 3   • traMADol (ULTRAM) 50 MG tablet Take 1 tablet by mouth Every 8 (Eight) Hours As Needed for Moderate Pain . 30 tablet 2     No facility-administered encounter medications on file as of 12/7/2021.       Reviewed use of high risk medication in the elderly: not applicable  Reviewed for potential of harmful drug interactions in the elderly: not applicable    Follow Up:  Return in about 6 months (around 6/7/2022), or if symptoms worsen or fail to improve, for Recheck.     An After Visit Summary and PPPS with all of these plans were given to the patient.

## 2022-01-28 RX ORDER — LISINOPRIL 40 MG/1
40 TABLET ORAL DAILY
Qty: 90 TABLET | Refills: 3 | Status: SHIPPED | OUTPATIENT
Start: 2022-01-28 | End: 2023-02-02

## 2022-01-28 NOTE — TELEPHONE ENCOUNTER
Caller: Lili Ruiz LETHA    Relationship: Self    Best call back number:   Lili Ruiz (Self) [phone] (H)         Requested Prescriptions:   Requested Prescriptions     Pending Prescriptions Disp Refills   • lisinopril (PRINIVIL,ZESTRIL) 40 MG tablet 90 tablet 3     Sig: Take 1 tablet by mouth Daily.        Pharmacy where request should be sent: [id number] [address] AT [facility] [phone] [facility] [phone] FX     Additional details provided by patient:     Does the patient have less than a 3 day supply:  [] Yes  [x] No    Prashanth eNgrete Rep   01/28/22 09:41 EST

## 2022-03-01 ENCOUNTER — HOSPITAL ENCOUNTER (EMERGENCY)
Facility: HOSPITAL | Age: 77
Discharge: HOME | End: 2022-03-01
Attending: EMERGENCY MEDICINE | Admitting: EMERGENCY MEDICINE

## 2022-03-01 ENCOUNTER — APPOINTMENT (OUTPATIENT)
Dept: GENERAL RADIOLOGY | Facility: HOSPITAL | Age: 77
End: 2022-03-01

## 2022-03-01 VITALS
RESPIRATION RATE: 20 BRPM | DIASTOLIC BLOOD PRESSURE: 71 MMHG | SYSTOLIC BLOOD PRESSURE: 137 MMHG | OXYGEN SATURATION: 95 % | TEMPERATURE: 98.7 F | HEART RATE: 78 BPM

## 2022-03-01 DIAGNOSIS — S83.91XA SPRAIN OF RIGHT KNEE, UNSPECIFIED LIGAMENT, INITIAL ENCOUNTER: Primary | ICD-10-CM

## 2022-03-01 DIAGNOSIS — S39.012A STRAIN OF LUMBAR REGION, INITIAL ENCOUNTER: ICD-10-CM

## 2022-03-01 DIAGNOSIS — M25.461 EFFUSION OF RIGHT KNEE: ICD-10-CM
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
## 2022-03-01 PROCEDURE — 73560 X-RAY EXAM OF KNEE 1 OR 2: CPT

## 2022-03-01 PROCEDURE — 99283 EMERGENCY DEPT VISIT LOW MDM: CPT

## 2022-03-01 PROCEDURE — 72110 X-RAY EXAM L-2 SPINE 4/>VWS: CPT

## 2022-03-01 RX ORDER — TRAMADOL HYDROCHLORIDE 50 MG/1
50 TABLET ORAL ONCE
Status: COMPLETED | OUTPATIENT
Start: 2022-03-01 | End: 2022-03-01

## 2022-03-01 RX ADMIN — TRAMADOL HYDROCHLORIDE 50 MG: 50 TABLET, COATED ORAL at 09:40

## 2022-03-01 NOTE — ED TRIAGE NOTES
"Patient to er from home with c/o she fell while walking down some stairs. Reported her right knee went out on her.\" patient c/o right knee pain and lower back pain. Patient reported she is on blood thinners. Patient reported no head injury with this. Patient has mask on in triage along with staff.   "

## 2022-03-01 NOTE — DISCHARGE INSTRUCTIONS
Wear Ace wrap, use walker, and limit the weightbearing on your right leg for the next 5 to 7 days.  Apply ice to your right knee off and on for the next 12 hours.  Follow-up with Dr. Thacker if symptoms are not improving in the next 5 to 7 days.  Return to the emergency department for worsening pain, knee swelling, numbness/tingling/weakness in your legs, loss of bowel/bladder control, or other concern.

## 2022-03-01 NOTE — ED PROVIDER NOTES
" EMERGENCY DEPARTMENT ENCOUNTER    Room Number:  10/10  Date of encounter:  3/1/2022  PCP: Luis Davis MD  Historian: Patient     I used full protective equipment while examining this patient.  This includes face mask, gloves and protective eyewear.  I washed my hands before entering the room and immediately upon leaving the room.  Patient was wearing a surgical mask.      HPI:  Chief Complaint: Fall  A complete HPI/ROS/PMH/PSH/SH/FH are unobtainable due to: None    Context: Lili Ruiz is a 77 y.o. female who presents to the ED c/o fall.  Patient was walking down the stairs yesterday at around 12 PM.  She got to the second to last step and her right knee \"gave out\".  She fell backwards and twisted her right knee.  She reports landing on her buttocks.  She has been able to ambulate today but is \"hobbling\".  Denies numbness/tingling/weakness in her extremities, neck pain, chest pain, shortness of breath, abdominal pain, nausea, or vomiting..  Denies hitting her head or losing consciousness.  She also reports having some low back pain that began earlier today.  She believes this is because she is \"walking funny\".  Pain does not radiate.  Denies loss of bowel/bladder control or saddle anesthesia.  Patient takes Eliquis.  Pain is worse with movement and walking.  Pain is currently moderate.  Denies previous knee or back surgery.      PAST MEDICAL HISTORY  Active Ambulatory Problems     Diagnosis Date Noted   • Visual impairment    • Peptic ulceration    • Low back pain    • Kidney stone    • Hypertension    • Hyperlipidemia    • Diverticulosis    • Depression    • Cancer (HCC)    • Atrial fibrillation (HCC)    • Arthritis    • Anxiety    • Human metapneumovirus (hMPV) pneumonia    • Paroxysmal supraventricular tachycardia (HCC) 10/03/2017   • Esophageal reflux 03/25/2013   • Ductal carcinoma in situ (DCIS) of right breast 05/29/2015   • Carotid artery stenosis 10/03/2017   • Family history of malignant " neoplasm of gastrointestinal tract 03/25/2013   • Family history of malignant neoplasm of genital organ, other 03/25/2013   • Mitral annular calcification 11/04/2019   • Chronic anticoagulation 05/04/2020   • Dysphagia 01/25/2021   • Functional diarrhea 01/25/2021   • Aortic insufficiency 12/07/2021     Resolved Ambulatory Problems     Diagnosis Date Noted   • Obesity      Past Medical History:   Diagnosis Date   • Aortic valve insufficiency    • Breast cancer (HCC)    • Cataract          PAST SURGICAL HISTORY  Past Surgical History:   Procedure Laterality Date   • APPENDECTOMY     • BREAST BIOPSY     • BREAST SURGERY     • CHOLECYSTECTOMY     • COLONOSCOPY  appox 2014   • COLONOSCOPY W/ POLYPECTOMY N/A 3/10/2021    Procedure: COLONOSCOPY  TO CECUM WITH BIOPSY AND POLYPECTOMY (COLD SNARE);  Surgeon: Joel Galan MD;  Location: [facility];  Service: Gastroenterology;  Laterality: N/A;   DIARRHEA  POST: COLON POLYPS , HEMORRHOIDS  (COLD SNARE SUPPLY SAMPLE USED)   • ENDOSCOPY N/A 3/10/2021    Procedure: ESOPHAGOGASTRODUODENOSCOPY WITH BIOPSY;  Surgeon: Joel Galan MD;  Location: [facility];  Service: Gastroenterology;  Laterality: N/A;  DYSPHAGIA; DIARRHEA  POST: SCHIATZKI'S RING; GASTRIC NODULE   • GASTRIC BYPASS     • MASTECTOMY           FAMILY HISTORY  Family History   Problem Relation Age of Onset   • Cancer Mother    • Ovarian cancer Mother    • Heart disease Father    • Colon cancer Maternal Aunt          SOCIAL HISTORY  Social History     Socioeconomic History   • Marital status:    Tobacco Use   • Smoking status: Former Smoker   • Smokeless tobacco: Never Used   • Tobacco comment: 50 yrs ago   Substance and Sexual Activity   • Alcohol use: No   • Drug use: Never         ALLERGIES  Penicillins, Hydrocodone-acetaminophen, and Sotalol hcl       REVIEW OF SYSTEMS  Review of Systems      All systems have been reviewed and are negative except as as discussed in the  HPI    PHYSICAL EXAM    I have reviewed the triage vital signs and nursing notes.    ED Triage Vitals   Temp Heart Rate Resp BP SpO2   03/01/22 0934 03/01/22 0923 03/01/22 0923 -- 03/01/22 0923   98.7 °F (37.1 °C) 78 20  95 %      Temp src Heart Rate Source Patient Position BP Location FiO2 (%)   03/01/22 0934 -- -- -- --   Tympanic           Physical Exam  GENERAL: Awake, alert, oriented x3.  Resting comfortably no acute distress  HENT: NCAT, nares patent  NECK: supple, C-spine is nontender  EYES: Extraocular muscles intact  CV: regular rhythm, regular rate  RESPIRATORY: normal effort, clear to auscultation bilaterally  ABDOMEN: soft, nontender  MUSCULOSKELETAL: There is tenderness over the superior and lateral aspect of the right anterior knee.  There is normal range of motion of the right knee.  Remainder of the right leg and all other extremities are nontender.  Extremities are  without obvious deformity.  T-spine is nontender.  There is mild tenderness of the lumbar paraspinal muscles bilaterally.  Chest is nontender.  NEURO: GCS 15.  Speech is clear and fluent.  Follows commands.  Normal strength and light touch sensation in all extremities.   No saddle anesthesia.  Straight leg raise negative bilaterally  SKIN: warm, dry, no rash  PSYCH: Normal mood and affect      LAB RESULTS  No results found for this or any previous visit (from the past 24 hour(s)).    Ordered the above labs and independently reviewed the results.      RADIOLOGY  XR Knee 1 or 2 View Right, XR Spine Lumbar Complete 4+VW    Result Date: 3/1/2022  Right knee and lumbar spine radiograph  HISTORY:Injury  TECHNIQUE: AP, oblique and lateral radiographs of the right knee and AP, lateral, oblique and Spot views of the lumbar spine  COMPARISON:CT abdomen and pelvis 10/20/2020      FINDINGS AND IMPRESSION: Right knee: There is a small-to-moderate joint effusion. No fracture or dislocation is present. There is mild osteoarthritis, most notably within  the lateral compartment.  Lumbar spine: Multiple surgical clips and chain sutures overlie the upper abdomen. Mild dextrocurvature of the lumbar spine is present. No lumbar spine fracture or malalignment is seen multilevel at least moderate degenerative changes are present, centrally within the lower lumbar spine. Findings can be further evaluated with MRI if clinically indicated.  This report was finalized on 3/1/2022 10:38 AM by Dr. Marcos Oquendo M.D.        I ordered the above noted radiological studies. Reviewed by me and discussed with radiologist.  See dictation for official radiology interpretation.      PROCEDURES  Procedures      MEDICATIONS GIVEN IN ER    Medications   traMADol (ULTRAM) tablet 50 mg (50 mg Oral Given 3/1/22 0940)         PROGRESS, DATA ANALYSIS, CONSULTS, AND MEDICAL DECISION MAKING    All labs have been independently reviewed by me.  All radiology studies have been reviewed by me and discussed with radiologist dictating the report.   EKG's independently viewed and interpreted by me.  I have reviewed the nurse's notes, vital signs, past medical history, and medication list.  Discussion below represents my analysis of pertinent findings related to patient's condition, differential diagnosis, treatment plan and final disposition.      ED Course as of 03/01/22 1326   Tue Mar 01, 2022   1044 X-rays interpreted by the radiologist.  Images independently viewed by me.  Right knee x-ray shows a small to moderate joint effusion but no fracture or dislocation.  There is mild degenerative osteoarthritis.  L-spine x-rays are negative for fracture.  There are multilevel degenerative changes. [WH]   1059 X-ray results discussed with the patient.  She will be discharged with a walker.  Discussed with her placing a knee immobilizer versus Ace wrap and she prefers an Ace wrap.  She will be referred to orthopedics for follow-up.  She takes Ultram at home.  I told her she could also take Tylenol as needed.   Return precautions were discussed. [WH]      ED Course User Index  [WH] Khris Aquino MD       AS OF 13:26 EST VITALS:    BP - 137/71  HR - 78  TEMP - 98.7 °F (37.1 °C) (Tympanic)  O2 SATS - 95%      DIAGNOSIS  Final diagnoses:   Sprain of right knee, unspecified ligament, initial encounter   Effusion of right knee   Strain of lumbar region, initial encounter   Fall, initial encounter         DISPOSITION  Discharge    DISCHARGE    Patient discharged in stable condition.    Reviewed implications of results, diagnosis, meds, responsibility to follow up, warning signs and symptoms of possible worsening, potential complications and reasons to return to ER, including worsening pain, knee swelling, trouble walking, numbness/tingling/weakness in legs, loss of bowel/bladder control, or other concern..    Patient/Family voiced understanding of above instructions.    Discussed plan for discharge, as there is no emergent indication for admission. Patient referred to primary care provider for BP management due to today's BP. Pt/family is agreeable and understands need for follow up and repeat testing.  Pt is aware that discharge does not mean that nothing is wrong but it indicates no emergency is present that requires admission and they must continue care with follow-up as given below or physician of their choice.     FOLLOW-UP  Luis Davis MD  [address] [facility] [id number] [phone]    Schedule an appointment as soon as possible for a visit       Alberto Thacker MD  [address] [facility] [address]  [facility] [id number] [phone]    Schedule an appointment as soon as possible for a visit   Right knee sprain         Medication List      No changes were made to your prescriptions during this visit.           Dictated utilizing Dragon dictation:  Much of this encounter note is an electronic transcription/translation of spoken language to printed text. The electronic translation of spoken language  may permit erroneous, or at times, nonsensical words or phrases to be inadvertently transcribed; Although I have reviewed the note for such errors, some may still exist.     Khris Aquino MD  03/01/22 7557

## 2022-03-03 RX ORDER — PRAVASTATIN SODIUM 20 MG
20 TABLET ORAL DAILY
Qty: 30 TABLET | Refills: 3 | Status: SHIPPED | OUTPATIENT
Start: 2022-03-03 | End: 2022-04-25

## 2022-03-03 NOTE — TELEPHONE ENCOUNTER
Caller: Lili Ruiz    Relationship: Self    Best call back number: [phone]    Requested Prescriptions:   Requested Prescriptions     Pending Prescriptions Disp Refills   • pravastatin (PRAVACHOL) 20 MG tablet 30 tablet 3     Sig: Take 1 tablet by mouth Daily.        Pharmacy where request should be sent: [id number] [address] AT [facility] [phone] [facility] [phone] FX     Additional details provided by patient: PATIENT STATES SHE WILL RUN OUT Monday 3/7/2022. WOULD LIKE TO REQUEST ENOUGH TO LAST UNTIL APPOINTMENT IN June. HAD BP CHECKED AT ER ON 3/1/2022, /71    Does the patient have less than a 3 day supply:  [] Yes  [x] No    Prashanth Elise Rep   03/03/22 08:45 EST

## 2022-04-25 RX ORDER — PRAVASTATIN SODIUM 20 MG
20 TABLET ORAL DAILY
Qty: 30 TABLET | Refills: 3 | Status: SHIPPED | OUTPATIENT
Start: 2022-04-25 | End: 2022-04-26

## 2022-04-26 RX ORDER — PRAVASTATIN SODIUM 20 MG
20 TABLET ORAL DAILY
Qty: 30 TABLET | Refills: 3 | Status: SHIPPED | OUTPATIENT
Start: 2022-04-26 | End: 2022-06-30 | Stop reason: SDUPTHER

## 2022-05-11 ENCOUNTER — OFFICE VISIT (OUTPATIENT)
Dept: GASTROENTEROLOGY | Facility: CLINIC | Age: 77
End: 2022-05-11

## 2022-05-11 VITALS
SYSTOLIC BLOOD PRESSURE: 130 MMHG | DIASTOLIC BLOOD PRESSURE: 76 MMHG | BODY MASS INDEX: 34.91 KG/M2 | WEIGHT: 197 LBS | TEMPERATURE: 97.1 F | HEIGHT: 63 IN

## 2022-05-11 DIAGNOSIS — Z86.010 PERSONAL HISTORY OF COLONIC POLYPS: ICD-10-CM

## 2022-05-11 DIAGNOSIS — K58.0 IRRITABLE BOWEL SYNDROME WITH DIARRHEA: Primary | ICD-10-CM

## 2022-05-11 PROCEDURE — 99214 OFFICE O/P EST MOD 30 MIN: CPT | Performed by: NURSE PRACTITIONER

## 2022-05-11 NOTE — PROGRESS NOTES
Chief Complaint   Patient presents with   • Irritable Bowel Syndrome       HPI    Lili Ruiz is a  77 y.o. female here for a follow up visit for IBS-D.    This patient follows with Dr. Galan and myself.    Endoscopic evaluation dated 03/10/2021 as follows:     EGD with nonobstructing Schatzki's ring and a nodule found in the stomach.  Colonoscopy with normal ileum, polyps, nonbleeding internal hemorrhoids.  Pathology showed mild esophagitis and no Murguia's. Polyps were a mixture of hyperplastic and tubular adenoma. Random colon biopsies did not show evidence of microscopic colitis.    She has been maintained on the combination of probiotics and IBgard over the last several years.  She reports increase in diarrhea symptoms around January and increase the amount of IBgard she has been taking up to 6 tablets a day with align probiotic.  She has been on align for greater than 12 months.  She is currently having 2 formed stools a day.  Denies gas, bloating, abdominal pain, rectal pain, rectal bleeding.  She feels like she is in a good place with her bowel regimen but ideally would like to take less IBgard due to out-of-pocket expense.    No upper GI complaints.  Takes omeprazole when needed.     Past Medical History:   Diagnosis Date   • Anxiety    • Aortic valve insufficiency    • Arthritis    • Atrial fibrillation (HCC)    • Breast cancer (HCC)    • Cancer (HCC)     Breast   • Cataract     bilateral eyes   • Depression    • Diverticulosis    • Human metapneumovirus (hMPV) pneumonia    • Hyperlipidemia    • Hypertension    • Kidney stone    • Low back pain    • Obesity    • Peptic ulceration    • Visual impairment        Past Surgical History:   Procedure Laterality Date   • APPENDECTOMY     • BREAST BIOPSY     • BREAST SURGERY     • CHOLECYSTECTOMY     • COLONOSCOPY  appox 2014   • COLONOSCOPY W/ POLYPECTOMY N/A 3/10/2021    Procedure: COLONOSCOPY  TO CECUM WITH BIOPSY AND POLYPECTOMY (COLD SNARE);  Surgeon:  Joel Galan MD;  Location: [facility] ENDOSCOPY;  Service: Gastroenterology;  Laterality: N/A;   DIARRHEA  POST: COLON POLYPS , HEMORRHOIDS  (COLD SNARE SUPPLY SAMPLE USED)   • ENDOSCOPY N/A 3/10/2021    Procedure: ESOPHAGOGASTRODUODENOSCOPY WITH BIOPSY;  Surgeon: Joel Galan MD;  Location: [facility] ENDOSCOPY;  Service: Gastroenterology;  Laterality: N/A;  DYSPHAGIA; DIARRHEA  POST: SCHIATZKI'S RING; GASTRIC NODULE   • GASTRIC BYPASS     • MASTECTOMY         Scheduled Meds:     Continuous Infusions: No current facility-administered medications for this visit.      PRN Meds:     Allergies   Allergen Reactions   • Penicillins Shortness Of Breath and Itching   • Hydrocodone-Acetaminophen    • Sotalol Hcl        Social History     Socioeconomic History   • Marital status:    Tobacco Use   • Smoking status: Former Smoker   • Smokeless tobacco: Never Used   • Tobacco comment: 50 yrs ago   Substance and Sexual Activity   • Alcohol use: No   • Drug use: Never       Family History   Problem Relation Age of Onset   • Cancer Mother    • Ovarian cancer Mother    • Heart disease Father    • Colon cancer Maternal Aunt        Review of Systems   Constitutional: Negative for activity change, appetite change, fatigue, fever and unexpected weight change.   HENT: Negative for trouble swallowing.    Respiratory: Negative for apnea, cough, choking, chest tightness, shortness of breath and wheezing.    Cardiovascular: Negative for chest pain, palpitations and leg swelling.   Gastrointestinal: Negative for abdominal distention, abdominal pain, anal bleeding, blood in stool, constipation, diarrhea, nausea, rectal pain and vomiting.       Vitals:    05/11/22 0846   BP: 130/76   Temp: 97.1 °F (36.2 °C)       Physical Exam  Constitutional:       Appearance: She is well-developed.   Abdominal:      General: Bowel sounds are normal. There is no distension.      Palpations: Abdomen is soft. There is no mass.       Tenderness: There is no abdominal tenderness. There is no guarding.      Hernia: No hernia is present.   Skin:     General: Skin is warm and dry.      Capillary Refill: Capillary refill takes less than 2 seconds.   Neurological:      Mental Status: She is alert and oriented to person, place, and time.   Psychiatric:         Behavior: Behavior normal.       Assessment    Diagnoses and all orders for this visit:    1. Irritable bowel syndrome with diarrhea (Primary)    2. Personal history of colonic polyps       Plan    Stable IBS-D currently on combination of probiotics and IBgard.  We discussed the benefits of rotating probiotics every 6 months to maximize efficacy and recommend she transition from align to [employer] in the hopes that she can reduce the amount of IBgard she is using.  I did provide her with additional samples of IBgard.  We also discussed the benefits of Xifaxan in the treatment of irritable bowel syndrome but she would like to hold off for now.  She is up-to-date in terms of colon cancer screening.  Return to clinic 1 year or sooner if needed.         SHANON Alonso  [facility]  [address] [place] [address]  Office: [phone]    I spent 30 minutes caring for Lili on this date of service. This time includes time spent by me in the following activities: preparing for the visit, reviewing tests, obtaining and/or reviewing a separately obtained history, performing a medically appropriate examination and/or evaluation , counseling and educating the patient/family/caregiver, ordering medications, tests, or procedures, documenting information in the medical record, independently interpreting results and communicating that information with the patient/family/caregiver and care coordination.

## 2022-06-08 ENCOUNTER — OFFICE VISIT (OUTPATIENT)
Dept: FAMILY MEDICINE CLINIC | Facility: CLINIC | Age: 77
End: 2022-06-08

## 2022-06-08 VITALS
HEIGHT: 63 IN | OXYGEN SATURATION: 99 % | HEART RATE: 66 BPM | TEMPERATURE: 97.3 F | SYSTOLIC BLOOD PRESSURE: 122 MMHG | DIASTOLIC BLOOD PRESSURE: 70 MMHG | WEIGHT: 192.6 LBS | BODY MASS INDEX: 34.12 KG/M2

## 2022-06-08 DIAGNOSIS — E55.9 VITAMIN D DEFICIENCY: ICD-10-CM

## 2022-06-08 DIAGNOSIS — R42 DIZZINESS: Primary | ICD-10-CM

## 2022-06-08 DIAGNOSIS — I10 ESSENTIAL HYPERTENSION: ICD-10-CM

## 2022-06-08 DIAGNOSIS — I48.0 PAROXYSMAL ATRIAL FIBRILLATION: ICD-10-CM

## 2022-06-08 DIAGNOSIS — R01.1 MURMUR, CARDIAC: ICD-10-CM

## 2022-06-08 PROBLEM — I07.1 TRICUSPID REGURGITATION: Status: ACTIVE | Noted: 2022-06-08

## 2022-06-08 LAB
25(OH)D3 SERPL-MCNC: 72.2 NG/ML (ref 30–100)
ALBUMIN SERPL-MCNC: 4.2 G/DL (ref 3.5–5.2)
ALBUMIN/GLOB SERPL: 1.7 G/DL
ALP SERPL-CCNC: 46 U/L (ref 39–117)
ALT SERPL W P-5'-P-CCNC: 14 U/L (ref 1–33)
ANION GAP SERPL CALCULATED.3IONS-SCNC: 10.4 MMOL/L (ref 5–15)
AST SERPL-CCNC: 14 U/L (ref 1–32)
BILIRUB SERPL-MCNC: 0.4 MG/DL (ref 0–1.2)
BUN SERPL-MCNC: 15 MG/DL (ref 8–23)
BUN/CREAT SERPL: 20.5 (ref 7–25)
CALCIUM SPEC-SCNC: 10.2 MG/DL (ref 8.6–10.5)
CHLORIDE SERPL-SCNC: 104 MMOL/L (ref 98–107)
CHOLEST SERPL-MCNC: 173 MG/DL (ref 0–200)
CO2 SERPL-SCNC: 27.6 MMOL/L (ref 22–29)
CREAT SERPL-MCNC: 0.73 MG/DL (ref 0.57–1)
DEPRECATED RDW RBC AUTO: 47.3 FL (ref 37–54)
EGFRCR SERPLBLD CKD-EPI 2021: 85.9 ML/MIN/1.73
ERYTHROCYTE [DISTWIDTH] IN BLOOD BY AUTOMATED COUNT: 13.4 % (ref 12.3–15.4)
GLOBULIN UR ELPH-MCNC: 2.5 GM/DL
GLUCOSE SERPL-MCNC: 86 MG/DL (ref 65–99)
HCT VFR BLD AUTO: 41.5 % (ref 34–46.6)
HDLC SERPL-MCNC: 63 MG/DL (ref 40–60)
HGB BLD-MCNC: 13.1 G/DL (ref 12–15.9)
LDLC SERPL CALC-MCNC: 98 MG/DL (ref 0–100)
LDLC/HDLC SERPL: 1.55 {RATIO}
MCH RBC QN AUTO: 30.8 PG (ref 26.6–33)
MCHC RBC AUTO-ENTMCNC: 31.6 G/DL (ref 31.5–35.7)
MCV RBC AUTO: 97.4 FL (ref 79–97)
PLATELET # BLD AUTO: 209 10*3/MM3 (ref 140–450)
PMV BLD AUTO: 11.6 FL (ref 6–12)
POTASSIUM SERPL-SCNC: 3.9 MMOL/L (ref 3.5–5.2)
PROT SERPL-MCNC: 6.7 G/DL (ref 6–8.5)
RBC # BLD AUTO: 4.26 10*6/MM3 (ref 3.77–5.28)
SODIUM SERPL-SCNC: 142 MMOL/L (ref 136–145)
TRIGL SERPL-MCNC: 62 MG/DL (ref 0–150)
VLDLC SERPL-MCNC: 12 MG/DL (ref 5–40)
WBC NRBC COR # BLD: 4.99 10*3/MM3 (ref 3.4–10.8)

## 2022-06-08 PROCEDURE — 99214 OFFICE O/P EST MOD 30 MIN: CPT | Performed by: INTERNAL MEDICINE

## 2022-06-08 PROCEDURE — 82306 VITAMIN D 25 HYDROXY: CPT | Performed by: INTERNAL MEDICINE

## 2022-06-08 PROCEDURE — 80053 COMPREHEN METABOLIC PANEL: CPT | Performed by: INTERNAL MEDICINE

## 2022-06-08 PROCEDURE — 85027 COMPLETE CBC AUTOMATED: CPT | Performed by: INTERNAL MEDICINE

## 2022-06-08 PROCEDURE — 80061 LIPID PANEL: CPT | Performed by: INTERNAL MEDICINE

## 2022-06-08 PROCEDURE — 36415 COLL VENOUS BLD VENIPUNCTURE: CPT | Performed by: INTERNAL MEDICINE

## 2022-06-08 RX ORDER — TRAMADOL HYDROCHLORIDE 50 MG/1
50 TABLET ORAL EVERY 8 HOURS PRN
Qty: 60 TABLET | Refills: 2 | Status: SHIPPED | OUTPATIENT
Start: 2022-06-08 | End: 2023-02-24 | Stop reason: SDUPTHER

## 2022-06-08 NOTE — PROGRESS NOTES
Subjective   Lili Ruiz is a 77 y.o. female.     Vitals:    06/08/22 0831   BP: 122/70   Pulse: 66   Temp: 97.3 °F (36.3 °C)   SpO2: 99%      Body mass index is 34.13 kg/m².     History of Present Illness   Patient was seen for dizziness with near syncope.  Patient has fallen multiple times and has become dizzy and suddenly collapsed.  Patient's had a 15-day Holter did not show any atrial fibrillation but did show some SVT but was not correlated with symptoms.  Patient also had a 2D echo which showed mitral annular calcifications with regurg and tricuspid regurg.  Echo was done in 2019 as well as the Holter.  Patient is having a 2D echo reordered and will follow-up.  Patient is being treated for atrial fibrillation with flecainide for rate control and apixaban for clot prevention.  Patient blood pressures running 120s over 70s.  She will continue lisinopril 40 mg daily.  Patient will continue monitoring blood pressure.  Patient is having labs today and will follow-up.  Patient does have low back pain and requires Reminyl for pain relief.    The patient has read and signed the [facility] Controlled Substance Contract.  I will continue to see patient for regular follow up appointments.  They are well controlled on their medication.  VEDA has been reviewed by me and is updated every 3 months. The patient is aware of the potential for addiction and dependence.      Dictated utilizing Dragon dictation. If there are questions or for further clarification, please contact me.  The following portions of the patient's history were reviewed and updated as appropriate: allergies, current medications, past family history, past medical history, past social history, past surgical history and problem list.    Review of Systems   Constitutional: Negative for fatigue and fever.   HENT: Positive for congestion. Negative for trouble swallowing.    Eyes: Negative for discharge and visual disturbance.   Respiratory: Negative  for choking and shortness of breath.    Cardiovascular: Negative for chest pain and palpitations.   Gastrointestinal: Negative for abdominal pain and blood in stool.   Endocrine: Negative.    Genitourinary: Negative for genital sores and hematuria.   Musculoskeletal: Positive for back pain. Negative for gait problem and joint swelling.   Skin: Negative for color change, pallor, rash and wound.   Allergic/Immunologic: Positive for environmental allergies. Negative for immunocompromised state.   Neurological: Positive for dizziness and light-headedness. Negative for facial asymmetry and speech difficulty.   Psychiatric/Behavioral: Negative for hallucinations and suicidal ideas.       Objective   Physical Exam  Vitals and nursing note reviewed.   Constitutional:       Appearance: Normal appearance. She is well-developed.   HENT:      Head: Normocephalic and atraumatic.      Nose: Nose normal.      Mouth/Throat:      Mouth: Mucous membranes are moist.      Pharynx: Oropharynx is clear.   Eyes:      Extraocular Movements: Extraocular movements intact.      Conjunctiva/sclera: Conjunctivae normal.      Pupils: Pupils are equal, round, and reactive to light.   Cardiovascular:      Rate and Rhythm: Normal rate and regular rhythm.      Heart sounds: Murmur heard.     No friction rub. No gallop.   Pulmonary:      Effort: Pulmonary effort is normal. No respiratory distress.      Breath sounds: Normal breath sounds. No stridor. No wheezing, rhonchi or rales.   Chest:      Chest wall: No tenderness.   Abdominal:      General: Bowel sounds are normal.      Palpations: Abdomen is soft.   Musculoskeletal:         General: Tenderness present. Normal range of motion.      Cervical back: Normal range of motion and neck supple.   Skin:     General: Skin is warm and dry.   Neurological:      General: No focal deficit present.      Mental Status: She is alert and oriented to person, place, and time. Mental status is at baseline.    Psychiatric:         Mood and Affect: Mood normal.         Behavior: Behavior normal.         Thought Content: Thought content normal.         Judgment: Judgment normal.         Assessment & Plan Number one 2D echo, patient prefers to get it at her cardiology office.  #2 continue monitoring blood pressure #3 labs  Problems Addressed this Visit        Cardiac and Vasculature    Atrial fibrillation (HCC)    Relevant Orders    CBC (No Diff)    Comprehensive Metabolic Panel    Lipid Panel    Vitamin D 25 Hydroxy      Other Visit Diagnoses     Dizziness    -  Primary    Murmur, cardiac        Relevant Orders    Adult Transthoracic Echo Complete W/ Cont if Necessary Per Protocol    CBC (No Diff)    Comprehensive Metabolic Panel    Lipid Panel    Vitamin D 25 Hydroxy    Essential hypertension        Relevant Orders    CBC (No Diff)    Comprehensive Metabolic Panel    Lipid Panel    Vitamin D 25 Hydroxy      Diagnoses     Diagnosis Codes Comments    Dizziness    -  Primary ICD-10-CM: R42  ICD-9-CM: 780.4     Murmur, cardiac     ICD-10-CM: R01.1  ICD-9-CM: 785.2     Essential hypertension     ICD-10-CM: I10  ICD-9-CM: 401.9     Paroxysmal atrial fibrillation (HCC)     ICD-10-CM: I48.0  ICD-9-CM: 427.31

## 2022-06-30 RX ORDER — PRAVASTATIN SODIUM 20 MG
20 TABLET ORAL DAILY
Qty: 30 TABLET | Refills: 3 | Status: SHIPPED | OUTPATIENT
Start: 2022-06-30 | End: 2022-11-02

## 2022-08-17 ENCOUNTER — TRANSCRIBE ORDERS (OUTPATIENT)
Dept: ADMINISTRATIVE | Facility: HOSPITAL | Age: 77
End: 2022-08-17

## 2022-08-17 DIAGNOSIS — Z12.31 VISIT FOR SCREENING MAMMOGRAM: Primary | ICD-10-CM

## 2022-09-07 ENCOUNTER — HOSPITAL ENCOUNTER (OUTPATIENT)
Dept: MAMMOGRAPHY | Facility: HOSPITAL | Age: 77
Discharge: HOME | End: 2022-09-07
Admitting: INTERNAL MEDICINE

## 2022-09-07 DIAGNOSIS — Z12.31 VISIT FOR SCREENING MAMMOGRAM: ICD-10-CM

## 2022-09-07 PROCEDURE — 77063 BREAST TOMOSYNTHESIS BI: CPT

## 2022-09-07 PROCEDURE — 77067 SCR MAMMO BI INCL CAD: CPT

## 2022-10-12 ENCOUNTER — OFFICE VISIT (OUTPATIENT)
Dept: FAMILY MEDICINE CLINIC | Facility: CLINIC | Age: 77
End: 2022-10-12

## 2022-10-12 VITALS
WEIGHT: 171 LBS | DIASTOLIC BLOOD PRESSURE: 80 MMHG | OXYGEN SATURATION: 99 % | BODY MASS INDEX: 30.3 KG/M2 | SYSTOLIC BLOOD PRESSURE: 122 MMHG | HEIGHT: 63 IN | TEMPERATURE: 97.8 F | HEART RATE: 61 BPM

## 2022-10-12 DIAGNOSIS — E78.00 PURE HYPERCHOLESTEROLEMIA: ICD-10-CM

## 2022-10-12 DIAGNOSIS — I48.91 ATRIAL FIBRILLATION, UNSPECIFIED TYPE: ICD-10-CM

## 2022-10-12 DIAGNOSIS — I10 PRIMARY HYPERTENSION: Primary | ICD-10-CM

## 2022-10-12 PROBLEM — K59.1 FUNCTIONAL DIARRHEA: Status: RESOLVED | Noted: 2021-01-25 | Resolved: 2022-10-12

## 2022-10-12 PROBLEM — K58.0 IRRITABLE BOWEL SYNDROME WITH DIARRHEA: Status: ACTIVE | Noted: 2022-10-12

## 2022-10-12 PROCEDURE — 99214 OFFICE O/P EST MOD 30 MIN: CPT | Performed by: INTERNAL MEDICINE

## 2022-10-12 NOTE — PROGRESS NOTES
"Chief Complaint  Follow-up    Subjective        Lili Ruiz presents to [facility]  History of Present Illness patient was seen for hypertension.  Pressures been running 120s over 80s.  She will continue lisinopril 40 mg daily.  Patient will continue monitoring at home.  Patient's lipids treated with diet exercise and pravastatin 20 mg daily.  Triglycerides 62, HDL 63, LDL 98.  Patient will continue present treatment.  Patient atrial fibs treated with flecainide 100 mg twice daily for rate control and apixaban 5 mg twice daily for clot.  Patient is stable on the present treatment and follows up with her cardiologist on a yearly basis.    Dictated utilizing Dragon dictation. If there are questions or for further clarification, please contact me.    Objective   Vital Signs:  Blood Pressure 122/80 (BP Location: Left arm, Patient Position: Sitting, Cuff Size: Large Adult)   Pulse 61   Temperature 97.8 °F (36.6 °C) (Infrared)   Height 160 cm (62.99\")   Weight 77.6 kg (171 lb)   Oxygen Saturation 99%   Body Mass Index 30.30 kg/m²   Estimated body mass index is 30.3 kg/m² as calculated from the following:    Height as of this encounter: 160 cm (62.99\").    Weight as of this encounter: 77.6 kg (171 lb).          Physical Exam  Vitals and nursing note reviewed.   Constitutional:       Appearance: Normal appearance. She is well-developed.   HENT:      Head: Normocephalic and atraumatic.      Nose: Nose normal.      Mouth/Throat:      Mouth: Mucous membranes are moist.      Pharynx: Oropharynx is clear.   Eyes:      Extraocular Movements: Extraocular movements intact.      Conjunctiva/sclera: Conjunctivae normal.      Pupils: Pupils are equal, round, and reactive to light.   Cardiovascular:      Rate and Rhythm: Normal rate and regular rhythm.      Heart sounds: Normal heart sounds. No murmur heard.    No friction rub. No gallop.   Pulmonary:      Effort: Pulmonary effort is normal. No " respiratory distress.      Breath sounds: Normal breath sounds. No stridor. No wheezing, rhonchi or rales.   Chest:      Chest wall: No tenderness.   Abdominal:      General: Bowel sounds are normal.      Palpations: Abdomen is soft.   Musculoskeletal:         General: Normal range of motion.      Cervical back: Normal range of motion and neck supple.   Skin:     General: Skin is warm and dry.   Neurological:      General: No focal deficit present.      Mental Status: She is alert and oriented to person, place, and time. Mental status is at baseline.   Psychiatric:         Mood and Affect: Mood normal.         Behavior: Behavior normal.         Thought Content: Thought content normal.         Judgment: Judgment normal.        Result Review :                Assessment and Plan  #1 continue monitoring blood pressure at home 2 continue present diet and activity level #3 follow-up in 4 months  Diagnoses and all orders for this visit:    1. Primary hypertension (Primary)    2. Pure hypercholesterolemia    3. Atrial fibrillation, unspecified type (HCC)             Follow Up   Return in about 4 months (around 2/12/2023), or if symptoms worsen or fail to improve, for Recheck.  Patient was given instructions and counseling regarding her condition or for health maintenance advice. Please see specific information pulled into the AVS if appropriate.

## 2022-10-31 ENCOUNTER — OFFICE VISIT (OUTPATIENT)
Dept: FAMILY MEDICINE CLINIC | Facility: CLINIC | Age: 77
End: 2022-10-31

## 2022-10-31 VITALS
SYSTOLIC BLOOD PRESSURE: 128 MMHG | TEMPERATURE: 98 F | OXYGEN SATURATION: 98 % | HEIGHT: 63 IN | RESPIRATION RATE: 18 BRPM | BODY MASS INDEX: 28.99 KG/M2 | DIASTOLIC BLOOD PRESSURE: 78 MMHG | HEART RATE: 76 BPM | WEIGHT: 163.6 LBS

## 2022-10-31 DIAGNOSIS — L72.3 SEBACEOUS CYST: Primary | ICD-10-CM

## 2022-10-31 DIAGNOSIS — I10 PRIMARY HYPERTENSION: ICD-10-CM

## 2022-10-31 DIAGNOSIS — E78.00 PURE HYPERCHOLESTEROLEMIA: ICD-10-CM

## 2022-10-31 PROCEDURE — 99212 OFFICE O/P EST SF 10 MIN: CPT

## 2022-10-31 NOTE — PATIENT INSTRUCTIONS
Keep an eye on the cyst in your groin.  If it begins to grow or you notice any opening please notify our office.    Patient agrees with plan of care and understands instructions. Call if worsening symptoms or any problems or concerns.

## 2022-10-31 NOTE — PROGRESS NOTES
"Chief Complaint  Cyst (Knot on inner thigh/groin region)    Subjective        Lili Ruiz presents to [facility]  History of Present Illness  Patient is a 77-year-old female, new patient to me, patient of Dr. Davis last seen in office 10/12/2022. Pt here today with c/o nodule in right groin area that she noticed Friday night, denies any recent infection. Denies pain when touching. Pt reports she has fatty deposits on her legs and doesn't thing this is the same thing. Denies noticing bulging with movement. History of breast cancer and wanted to get this checked out.  Patient reports she has a history of having abscesses in pelvic region that had to be drained several years ago.  With hypertension-takes lisinopril 40 mg tablet daily.  Today's blood pressure 120/78.  With hyperlipidemia-takes pravastatin 20 mg tablet daily.    Objective   Vital Signs:  /78 (BP Location: Left arm, Patient Position: Sitting)   Pulse 76   Temp 98 °F (36.7 °C) (Infrared)   Resp 18   Ht 160 cm (62.99\")   Wt 74.2 kg (163 lb 9.6 oz)   SpO2 98%   BMI 28.99 kg/m²   Estimated body mass index is 28.99 kg/m² as calculated from the following:    Height as of this encounter: 160 cm (62.99\").    Weight as of this encounter: 74.2 kg (163 lb 9.6 oz).          Physical Exam  Constitutional:       General: She is awake.      Appearance: Normal appearance.   HENT:      Head: Normocephalic and atraumatic.      Nose: Nose normal.   Eyes:      Extraocular Movements: Extraocular movements intact.      Conjunctiva/sclera: Conjunctivae normal.      Pupils: Pupils are equal, round, and reactive to light.   Cardiovascular:      Rate and Rhythm: Normal rate and regular rhythm.      Pulses: Normal pulses.      Heart sounds: Normal heart sounds.   Pulmonary:      Effort: Pulmonary effort is normal.      Breath sounds: Normal breath sounds and air entry.   Musculoskeletal:      Cervical back: Full passive range of " motion without pain, normal range of motion and neck supple.   Skin:     General: Skin is warm and dry.      Comments: Soft, movable, nontender palpable 1 cm cyst-like mass in right groin.  No infection noted.   Neurological:      General: No focal deficit present.      Mental Status: She is alert and oriented to person, place, and time. Mental status is at baseline.   Psychiatric:         Attention and Perception: Attention normal.         Behavior: Behavior normal. Behavior is cooperative.        Result Review :                Assessment and Plan   Diagnoses and all orders for this visit:    1. Sebaceous cyst (Primary)    2. Primary hypertension    3. Pure hypercholesterolemia    Keep an eye on the cyst in your groin.  If it begins to grow or you notice any opening please notify our office.    Patient agrees with plan of care and understands instructions. Call if worsening symptoms or any problems or concerns.            Follow Up   Return if symptoms worsen or fail to improve.  Patient was given instructions and counseling regarding her condition or for health maintenance advice. Please see specific information pulled into the AVS if appropriate.

## 2022-11-02 RX ORDER — PRAVASTATIN SODIUM 20 MG
20 TABLET ORAL DAILY
Qty: 30 TABLET | Refills: 3 | Status: SHIPPED | OUTPATIENT
Start: 2022-11-02 | End: 2023-02-24 | Stop reason: SDUPTHER

## 2023-01-17 ENCOUNTER — OFFICE VISIT (OUTPATIENT)
Dept: GASTROENTEROLOGY | Facility: CLINIC | Age: 78
End: 2023-01-17
Payer: MEDICARE

## 2023-01-17 VITALS — WEIGHT: 159.4 LBS | BODY MASS INDEX: 27.21 KG/M2 | TEMPERATURE: 96.5 F | HEIGHT: 64 IN

## 2023-01-17 DIAGNOSIS — Z86.010 PERSONAL HISTORY OF COLONIC POLYPS: ICD-10-CM

## 2023-01-17 DIAGNOSIS — K58.0 IRRITABLE BOWEL SYNDROME WITH DIARRHEA: Primary | ICD-10-CM

## 2023-01-17 DIAGNOSIS — K21.9 GASTROESOPHAGEAL REFLUX DISEASE, UNSPECIFIED WHETHER ESOPHAGITIS PRESENT: ICD-10-CM

## 2023-01-17 PROCEDURE — 99214 OFFICE O/P EST MOD 30 MIN: CPT | Performed by: NURSE PRACTITIONER

## 2023-01-17 RX ORDER — NITAZOXANIDE 500 MG/1
500 TABLET ORAL 2 TIMES DAILY WITH MEALS
Qty: 6 TABLET | Refills: 0 | COMMUNITY
Start: 2023-01-17 | End: 2023-01-20

## 2023-01-17 NOTE — PROGRESS NOTES
Chief Complaint   Patient presents with   • IBS with diarrhea       HPI    Lili Ruiz is a  77 y.o. female here for a follow up visit for irritable bowel syndrome diarrhea predominant.    This patient follows with Dr. Galan known to me.    Past GI medical history pertinent for Schatzki's ring, colon polyps, esophagitis along with internal hemorrhoids.    Today she reports increase in IBS-like symptoms since the holidays.  She is having more episodes of diarrhea, gas and bloating in between she will pass formed stools.  Associated fecal urgency.  That being said reports improvement as of late.  She takes probiotics and rotates every 6 months along with IBgard 2 to 6 tablets a day depending on symptoms.  She is historically been reluctant to more aggressive treatment with medication such as Xifaxan, Alinia, or Pamelor.  Denies abdominal pain, rectal pain, rectal bleeding.  Her appetite is good.  Her weight is stable in fact she is gained weight.  She been keeping a food diary and finds it is difficult to identify dietary triggers.    No upper GI complaints.  She continues on over-the-counter omeprazole to manage GERD.    Past Medical History:   Diagnosis Date   • Anxiety    • Aortic valve insufficiency    • Arthritis    • Atrial fibrillation (HCC)    • Breast cancer (HCC)    • Cancer (HCC)     Breast   • Cataract     bilateral eyes   • Depression    • Diverticulosis    • Human metapneumovirus (hMPV) pneumonia    • Hyperlipidemia    • Hypertension    • Kidney stone    • Low back pain    • Obesity    • Peptic ulceration    • Visual impairment        Past Surgical History:   Procedure Laterality Date   • APPENDECTOMY     • BREAST BIOPSY     • BREAST SURGERY     • CHOLECYSTECTOMY     • COLONOSCOPY  appox 2014   • COLONOSCOPY W/ POLYPECTOMY N/A 3/10/2021    Procedure: COLONOSCOPY  TO CECUM WITH BIOPSY AND POLYPECTOMY (COLD SNARE);  Surgeon: Joel Galan MD;  Location: [facility] ENDOSCOPY;  Service:  Gastroenterology;  Laterality: N/A;   DIARRHEA  POST: COLON POLYPS , HEMORRHOIDS  (COLD SNARE SUPPLY SAMPLE USED)   • ENDOSCOPY N/A 3/10/2021    Procedure: ESOPHAGOGASTRODUODENOSCOPY WITH BIOPSY;  Surgeon: Joel Galan MD;  Location: [facility] ENDOSCOPY;  Service: Gastroenterology;  Laterality: N/A;  DYSPHAGIA; DIARRHEA  POST: SCHIATZKI'S RING; GASTRIC NODULE   • GASTRIC BYPASS     • MASTECTOMY         Scheduled Meds:     Continuous Infusions: No current facility-administered medications for this visit.      PRN Meds:     Allergies   Allergen Reactions   • Penicillins Shortness Of Breath and Itching   • Hydrocodone-Acetaminophen    • Sotalol Hcl        Social History     Socioeconomic History   • Marital status:    Tobacco Use   • Smoking status: Former   • Smokeless tobacco: Never   • Tobacco comments:     50 yrs ago   Substance and Sexual Activity   • Alcohol use: No   • Drug use: Never       Family History   Problem Relation Age of Onset   • Cancer Mother    • Ovarian cancer Mother    • Heart disease Father    • Colon cancer Maternal Aunt        Review of Systems   Constitutional: Negative for activity change, appetite change, fatigue, fever and unexpected weight change.   HENT: Negative for trouble swallowing.    Respiratory: Negative for apnea, cough, choking, chest tightness, shortness of breath and wheezing.    Cardiovascular: Negative for chest pain, palpitations and leg swelling.   Gastrointestinal: Positive for diarrhea. Negative for abdominal distention, abdominal pain, anal bleeding, blood in stool, constipation, nausea, rectal pain and vomiting.       Vitals:    01/17/23 0933   Temp: 96.5 °F (35.8 °C)       Physical Exam  Constitutional:       Appearance: She is well-developed.   Abdominal:      General: Bowel sounds are normal. There is no distension.      Palpations: Abdomen is soft. There is no mass.      Tenderness: There is no abdominal tenderness. There is no guarding.      Hernia:  No hernia is present.   Skin:     General: Skin is warm and dry.      Capillary Refill: Capillary refill takes less than 2 seconds.   Neurological:      Mental Status: She is alert and oriented to person, place, and time.   Psychiatric:         Behavior: Behavior normal.     Assessment    Diagnoses and all orders for this visit:    1. Irritable bowel syndrome with diarrhea (Primary)    2. Personal history of colonic polyps    3. Gastroesophageal reflux disease, unspecified whether esophagitis present    Other orders  -     nitazoxanide (Alinia) 500 MG tablet; Take 1 tablet by mouth 2 (Two) Times a Day With Meals for 3 days.  Dispense: 6 tablet; Refill: 0       Plan    Lili's been having more episodes of IBS since the holidays.  We had a long discussion regarding treatment options.  She was agreeable to Alinia 1 tablet twice daily x3 days and I was able to treat her with samples from the office.  We discussed rotating probiotics to maximize efficacy and continuation of food diary along with IBgard.  She is to follow back in the office if symptoms do not improve to her satisfaction and to discuss other treatment options.  We briefly talked about Pamelor but she is hesitant.         SHANON Alonso  [facility]  [address] [place] [address]  Office: [phone]

## 2023-01-30 ENCOUNTER — TELEPHONE (OUTPATIENT)
Dept: FAMILY MEDICINE CLINIC | Facility: CLINIC | Age: 78
End: 2023-01-30
Payer: MEDICARE

## 2023-01-30 DIAGNOSIS — E55.9 VITAMIN D DEFICIENCY: ICD-10-CM

## 2023-01-30 DIAGNOSIS — E78.00 PURE HYPERCHOLESTEROLEMIA: ICD-10-CM

## 2023-01-30 DIAGNOSIS — I10 PRIMARY HYPERTENSION: Primary | ICD-10-CM

## 2023-02-02 RX ORDER — LISINOPRIL 40 MG/1
40 TABLET ORAL DAILY
Qty: 90 TABLET | Refills: 3 | Status: SHIPPED | OUTPATIENT
Start: 2023-02-02 | End: 2023-02-24 | Stop reason: SDUPTHER

## 2023-02-17 DIAGNOSIS — E78.00 PURE HYPERCHOLESTEROLEMIA: ICD-10-CM

## 2023-02-17 DIAGNOSIS — I10 PRIMARY HYPERTENSION: ICD-10-CM

## 2023-02-17 DIAGNOSIS — E55.9 VITAMIN D DEFICIENCY: ICD-10-CM

## 2023-02-17 LAB
25(OH)D3+25(OH)D2 SERPL-MCNC: 71.1 NG/ML (ref 30–100)
ALBUMIN SERPL-MCNC: 4.2 G/DL (ref 3.5–5.2)
ALBUMIN/GLOB SERPL: 2.3 G/DL
ALP SERPL-CCNC: 56 U/L (ref 39–117)
ALT SERPL-CCNC: 11 U/L (ref 1–33)
AST SERPL-CCNC: 15 U/L (ref 1–32)
BILIRUB SERPL-MCNC: <0.2 MG/DL (ref 0–1.2)
BUN SERPL-MCNC: 22 MG/DL (ref 8–23)
BUN/CREAT SERPL: 30.6 (ref 7–25)
CALCIUM SERPL-MCNC: 10.2 MG/DL (ref 8.6–10.5)
CHLORIDE SERPL-SCNC: 102 MMOL/L (ref 98–107)
CHOLEST SERPL-MCNC: 177 MG/DL (ref 0–200)
CO2 SERPL-SCNC: 33.5 MMOL/L (ref 22–29)
CREAT SERPL-MCNC: 0.72 MG/DL (ref 0.57–1)
EGFRCR SERPLBLD CKD-EPI 2021: 86.8 ML/MIN/1.73
ERYTHROCYTE [DISTWIDTH] IN BLOOD BY AUTOMATED COUNT: 13 % (ref 12.3–15.4)
GLOBULIN SER CALC-MCNC: 1.8 GM/DL
GLUCOSE SERPL-MCNC: 105 MG/DL (ref 65–99)
HCT VFR BLD AUTO: 40 % (ref 34–46.6)
HDLC SERPL-MCNC: 73 MG/DL (ref 40–60)
HGB BLD-MCNC: 12.9 G/DL (ref 12–15.9)
LDLC SERPL CALC-MCNC: 87 MG/DL (ref 0–100)
MCH RBC QN AUTO: 31.5 PG (ref 26.6–33)
MCHC RBC AUTO-ENTMCNC: 32.3 G/DL (ref 31.5–35.7)
MCV RBC AUTO: 97.8 FL (ref 79–97)
PLATELET # BLD AUTO: 191 10*3/MM3 (ref 140–450)
POTASSIUM SERPL-SCNC: 4.2 MMOL/L (ref 3.5–5.2)
PROT SERPL-MCNC: 6 G/DL (ref 6–8.5)
RBC # BLD AUTO: 4.09 10*6/MM3 (ref 3.77–5.28)
SODIUM SERPL-SCNC: 140 MMOL/L (ref 136–145)
TRIGL SERPL-MCNC: 92 MG/DL (ref 0–150)
VLDLC SERPL CALC-MCNC: 17 MG/DL (ref 5–40)
WBC # BLD AUTO: 4.93 10*3/MM3 (ref 3.4–10.8)

## 2023-02-24 ENCOUNTER — OFFICE VISIT (OUTPATIENT)
Dept: FAMILY MEDICINE CLINIC | Facility: CLINIC | Age: 78
End: 2023-02-24
Payer: MEDICARE

## 2023-02-24 VITALS
OXYGEN SATURATION: 98 % | TEMPERATURE: 98.6 F | DIASTOLIC BLOOD PRESSURE: 78 MMHG | RESPIRATION RATE: 18 BRPM | HEIGHT: 64 IN | HEART RATE: 66 BPM | SYSTOLIC BLOOD PRESSURE: 130 MMHG | WEIGHT: 151 LBS | BODY MASS INDEX: 25.78 KG/M2

## 2023-02-24 DIAGNOSIS — I10 PRIMARY HYPERTENSION: ICD-10-CM

## 2023-02-24 DIAGNOSIS — Z00.00 MEDICARE ANNUAL WELLNESS VISIT, SUBSEQUENT: Primary | ICD-10-CM

## 2023-02-24 DIAGNOSIS — I48.0 PAROXYSMAL ATRIAL FIBRILLATION: ICD-10-CM

## 2023-02-24 DIAGNOSIS — E78.00 PURE HYPERCHOLESTEROLEMIA: ICD-10-CM

## 2023-02-24 PROCEDURE — 1159F MED LIST DOCD IN RCRD: CPT | Performed by: INTERNAL MEDICINE

## 2023-02-24 PROCEDURE — 1160F RVW MEDS BY RX/DR IN RCRD: CPT | Performed by: INTERNAL MEDICINE

## 2023-02-24 PROCEDURE — 1125F AMNT PAIN NOTED PAIN PRSNT: CPT | Performed by: INTERNAL MEDICINE

## 2023-02-24 PROCEDURE — 1126F AMNT PAIN NOTED NONE PRSNT: CPT | Performed by: INTERNAL MEDICINE

## 2023-02-24 PROCEDURE — G0439 PPPS, SUBSEQ VISIT: HCPCS | Performed by: INTERNAL MEDICINE

## 2023-02-24 PROCEDURE — 1170F FXNL STATUS ASSESSED: CPT | Performed by: INTERNAL MEDICINE

## 2023-02-24 RX ORDER — TRAMADOL HYDROCHLORIDE 50 MG/1
50 TABLET ORAL EVERY 8 HOURS PRN
Qty: 60 TABLET | Refills: 4 | Status: SHIPPED | OUTPATIENT
Start: 2023-02-24

## 2023-02-24 RX ORDER — OMEPRAZOLE 20 MG/1
20 CAPSULE, DELAYED RELEASE ORAL DAILY
Qty: 30 CAPSULE | Refills: 4 | Status: SHIPPED | OUTPATIENT
Start: 2023-02-24

## 2023-02-24 RX ORDER — PRAVASTATIN SODIUM 20 MG
20 TABLET ORAL DAILY
Qty: 30 TABLET | Refills: 3 | Status: SHIPPED | OUTPATIENT
Start: 2023-02-24

## 2023-02-24 RX ORDER — LISINOPRIL 40 MG/1
40 TABLET ORAL DAILY
Qty: 90 TABLET | Refills: 3 | Status: SHIPPED | OUTPATIENT
Start: 2023-02-24

## 2023-02-24 NOTE — PROGRESS NOTES
QUICK REFERENCE INFORMATION:  The ABCs of the Annual Wellness Visit    Subsequent Medicare Wellness Visit patient was seen for Medicare wellness exam    HEALTH RISK ASSESSMENT    1945    Recent Hospitalizations:  No hospitalization(s) within the last year..        Current Medical Providers:  Patient Care Team:  Luis Davis MD as PCP - General  Luis Davis MD as PCP - Family Medicine  Shamar Caban MD as Consulting Physician (Medical Oncology)  Lee Heredia MD as Surgeon (General Surgery)  Khris Lewis II, MD as Consulting Physician (Interventional Cardiology)        Smoking Status:  Social History     Tobacco Use   Smoking Status Former   Smokeless Tobacco Never   Tobacco Comments    50 yrs ago       Alcohol Consumption:  Social History     Substance and Sexual Activity   Alcohol Use No       Depression Screen:   PHQ-2/PHQ-9 Depression Screening 2/24/2023   Retired PHQ-9 Total Score -   Retired Total Score -   Little Interest or Pleasure in Doing Things 1-->several days   Feeling Down, Depressed or Hopeless 1-->several days   Trouble Falling or Staying Asleep, or Sleeping Too Much 1-->several days   Feeling Tired or Having Little Energy 1-->several days   Poor Appetite or Overeating 0-->not at all   Feeling Bad about Yourself - or that You are a Failure or Have Let Yourself or Your Family Down 0-->not at all   Trouble Concentrating on Things, Such as Reading the Newspaper or Watching Television 0-->not at all   Moving or Speaking So Slowly that Other People Could Have Noticed? Or the Opposite - Being So Fidgety 0-->not at all   Thoughts that You Would be Better Off Dead or of Hurting Yourself in Some Way 0-->not at all   PHQ-9: Brief Depression Severity Measure Score 4   If You Checked Off Any Problems, How Difficult Have These Problems Made It For You to Do Your Work, Take Care of Things at Home, or Get Along with Other People? somewhat difficult       Health Habits and Functional and  Cognitive Screening:  Functional & Cognitive Status 2/24/2023   Do you have difficulty preparing food and eating? No   Do you have difficulty bathing yourself, getting dressed or grooming yourself? No   Do you have difficulty using the toilet? No   Do you have difficulty moving around from place to place? Yes   Do you have trouble with steps or getting out of a bed or a chair? Yes   Current Diet Well Balanced Diet   Dental Exam Up to date   Eye Exam Up to date   Exercise (times per week) 2 times per week   Current Exercises Include Aerobics   Current Exercise Activities Include -   Do you need help using the phone?  No   Are you deaf or do you have serious difficulty hearing?  No   Do you need help with transportation? No   Do you need help shopping? No   Do you need help preparing meals?  No   Do you need help with housework?  No   Do you need help with laundry? No   Do you need help taking your medications? No   Do you need help managing money? No   Do you ever drive or ride in a car without wearing a seat belt? No   Have you felt unusual stress, anger or loneliness in the last month? No   Who do you live with? Spouse   If you need help, do you have trouble finding someone available to you? No   Have you been bothered in the last four weeks by sexual problems? No   Do you have difficulty concentrating, remembering or making decisions? No           Does the patient have evidence of cognitive impairment? No    Aspirin use counseling: Does not need ASA (and currently is not on it)      Recent Lab Results:  CMP:  Lab Results   Component Value Date    GLU 80 03/31/2019    BUN 22 02/17/2023    CREATININE 0.72 02/17/2023    EGFRIFNONA 98 12/07/2021    BCR 30.6 (H) 02/17/2023     02/17/2023    K 4.2 02/17/2023    CO2 33.5 (H) 02/17/2023    CALCIUM 10.2 02/17/2023    PROTENTOTREF 6.0 02/17/2023    ALBUMIN 4.2 02/17/2023    LABGLOBREF 1.8 02/17/2023    LABIL2 2.3 02/17/2023    BILITOT <0.2 02/17/2023    ALKPHOS 56  02/17/2023    AST 15 02/17/2023    ALT 11 02/17/2023     Lipid Panel:  Lab Results   Component Value Date    CHOL 173 06/08/2022    TRIG 92 02/17/2023    HDL 73 (H) 02/17/2023    VLDL 17 02/17/2023    LDLHDL 1.55 06/08/2022     HbA1c:       Visual Acuity:  No results found.    Age-appropriate Screening Schedule:  Refer to the list below for future screening recommendations based on patient's age, sex and/or medical conditions. Orders for these recommended tests are listed in the plan section. The patient has been provided with a written plan.    Health Maintenance   Topic Date Due   • ZOSTER VACCINE (2 of 2) 03/14/2017   • Pneumococcal Vaccine 65+ (2 - PPSV23 if available, else PCV20) 11/01/2017   • DXA SCAN  12/13/2020   • MAMMOGRAM  09/07/2023   • LIPID PANEL  02/17/2024   • ANNUAL WELLNESS VISIT  02/24/2024   • TDAP/TD VACCINES (2 - Td or Tdap) 01/17/2027   • COLORECTAL CANCER SCREENING  03/10/2031   • COVID-19 Vaccine  Completed   • INFLUENZA VACCINE  Completed   • HEPATITIS C SCREENING  Addressed        Subjective   History of Present Illness    Lili LETHA Ruiz is a 78 y.o. female who presents for an Subsequent Wellness Visit.    The following portions of the patient's history were reviewed and updated as appropriate: allergies, current medications, past family history, past medical history, past social history, past surgical history and problem list.    Outpatient Medications Prior to Visit   Medication Sig Dispense Refill   • Calcium Carbonate (CALCIUM 600 PO) Take  by mouth 2 (Two) Times a Day.     • Cholecalciferol (VITAMIN D3) 2000 UNITS tablet Take 1 tablet by mouth Daily.     • flecainide (TAMBOCOR) 100 MG tablet Take 1 tablet by mouth Every 12 (Twelve) Hours. 60 tablet 3   • Probiotic Product (PROBIOTIC DAILY PO) Take  by mouth.     • Unable to find 1 each 1 (One) Time. IB Valerio 2 Tables Daily     • vitamin C (ASCORBIC ACID) 250 MG tablet Take 500 mg by mouth Daily.     • apixaban (ELIQUIS) 5 MG tablet  tablet Take 5 mg by mouth 2 (Two) Times a Day.     • lisinopril (PRINIVIL,ZESTRIL) 40 MG tablet TAKE 1 TABLET BY MOUTH DAILY 90 tablet 3   • omeprazole (priLOSEC) 20 MG capsule Take 20 mg by mouth Daily.     • pravastatin (PRAVACHOL) 20 MG tablet TAKE 1 TABLET BY MOUTH DAILY 30 tablet 3   • traMADol (ULTRAM) 50 MG tablet Take 1 tablet by mouth Every 8 (Eight) Hours As Needed for Moderate Pain  (chronic pain meds for low back pain). 60 tablet 2     No facility-administered medications prior to visit.       Patient Active Problem List   Diagnosis   • Visual impairment   • Peptic ulceration   • Low back pain   • Kidney stone   • Hypertension   • Hyperlipidemia   • Diverticulosis   • Depression   • Cancer (HCC)   • Atrial fibrillation (HCC)   • Arthritis   • Anxiety   • Human metapneumovirus (hMPV) pneumonia   • Paroxysmal supraventricular tachycardia (HCC)   • Esophageal reflux   • Ductal carcinoma in situ (DCIS) of right breast   • Carotid artery stenosis   • Family history of malignant neoplasm of gastrointestinal tract   • Family history of malignant neoplasm of genital organ, other   • Mitral annular calcification   • Chronic anticoagulation   • Dysphagia   • Aortic insufficiency   • Tricuspid regurgitation   • Irritable bowel syndrome with diarrhea       Advance Care Planning:  ACP discussion was held with the patient during this visit. Patient has an advance directive in EMR which is still valid.     Identification of Risk Factors:  Risk factors include: NA.    Review of Systems   Constitutional: Negative for fatigue and fever.   HENT: Positive for congestion. Negative for trouble swallowing.    Eyes: Negative for discharge and visual disturbance.   Respiratory: Negative for choking and shortness of breath.    Cardiovascular: Negative for chest pain and palpitations.   Gastrointestinal: Negative for abdominal pain and blood in stool.   Endocrine: Negative.    Genitourinary: Negative for genital sores and  hematuria.   Musculoskeletal: Negative for gait problem and joint swelling.   Skin: Negative for color change, pallor, rash and wound.   Allergic/Immunologic: Positive for environmental allergies. Negative for immunocompromised state.   Neurological: Negative for facial asymmetry and speech difficulty.   Psychiatric/Behavioral: Negative for hallucinations and suicidal ideas.       Compared to one year ago, the patient feels her physical health is the same.  Compared to one year ago, the patient feels her mental health is worse.    Objective     Physical Exam  Vitals and nursing note reviewed.   Constitutional:       Appearance: Normal appearance. She is well-developed.   HENT:      Head: Normocephalic and atraumatic.      Nose: Nose normal.      Mouth/Throat:      Mouth: Mucous membranes are moist.      Pharynx: Oropharynx is clear.   Eyes:      Extraocular Movements: Extraocular movements intact.      Conjunctiva/sclera: Conjunctivae normal.      Pupils: Pupils are equal, round, and reactive to light.   Cardiovascular:      Rate and Rhythm: Normal rate and regular rhythm.      Heart sounds: Normal heart sounds. No murmur heard.    No friction rub. No gallop.   Pulmonary:      Effort: Pulmonary effort is normal. No respiratory distress.      Breath sounds: Normal breath sounds. No stridor. No wheezing, rhonchi or rales.   Chest:      Chest wall: No tenderness.   Abdominal:      General: Bowel sounds are normal.      Palpations: Abdomen is soft.   Musculoskeletal:         General: Normal range of motion.      Cervical back: Normal range of motion and neck supple.   Skin:     General: Skin is warm and dry.   Neurological:      General: No focal deficit present.      Mental Status: She is alert and oriented to person, place, and time. Mental status is at baseline.   Psychiatric:         Mood and Affect: Mood normal.         Behavior: Behavior normal.         Thought Content: Thought content normal.         Judgment:  "Judgment normal.         Vitals:    02/24/23 0832   BP: 130/78   BP Location: Left arm   Patient Position: Sitting   Cuff Size: Adult   Pulse: 66   Resp: 18   Temp: 98.6 °F (37 °C)   TempSrc: Infrared   SpO2: 98%   Weight: 68.5 kg (151 lb)   Height: 161.3 cm (63.5\")   PainSc: 0-No pain       BMI is >= 25 and <30. (Overweight) The following options were offered after discussion;: NA      Assessment & Plan #1 wellness exam  Patient Self-Management and Personalized Health Advice  The patient has been provided with information about: NA and preventive services including:   · NA.    Visit Diagnoses:    ICD-10-CM ICD-9-CM   1. Medicare annual wellness visit, subsequent  Z00.00 V70.0   2. Primary hypertension  I10 401.9   3. Pure hypercholesterolemia  E78.00 272.0   4. Paroxysmal atrial fibrillation (HCC)  I48.0 427.31       No orders of the defined types were placed in this encounter.      Outpatient Encounter Medications as of 2/24/2023   Medication Sig Dispense Refill   • apixaban (ELIQUIS) 5 MG tablet tablet Take 1 tablet by mouth 2 (Two) Times a Day. 60 tablet 4   • Calcium Carbonate (CALCIUM 600 PO) Take  by mouth 2 (Two) Times a Day.     • Cholecalciferol (VITAMIN D3) 2000 UNITS tablet Take 1 tablet by mouth Daily.     • flecainide (TAMBOCOR) 100 MG tablet Take 1 tablet by mouth Every 12 (Twelve) Hours. 60 tablet 3   • lisinopril (PRINIVIL,ZESTRIL) 40 MG tablet Take 1 tablet by mouth Daily. 90 tablet 3   • omeprazole (priLOSEC) 20 MG capsule Take 1 capsule by mouth Daily. 30 capsule 4   • pravastatin (PRAVACHOL) 20 MG tablet Take 1 tablet by mouth Daily. 30 tablet 3   • Probiotic Product (PROBIOTIC DAILY PO) Take  by mouth.     • traMADol (ULTRAM) 50 MG tablet Take 1 tablet by mouth Every 8 (Eight) Hours As Needed for Moderate Pain (chronic pain meds for low back pain). 60 tablet 4   • Unable to find 1 each 1 (One) Time. IB Valerio 2 Tables Daily     • vitamin C (ASCORBIC ACID) 250 MG tablet Take 500 mg by mouth Daily. "     • [DISCONTINUED] apixaban (ELIQUIS) 5 MG tablet tablet Take 5 mg by mouth 2 (Two) Times a Day.     • [DISCONTINUED] lisinopril (PRINIVIL,ZESTRIL) 40 MG tablet TAKE 1 TABLET BY MOUTH DAILY 90 tablet 3   • [DISCONTINUED] omeprazole (priLOSEC) 20 MG capsule Take 20 mg by mouth Daily.     • [DISCONTINUED] pravastatin (PRAVACHOL) 20 MG tablet TAKE 1 TABLET BY MOUTH DAILY 30 tablet 3   • [DISCONTINUED] traMADol (ULTRAM) 50 MG tablet Take 1 tablet by mouth Every 8 (Eight) Hours As Needed for Moderate Pain  (chronic pain meds for low back pain). 60 tablet 2     No facility-administered encounter medications on file as of 2/24/2023.       Reviewed use of high risk medication in the elderly: not applicable  Reviewed for potential of harmful drug interactions in the elderly: not applicable    Follow Up:  Return in about 6 months (around 8/24/2023), or if symptoms worsen or fail to improve, for Recheck.     An After Visit Summary and PPPS with all of these plans were given to the patient.

## 2023-02-24 NOTE — PATIENT INSTRUCTIONS
Medicare Wellness  Personal Prevention Plan of Service     Date of Office Visit:    Encounter Provider:  Luis Davis MD  Place of Service:  [facility]  Patient Name: Lili Ruiz  :  1945    As part of the Medicare Wellness portion of your visit today, we are providing you with this personalized preventive plan of services (PPPS). This plan is based upon recommendations of the [place] Preventive Services Task Force (USPSTF) and the Advisory Committee on Immunization Practices (ACIP).    This lists the preventive care services that should be considered, and provides dates of when you are due. Items listed as completed are up-to-date and do not require any further intervention.    Health Maintenance   Topic Date Due    ZOSTER VACCINE (2 of 2) 2017    Pneumococcal Vaccine 65+ (2 - PPSV23 if available, else PCV20) 2017    DXA SCAN  2020    MAMMOGRAM  2023    LIPID PANEL  2024    ANNUAL WELLNESS VISIT  2024    TDAP/TD VACCINES (2 - Td or Tdap) 2027    COLORECTAL CANCER SCREENING  03/10/2031    COVID-19 Vaccine  Completed    INFLUENZA VACCINE  Completed    HEPATITIS C SCREENING  Addressed       No orders of the defined types were placed in this encounter.      No follow-ups on file.

## 2023-08-15 ENCOUNTER — TRANSCRIBE ORDERS (OUTPATIENT)
Dept: ADMINISTRATIVE | Facility: HOSPITAL | Age: 78
End: 2023-08-15
Payer: MEDICARE

## 2023-08-15 DIAGNOSIS — Z12.31 SCREENING MAMMOGRAM FOR BREAST CANCER: Primary | ICD-10-CM

## 2023-10-09 ENCOUNTER — HOSPITAL ENCOUNTER (OUTPATIENT)
Dept: MAMMOGRAPHY | Facility: HOSPITAL | Age: 78
Discharge: HOME | End: 2023-10-09
Admitting: INTERNAL MEDICINE
Payer: MEDICARE

## 2023-10-09 DIAGNOSIS — Z12.31 SCREENING MAMMOGRAM FOR BREAST CANCER: ICD-10-CM

## 2023-10-09 PROCEDURE — 77067 SCR MAMMO BI INCL CAD: CPT

## 2023-10-09 PROCEDURE — 77063 BREAST TOMOSYNTHESIS BI: CPT

## 2023-10-11 ENCOUNTER — OFFICE VISIT (OUTPATIENT)
Dept: GASTROENTEROLOGY | Facility: CLINIC | Age: 78
End: 2023-10-11
Payer: MEDICARE

## 2023-10-11 VITALS
TEMPERATURE: 98.4 F | HEIGHT: 63 IN | WEIGHT: 172 LBS | DIASTOLIC BLOOD PRESSURE: 70 MMHG | BODY MASS INDEX: 30.48 KG/M2 | HEART RATE: 77 BPM | SYSTOLIC BLOOD PRESSURE: 115 MMHG

## 2023-10-11 DIAGNOSIS — K21.9 GASTROESOPHAGEAL REFLUX DISEASE, UNSPECIFIED WHETHER ESOPHAGITIS PRESENT: ICD-10-CM

## 2023-10-11 DIAGNOSIS — Z86.010 PERSONAL HISTORY OF COLONIC POLYPS: ICD-10-CM

## 2023-10-11 DIAGNOSIS — K58.0 IRRITABLE BOWEL SYNDROME WITH DIARRHEA: Primary | ICD-10-CM

## 2023-10-11 DIAGNOSIS — R68.89 OTHER GENERAL SYMPTOMS AND SIGNS: ICD-10-CM

## 2023-10-11 PROCEDURE — 3074F SYST BP LT 130 MM HG: CPT | Performed by: NURSE PRACTITIONER

## 2023-10-11 PROCEDURE — 3078F DIAST BP <80 MM HG: CPT | Performed by: NURSE PRACTITIONER

## 2023-10-11 PROCEDURE — 1160F RVW MEDS BY RX/DR IN RCRD: CPT | Performed by: NURSE PRACTITIONER

## 2023-10-11 PROCEDURE — 99214 OFFICE O/P EST MOD 30 MIN: CPT | Performed by: NURSE PRACTITIONER

## 2023-10-11 PROCEDURE — 1159F MED LIST DOCD IN RCRD: CPT | Performed by: NURSE PRACTITIONER

## 2023-10-11 RX ORDER — NITAZOXANIDE 500 MG/1
500 TABLET ORAL 2 TIMES DAILY WITH MEALS
Qty: 6 TABLET | Refills: 0 | Status: SHIPPED | OUTPATIENT
Start: 2023-10-11 | End: 2023-10-15 | Stop reason: HOSPADM

## 2023-10-11 NOTE — Clinical Note
This patient's last colonoscopy was 2021.  She had a mix of hyperplastic and tubular adenomas.  We have been following her for irritable bowel syndrome.  When would you like her to have another colonoscopy.. 2026?

## 2023-10-11 NOTE — PROGRESS NOTES
Chief Complaint   Patient presents with    Irritable Bowel Syndrome    Heartburn       HPI    Lili Ruiz is a  78 y.o. female here for a follow up visit for IBS-D.     This patient follows with Dr. Galan, known to me.    Past GI medical history pertinent for Schatzki's ring, colon polyps, esophagitis along with internal hemorrhoids.     Patient reports an increase in IBS symptoms over the last several months worse during the month of September.  She cites increased gas and bloating with episodic diarrhea despite rotating probiotics every 6 months and taking up to 6 tablets a day of IBgard.  Some improvement over the last several weeks as she has been passing formed stool.  Denies abdominal pain, rectal pain or rectal bleeding.  She was treated with Alinia following last office visit 1 year ago which she found very helpful.  She has historically been hesitant to treat IBS more aggressively with antispasmodics or bile acid sequestrant's.    She takes low-dose omeprazole daily to manage heartburn.  She denies dysphagia, odynophagia, nausea or vomiting.  Appetite is good.  Her weight fluctuates depending on dietary indiscretions.  No recent weight loss.    Additional data reviewed:    EGD 2021 - Nonobstructing Schatzki's ring, submucosal papule found in stomach, normal duodenum.    C-scope 2021 - Normal ileum, polyps, nonbleeding internal hemorrhoids.    Past Medical History:   Diagnosis Date    Anxiety     Aortic valve insufficiency     Arthritis     Atrial fibrillation     Breast cancer     Cancer     Breast    Cataract     bilateral eyes    Depression     Diverticulosis     Human metapneumovirus (hMPV) pneumonia     Hyperlipidemia     Hypertension     Kidney stone     Low back pain     Obesity     Peptic ulceration     Visual impairment        Past Surgical History:   Procedure Laterality Date    APPENDECTOMY      BREAST BIOPSY      BREAST SURGERY      CHOLECYSTECTOMY      COLONOSCOPY  appox 2014     COLONOSCOPY W/ POLYPECTOMY N/A 3/10/2021    Procedure: COLONOSCOPY  TO CECUM WITH BIOPSY AND POLYPECTOMY (COLD SNARE);  Surgeon: Joel Galan MD;  Location: [facility] ENDOSCOPY;  Service: Gastroenterology;  Laterality: N/A;   DIARRHEA  POST: COLON POLYPS , HEMORRHOIDS  (COLD SNARE SUPPLY SAMPLE USED)    ENDOSCOPY N/A 3/10/2021    Procedure: ESOPHAGOGASTRODUODENOSCOPY WITH BIOPSY;  Surgeon: Joel Galan MD;  Location: [facility] ENDOSCOPY;  Service: Gastroenterology;  Laterality: N/A;  DYSPHAGIA; DIARRHEA  POST: SCHIATZKI'S RING; GASTRIC NODULE    GASTRIC BYPASS      MASTECTOMY         Scheduled Meds:     Continuous Infusions: No current facility-administered medications for this visit.      PRN Meds:     Allergies   Allergen Reactions    Penicillins Shortness Of Breath and Itching    Hydrocodone-Acetaminophen     Sotalol Hcl        Social History     Socioeconomic History    Marital status:    Tobacco Use    Smoking status: Former    Smokeless tobacco: Never    Tobacco comments:     50 yrs ago   Substance and Sexual Activity    Alcohol use: No    Drug use: Never       Family History   Problem Relation Age of Onset    Cancer Mother     Ovarian cancer Mother     Heart disease Father     Colon cancer Maternal Aunt        Review of Systems   Gastrointestinal:  Positive for diarrhea.       Vitals:    10/11/23 1301   BP: 115/70   Pulse: 77   Temp: 98.4 øF (36.9 øC)       Physical Exam  Constitutional:       Appearance: She is well-developed.   Abdominal:      General: Bowel sounds are normal. There is no distension.      Palpations: Abdomen is soft. There is no mass.      Tenderness: There is no abdominal tenderness. There is no guarding.      Hernia: No hernia is present.   Skin:     General: Skin is warm and dry.      Capillary Refill: Capillary refill takes less than 2 seconds.   Neurological:      Mental Status: She is alert and oriented to person, place, and time.   Psychiatric:         Behavior:  Behavior normal.     Assessment    Diagnoses and all orders for this visit:    Irritable bowel syndrome with diarrhea (Primary)  -     CBC (No Diff)  -     Comprehensive Metabolic Panel  -     TSH    Gastroesophageal reflux disease, unspecified whether esophagitis present    Personal history of colonic polyps    Other orders  -     nitazoxanide (Alinia) 500 MG tablet; Take 1 tablet by mouth 2 (Two) Times a Day With Meals for 3 days.  Dispense: 6 tablet; Refill: 0       Plan    Recommend retreatment with 3 days of twice daily dosing Alinia to improve IBS-D  Continue IBgard  We discussed rotating probiotics every 6 months to maximize efficacy  Labs as above  Offered in office follow-up but patient prefers to update me either via phone call or MyChart on symptoms in 2 weeks  Consider nightly Pamelor if needed         SHANON Alonso  [facility]  [address] [place] [address]  Office: [phone]

## 2023-10-13 ENCOUNTER — HOSPITAL ENCOUNTER (INPATIENT)
Facility: HOSPITAL | Age: 78
LOS: 2 days | Discharge: HOME | DRG: 281 | End: 2023-10-15
Attending: EMERGENCY MEDICINE | Admitting: INTERNAL MEDICINE
Payer: MEDICARE

## 2023-10-13 ENCOUNTER — APPOINTMENT (OUTPATIENT)
Dept: GENERAL RADIOLOGY | Facility: HOSPITAL | Age: 78
DRG: 281 | End: 2023-10-13
Payer: MEDICARE

## 2023-10-13 DIAGNOSIS — I21.4 NSTEMI (NON-ST ELEVATED MYOCARDIAL INFARCTION): Primary | ICD-10-CM

## 2023-10-13 LAB
ALBUMIN SERPL-MCNC: 4.3 G/DL (ref 3.5–5.2)
ALBUMIN/GLOB SERPL: 1.8 G/DL
ALP SERPL-CCNC: 52 U/L (ref 39–117)
ALT SERPL W P-5'-P-CCNC: 17 U/L (ref 1–33)
ANION GAP SERPL CALCULATED.3IONS-SCNC: 10.7 MMOL/L (ref 5–15)
APTT PPP: 30.8 SECONDS (ref 22.7–35.4)
AST SERPL-CCNC: 19 U/L (ref 1–32)
BASOPHILS # BLD AUTO: 0.01 10*3/MM3 (ref 0–0.2)
BASOPHILS NFR BLD AUTO: 0.2 % (ref 0–1.5)
BILIRUB SERPL-MCNC: 0.6 MG/DL (ref 0–1.2)
BUN SERPL-MCNC: 15 MG/DL (ref 8–23)
BUN/CREAT SERPL: 19.5 (ref 7–25)
CALCIUM SPEC-SCNC: 9.9 MG/DL (ref 8.6–10.5)
CHLORIDE SERPL-SCNC: 106 MMOL/L (ref 98–107)
CO2 SERPL-SCNC: 27.3 MMOL/L (ref 22–29)
CREAT SERPL-MCNC: 0.77 MG/DL (ref 0.57–1)
DEPRECATED RDW RBC AUTO: 44.8 FL (ref 37–54)
EGFRCR SERPLBLD CKD-EPI 2021: 80.1 ML/MIN/1.73
EOSINOPHIL # BLD AUTO: 0.07 10*3/MM3 (ref 0–0.4)
EOSINOPHIL NFR BLD AUTO: 1.4 % (ref 0.3–6.2)
ERYTHROCYTE [DISTWIDTH] IN BLOOD BY AUTOMATED COUNT: 13 % (ref 12.3–15.4)
GEN 5 2HR TROPONIN T REFLEX: 465 NG/L
GLOBULIN UR ELPH-MCNC: 2.4 GM/DL
GLUCOSE SERPL-MCNC: 95 MG/DL (ref 65–99)
HCT VFR BLD AUTO: 43.3 % (ref 34–46.6)
HGB BLD-MCNC: 14 G/DL (ref 12–15.9)
HOLD SPECIMEN: NORMAL
IMM GRANULOCYTES # BLD AUTO: 0.01 10*3/MM3 (ref 0–0.05)
IMM GRANULOCYTES NFR BLD AUTO: 0.2 % (ref 0–0.5)
INR PPP: 1.14 (ref 0.9–1.1)
LYMPHOCYTES # BLD AUTO: 2.11 10*3/MM3 (ref 0.7–3.1)
LYMPHOCYTES NFR BLD AUTO: 42 % (ref 19.6–45.3)
MCH RBC QN AUTO: 30.5 PG (ref 26.6–33)
MCHC RBC AUTO-ENTMCNC: 32.3 G/DL (ref 31.5–35.7)
MCV RBC AUTO: 94.3 FL (ref 79–97)
MONOCYTES # BLD AUTO: 0.41 10*3/MM3 (ref 0.1–0.9)
MONOCYTES NFR BLD AUTO: 8.2 % (ref 5–12)
NEUTROPHILS NFR BLD AUTO: 2.41 10*3/MM3 (ref 1.7–7)
NEUTROPHILS NFR BLD AUTO: 48 % (ref 42.7–76)
NRBC BLD AUTO-RTO: 0 /100 WBC (ref 0–0.2)
PLATELET # BLD AUTO: 180 10*3/MM3 (ref 140–450)
PMV BLD AUTO: 10.1 FL (ref 6–12)
POTASSIUM SERPL-SCNC: 3.4 MMOL/L (ref 3.5–5.2)
PROT SERPL-MCNC: 6.7 G/DL (ref 6–8.5)
PROTHROMBIN TIME: 14.8 SECONDS (ref 11.7–14.2)
QT INTERVAL: 396 MS
QTC INTERVAL: 490 MS
RBC # BLD AUTO: 4.59 10*6/MM3 (ref 3.77–5.28)
SODIUM SERPL-SCNC: 144 MMOL/L (ref 136–145)
TROPONIN T DELTA: 429 NG/L
TROPONIN T SERPL HS-MCNC: 36 NG/L
WBC NRBC COR # BLD: 5.02 10*3/MM3 (ref 3.4–10.8)
WHOLE BLOOD HOLD COAG: NORMAL
WHOLE BLOOD HOLD SPECIMEN: NORMAL

## 2023-10-13 PROCEDURE — 93005 ELECTROCARDIOGRAM TRACING: CPT | Performed by: EMERGENCY MEDICINE

## 2023-10-13 PROCEDURE — 71045 X-RAY EXAM CHEST 1 VIEW: CPT

## 2023-10-13 PROCEDURE — 85025 COMPLETE CBC W/AUTO DIFF WBC: CPT | Performed by: EMERGENCY MEDICINE

## 2023-10-13 PROCEDURE — 93010 ELECTROCARDIOGRAM REPORT: CPT | Performed by: INTERNAL MEDICINE

## 2023-10-13 PROCEDURE — 84484 ASSAY OF TROPONIN QUANT: CPT | Performed by: EMERGENCY MEDICINE

## 2023-10-13 PROCEDURE — 36415 COLL VENOUS BLD VENIPUNCTURE: CPT

## 2023-10-13 PROCEDURE — 85730 THROMBOPLASTIN TIME PARTIAL: CPT | Performed by: EMERGENCY MEDICINE

## 2023-10-13 PROCEDURE — 93005 ELECTROCARDIOGRAM TRACING: CPT

## 2023-10-13 PROCEDURE — 85610 PROTHROMBIN TIME: CPT | Performed by: EMERGENCY MEDICINE

## 2023-10-13 PROCEDURE — 99285 EMERGENCY DEPT VISIT HI MDM: CPT

## 2023-10-13 PROCEDURE — 80053 COMPREHEN METABOLIC PANEL: CPT | Performed by: EMERGENCY MEDICINE

## 2023-10-13 PROCEDURE — 99223 1ST HOSP IP/OBS HIGH 75: CPT | Performed by: INTERNAL MEDICINE

## 2023-10-13 RX ORDER — PRAVASTATIN SODIUM 20 MG
20 TABLET ORAL NIGHTLY
Status: DISCONTINUED | OUTPATIENT
Start: 2023-10-13 | End: 2023-10-15 | Stop reason: HOSPADM

## 2023-10-13 RX ORDER — POTASSIUM CHLORIDE 750 MG/1
40 TABLET, FILM COATED, EXTENDED RELEASE ORAL EVERY 4 HOURS
Qty: 8 TABLET | Refills: 0 | Status: DISPENSED | OUTPATIENT
Start: 2023-10-13 | End: 2023-10-14

## 2023-10-13 RX ORDER — NITROGLYCERIN 0.4 MG/1
0.4 TABLET SUBLINGUAL
Status: DISCONTINUED | OUTPATIENT
Start: 2023-10-13 | End: 2023-10-14 | Stop reason: ALTCHOICE

## 2023-10-13 RX ORDER — SODIUM CHLORIDE 0.9 % (FLUSH) 0.9 %
10 SYRINGE (ML) INJECTION AS NEEDED
Status: DISCONTINUED | OUTPATIENT
Start: 2023-10-13 | End: 2023-10-15 | Stop reason: HOSPADM

## 2023-10-13 RX ORDER — NITAZOXANIDE 500 MG/1
500 TABLET ORAL 2 TIMES DAILY WITH MEALS
Qty: 2 TABLET | Refills: 0 | Status: DISPENSED | OUTPATIENT
Start: 2023-10-13 | End: 2023-10-14

## 2023-10-13 RX ORDER — ASPIRIN 81 MG/1
324 TABLET, CHEWABLE ORAL ONCE
Status: COMPLETED | OUTPATIENT
Start: 2023-10-13 | End: 2023-10-13

## 2023-10-13 RX ORDER — LISINOPRIL 40 MG/1
40 TABLET ORAL DAILY
Status: DISCONTINUED | OUTPATIENT
Start: 2023-10-13 | End: 2023-10-15 | Stop reason: HOSPADM

## 2023-10-13 RX ORDER — PANTOPRAZOLE SODIUM 40 MG/1
40 TABLET, DELAYED RELEASE ORAL
Status: DISCONTINUED | OUTPATIENT
Start: 2023-10-14 | End: 2023-10-15 | Stop reason: HOSPADM

## 2023-10-13 RX ORDER — ASPIRIN 325 MG
325 TABLET ORAL ONCE
Status: DISCONTINUED | OUTPATIENT
Start: 2023-10-13 | End: 2023-10-13

## 2023-10-13 RX ORDER — ASPIRIN 81 MG/1
81 TABLET ORAL DAILY
Status: DISCONTINUED | OUTPATIENT
Start: 2023-10-14 | End: 2023-10-15 | Stop reason: HOSPADM

## 2023-10-13 RX ADMIN — METOPROLOL TARTRATE 25 MG: 25 TABLET, FILM COATED ORAL at 21:40

## 2023-10-13 RX ADMIN — ASPIRIN 81 MG CHEWABLE TABLET 324 MG: 81 TABLET CHEWABLE at 10:28

## 2023-10-13 RX ADMIN — PRAVASTATIN SODIUM 20 MG: 20 TABLET ORAL at 21:40

## 2023-10-13 RX ADMIN — POTASSIUM CHLORIDE 40 MEQ: 750 TABLET, EXTENDED RELEASE ORAL at 21:40

## 2023-10-13 RX ADMIN — NITROGLYCERIN 0.4 MG: 0.4 TABLET SUBLINGUAL at 10:29

## 2023-10-13 NOTE — PROGRESS NOTES
Clinical Pharmacy Services: Medication History    Lili Ruiz is a 78 y.o. female presenting to [facility] for   Chief Complaint   Patient presents with    Chest Pain       She  has a past medical history of Anxiety, Aortic valve insufficiency, Arthritis, Atrial fibrillation, Breast cancer, Cancer, Cataract, Depression, Diverticulosis, Human metapneumovirus (hMPV) pneumonia, Hyperlipidemia, Hypertension, Kidney stone, Low back pain, Obesity, Peptic ulceration, and Visual impairment.    Allergies as of 10/13/2023 - Reviewed 10/13/2023   Allergen Reaction Noted    Penicillins Shortness Of Breath and Itching 01/25/2021    Hydrocodone-acetaminophen  07/09/2016    Sotalol hcl  02/01/2013       Medication information was obtained from: Pharmacy  Pharmacy and Phone Number: [email or web address] [phone]    Prior to Admission Medications       Prescriptions Last Dose Informant Patient Reported? Taking?    apixaban (ELIQUIS) 5 MG tablet tablet  Pharmacy No Yes    Take 1 tablet by mouth 2 (Two) Times a Day.    flecainide (TAMBOCOR) 100 MG tablet  Pharmacy No Yes    Take 1 tablet by mouth Every 12 (Twelve) Hours.    lisinopril (PRINIVIL,ZESTRIL) 40 MG tablet  Pharmacy No Yes    Take 1 tablet by mouth Daily.    nitazoxanide (Alinia) 500 MG tablet  Pharmacy No Yes    Take 1 tablet by mouth 2 (Two) Times a Day With Meals for 3 days.    omeprazole (priLOSEC) 20 MG capsule  Pharmacy No Yes    Take 1 capsule by mouth Daily.    pravastatin (PRAVACHOL) 20 MG tablet  Pharmacy No Yes    Take 1 tablet by mouth Daily.    Probiotic Product (PROBIOTIC DAILY PO)  Self Yes Yes    Take 1 tablet by mouth 2 (Two) Times a Day.    Calcium Carbonate (CALCIUM 600 PO)   Yes No    Take  by mouth 2 (Two) Times a Day.    Cholecalciferol (VITAMIN D3) 2000 UNITS tablet   Yes No    Take 1 tablet by mouth Daily.    traMADol (ULTRAM) 50 MG tablet   No No    Take 1 tablet by mouth Every 8 (Eight) Hours As Needed for Moderate Pain (chronic pain meds  for low back pain).    Unable to find   Yes No    1 each 1 (One) Time. IB Valerio 2 Tables Daily    vitamin C (ASCORBIC ACID) 250 MG tablet   Yes No    Take 2 tablets by mouth Daily.              Medication notes: Patient was not able to tell me what all she takes called pharmacy for medications.    This medication list is complete to the best of my knowledge as of 10/13/2023    Please call if questions.    Janay Moore  Medication History Technician   [phone]    10/13/2023 14:20 EDT

## 2023-10-13 NOTE — ED NOTES
Patient to ER via car from [facility] for chest pain going through to back x 20 minutes

## 2023-10-13 NOTE — H&P
Date of Hospital Visit: 10/13/23   Encounter Provider: Sharad Capps MD  Place of Service: [facility] CARDIOLOGY  Patient Name: Lili Ruiz  :1945        Chief complaint  NSTEMI  Chest pain  LBBB    History of Present Illness  Very pleasant 78-year-old female with a medical history of paroxysmal atrial fibrillation, mild aortic valve stenosis, essential hypertension, chronic direct oral anticoagulant use who presents secondary to the acute onset of central chest discomfort that radiated to her back.  This was reportedly severe in intensity and central.  She was noted to be mildly hypertensive and had mild tachycardia upon arrival.  She received 1 sublingual nitroglycerin with resolution of her chest pain.  Her initial high-sensitivity troponin was 36.  Repeat troponin was documented to be significantly elevated at 465.  She also had mild hypokalemia documented on lab work.    Past Medical History:   Diagnosis Date    Anxiety     Aortic valve insufficiency     Arthritis     Atrial fibrillation     Breast cancer     Cancer     Breast    Cataract     bilateral eyes    Depression     Diverticulosis     Human metapneumovirus (hMPV) pneumonia     Hyperlipidemia     Hypertension     Kidney stone     Low back pain     Obesity     Peptic ulceration     Visual impairment        Past Surgical History:   Procedure Laterality Date    APPENDECTOMY      BREAST BIOPSY      BREAST SURGERY      CHOLECYSTECTOMY      COLONOSCOPY  appox     COLONOSCOPY W/ POLYPECTOMY N/A 3/10/2021    Procedure: COLONOSCOPY  TO CECUM WITH BIOPSY AND POLYPECTOMY (COLD SNARE);  Surgeon: Joel Galan MD;  Location: [facility] ENDOSCOPY;  Service: Gastroenterology;  Laterality: N/A;   DIARRHEA  POST: COLON POLYPS , HEMORRHOIDS  (COLD SNARE SUPPLY SAMPLE USED)    ENDOSCOPY N/A 3/10/2021    Procedure: ESOPHAGOGASTRODUODENOSCOPY WITH BIOPSY;  Surgeon: Joel Galan MD;  Location: [facility]  "ENDOSCOPY;  Service: Gastroenterology;  Laterality: N/A;  DYSPHAGIA; DIARRHEA  POST: SCHIATZKI'S RING; GASTRIC NODULE    GASTRIC BYPASS      MASTECTOMY         (Not in a hospital admission)      Current Meds  Scheduled Meds:aspirin, 81 mg, Oral, Daily  lisinopril, 40 mg, Oral, Daily  metoprolol tartrate, 25 mg, Oral, Q12H  nitazoxanide, 500 mg, Oral, BID With Meals  [START ON 10/14/2023] pantoprazole, 40 mg, Oral, Q AM  pravastatin, 20 mg, Oral, Daily      Continuous Infusions:   PRN Meds:.  nitroglycerin    sodium chloride    sodium chloride    Allergies as of 10/13/2023 - Reviewed 10/13/2023   Allergen Reaction Noted    Penicillins Shortness Of Breath and Itching 01/25/2021    Hydrocodone-acetaminophen  07/09/2016    Sotalol hcl  02/01/2013       Social History     Socioeconomic History    Marital status:    Tobacco Use    Smoking status: Former    Smokeless tobacco: Never    Tobacco comments:     50 yrs ago   Substance and Sexual Activity    Alcohol use: No    Drug use: Never       Family History   Problem Relation Age of Onset    Cancer Mother     Ovarian cancer Mother     Heart disease Father     Colon cancer Maternal Aunt        REVIEW OF SYSTEMS:   12 point ROS was performed and is negative except as outlined in HPI        Objective:   Temp:  [97.6 °F (36.4 °C)] 97.6 °F (36.4 °C)  Heart Rate:  [] 72  Resp:  [18] 18  BP: (120-146)/(72-87) 130/87  Body mass index is 30.11 kg/m².  Flowsheet Rows      Flowsheet Row First Filed Value   Admission Height 160 cm (63\") Documented at 10/13/2023 1028   Admission Weight 77.1 kg (170 lb) Documented at 10/13/2023 1028          Vitals:    10/13/23 1531   BP: 130/87   Pulse: 72   Resp:    Temp:    SpO2: 98%       Temp:  [97.6 °F (36.4 °C)] 97.6 °F (36.4 °C)  Heart Rate:  [] 72  Resp:  [18] 18  BP: (120-146)/(72-87) 130/87  No intake or output data in the 24 hours ending 10/13/23 1550  Flowsheet Rows      Flowsheet Row First Filed Value   Admission Height " "160 cm (63\") Documented at 10/13/2023 1028   Admission Weight 77.1 kg (170 lb) Documented at 10/13/2023 1028            General Appearance:    Alert, cooperative, in no acute distress   Head:    Normocephalic, without obvious abnormality, atraumatic       Neck/Lymph   No adenopathy, supple, no thyromegaly, no carotid bruit, no    JVD   Lungs:     Clear to auscultation bilaterally, no wheezes, rales, or     rhonchi    Cardiac:    Normal rate, regular rhythm, II/VI systolic murmur peak early in systole, no rub, no gallop   Chest Wall:    No abnormalities observed   GI:     Normal bowel sounds, soft, nontender, nondistended,            no rebound tenderness   Extremities:   No cyanosis, clubbing, or edema   Circulatory/Peripheral Vascular :   Pulses palpable and equal bilaterally   Integumentary:   No bleeding or rash. Normal temperature                      Lab Review:      Results from last 7 days   Lab Units 10/13/23  1025   SODIUM mmol/L 144   POTASSIUM mmol/L 3.4*   CHLORIDE mmol/L 106   CO2 mmol/L 27.3   BUN mg/dL 15   CREATININE mg/dL 0.77   CALCIUM mg/dL 9.9   BILIRUBIN mg/dL 0.6   ALK PHOS U/L 52   ALT (SGPT) U/L 17   AST (SGOT) U/L 19   GLUCOSE mg/dL 95     Results from last 7 days   Lab Units 10/13/23  1305 10/13/23  1025   HSTROP T ng/L 465* 36*     @LABRCNTbnp@  Results from last 7 days   Lab Units 10/13/23  1025   WBC 10*3/mm3 5.02   HEMOGLOBIN g/dL 14.0   HEMATOCRIT % 43.3   PLATELETS 10*3/mm3 180     Results from last 7 days   Lab Units 10/13/23  1025   INR  1.14*   APTT seconds 30.8         @LABRCNTIP(chol,trig,hdl,ldl)    I personally viewed and interpreted the patient's EKG/Telemetry data    7/18/23  The ejection fraction biplane was calculated at 55%.                            Globally normal left ventricular systolic function.                            Impaired relaxation compatible with diastolic dysfunction.                            Left ventricular size is normal.                            " Normal left ventricular wall thickness.                            The right ventricular chamber size and systolic function are within normal limits.                            Mild aortic stenosis is present.     Assessment and Plan:  1.  Non-ST elevation MI  2.  Chronic left bundle branch block  3.  Paroxysmal atrial fibrillation  4.  Mild aortic valve stenosis    -Patient did receive Eliquis this morning and is currently chest pain-free.  - Plan on holding Eliquis tonight and tomorrow morning and consider catheterization tomorrow.  - Aspirin 81 mg p.o. daily after 325 dose downstairs.  - Metoprolol tartrate 25 mg p.o. every 12 hours started.  Flecainide stopped in the setting of left bundle branch block at baseline but also now non-ST elevation MI    Sharad Capps MD  10/13/23  15:48 EDT.

## 2023-10-13 NOTE — Clinical Note
Hemostasis started on the right radial artery. R-Band was used in achieving hemostasis. Radial compression device applied to vessel. Hemostasis achieved successfully. Closure device additional comment: 12 cc of air

## 2023-10-13 NOTE — ED PROVIDER NOTES
EMERGENCY DEPARTMENT ENCOUNTER  Room Number:  33/33  Date of encounter:  10/13/2023  PCP: Luis Davis MD  Patient Care Team:  Luis Davis MD as PCP - General  Luis Davis MD as PCP - Family Medicine  Shamar Caban MD as Consulting Physician (Medical Oncology)  Lee Heredia MD as Surgeon (General Surgery)  Khris Lewis II, MD as Consulting Physician (Interventional Cardiology)     HPI:  Context: Lili Ruiz is a 78 y.o. female who presents to the ED c/o chief complaint of chest pain.  Patient complains of chest pain that began shortly before arrival, patient complains of chest pressure, well localized, radiates through to her back.  Patient denies any radiation to her neck or extremities.  Patient does endorse some associated shortness of breath, no nausea vomiting or diaphoresis.  Patient reports that she was at baseline health prior to onset of pain, denies any abdominal pain, eating drinking normally, no cough or upper respiratory symptoms.    MEDICAL HISTORY REVIEW  Reviewed in EPIC    PAST MEDICAL HISTORY  Active Ambulatory Problems     Diagnosis Date Noted    Visual impairment     Peptic ulceration     Low back pain     Kidney stone     Hypertension     Hyperlipidemia     Diverticulosis     Depression     Cancer     Atrial fibrillation     Arthritis     Anxiety     Human metapneumovirus (hMPV) pneumonia     Paroxysmal supraventricular tachycardia 10/03/2017    Esophageal reflux 03/25/2013    Ductal carcinoma in situ (DCIS) of right breast 05/29/2015    Carotid artery stenosis 10/03/2017    Family history of malignant neoplasm of gastrointestinal tract 03/25/2013    Family history of malignant neoplasm of genital organ, other 03/25/2013    Mitral annular calcification 11/04/2019    Chronic anticoagulation 05/04/2020    Dysphagia 01/25/2021    Aortic insufficiency 12/07/2021    Tricuspid regurgitation 06/08/2022    Irritable bowel syndrome with diarrhea 10/12/2022     Resolved  Ambulatory Problems     Diagnosis Date Noted    Obesity     Functional diarrhea 01/25/2021     Past Medical History:   Diagnosis Date    Aortic valve insufficiency     Breast cancer     Cataract        PAST SURGICAL HISTORY  Past Surgical History:   Procedure Laterality Date    APPENDECTOMY      BREAST BIOPSY      BREAST SURGERY      CHOLECYSTECTOMY      COLONOSCOPY  appox 2014    COLONOSCOPY W/ POLYPECTOMY N/A 3/10/2021    Procedure: COLONOSCOPY  TO CECUM WITH BIOPSY AND POLYPECTOMY (COLD SNARE);  Surgeon: Joel Galan MD;  Location: [facility] ENDOSCOPY;  Service: Gastroenterology;  Laterality: N/A;   DIARRHEA  POST: COLON POLYPS , HEMORRHOIDS  (COLD SNARE SUPPLY SAMPLE USED)    ENDOSCOPY N/A 3/10/2021    Procedure: ESOPHAGOGASTRODUODENOSCOPY WITH BIOPSY;  Surgeon: Joel Galan MD;  Location:  [facility] ENDOSCOPY;  Service: Gastroenterology;  Laterality: N/A;  DYSPHAGIA; DIARRHEA  POST: SCHIATZKI'S RING; GASTRIC NODULE    GASTRIC BYPASS      MASTECTOMY         FAMILY HISTORY  Family History   Problem Relation Age of Onset    Cancer Mother     Ovarian cancer Mother     Heart disease Father     Colon cancer Maternal Aunt        SOCIAL HISTORY  Social History     Socioeconomic History    Marital status:    Tobacco Use    Smoking status: Former    Smokeless tobacco: Never    Tobacco comments:     50 yrs ago   Substance and Sexual Activity    Alcohol use: No    Drug use: Never       ALLERGIES  Penicillins, Hydrocodone-acetaminophen, and Sotalol hcl    The patient's allergies have been reviewed    REVIEW OF SYSTEMS  All systems reviewed and negative except for those discussed in HPI.     PHYSICAL EXAM  I have reviewed the triage vital signs and nursing notes.  ED Triage Vitals   Temp Heart Rate Resp BP SpO2   10/13/23 1028 10/13/23 1017 10/13/23 1017 10/13/23 1028 10/13/23 1017   97.6 °F (36.4 °C) 105 18 140/76 98 %      Temp src Heart Rate Source Patient Position BP Location FiO2 (%)   10/13/23 1028  -- 10/13/23 1028 -- --   Tympanic  Lying         General: No acute distress.  HENT: NCAT, PERRL, Nares patent.  Eyes: no scleral icterus.  Neck: trachea midline, no ROM limitations.  CV: regular rhythm, regular rate.  Respiratory: normal effort, CTAB.  Abdomen: soft, nondistended, NTTP, no rebound tenderness, no guarding or rigidity.  Musculoskeletal: no deformity.  Neuro: alert, moves all extremities, follows commands.  Skin: warm, dry.    LAB RESULTS  Recent Results (from the past 24 hour(s))   ECG 12 Lead ED Triage Standing Order; Chest Pain    Collection Time: 10/13/23 10:20 AM   Result Value Ref Range    QT Interval 396 ms    QTC Interval 490 ms   Comprehensive Metabolic Panel    Collection Time: 10/13/23 10:25 AM    Specimen: Blood   Result Value Ref Range    Glucose 95 65 - 99 mg/dL    BUN 15 8 - 23 mg/dL    Creatinine 0.77 0.57 - 1.00 mg/dL    Sodium 144 136 - 145 mmol/L    Potassium 3.4 (L) 3.5 - 5.2 mmol/L    Chloride 106 98 - 107 mmol/L    CO2 27.3 22.0 - 29.0 mmol/L    Calcium 9.9 8.6 - 10.5 mg/dL    Total Protein 6.7 6.0 - 8.5 g/dL    Albumin 4.3 3.5 - 5.2 g/dL    ALT (SGPT) 17 1 - 33 U/L    AST (SGOT) 19 1 - 32 U/L    Alkaline Phosphatase 52 39 - 117 U/L    Total Bilirubin 0.6 0.0 - 1.2 mg/dL    Globulin 2.4 gm/dL    A/G Ratio 1.8 g/dL    BUN/Creatinine Ratio 19.5 7.0 - 25.0    Anion Gap 10.7 5.0 - 15.0 mmol/L    eGFR 80.1 >60.0 mL/min/1.73   High Sensitivity Troponin T    Collection Time: 10/13/23 10:25 AM    Specimen: Blood   Result Value Ref Range    HS Troponin T 36 (H) <10 ng/L   Green Top (Gel)    Collection Time: 10/13/23 10:25 AM   Result Value Ref Range    Extra Tube Hold for add-ons.    Lavender Top    Collection Time: 10/13/23 10:25 AM   Result Value Ref Range    Extra Tube hold for add-on    Light Blue Top    Collection Time: 10/13/23 10:25 AM   Result Value Ref Range    Extra Tube Hold for add-ons.    CBC Auto Differential    Collection Time: 10/13/23 10:25 AM    Specimen: Blood   Result  Value Ref Range    WBC 5.02 3.40 - 10.80 10*3/mm3    RBC 4.59 3.77 - 5.28 10*6/mm3    Hemoglobin 14.0 12.0 - 15.9 g/dL    Hematocrit 43.3 34.0 - 46.6 %    MCV 94.3 79.0 - 97.0 fL    MCH 30.5 26.6 - 33.0 pg    MCHC 32.3 31.5 - 35.7 g/dL    RDW 13.0 12.3 - 15.4 %    RDW-SD 44.8 37.0 - 54.0 fl    MPV 10.1 6.0 - 12.0 fL    Platelets 180 140 - 450 10*3/mm3    Neutrophil % 48.0 42.7 - 76.0 %    Lymphocyte % 42.0 19.6 - 45.3 %    Monocyte % 8.2 5.0 - 12.0 %    Eosinophil % 1.4 0.3 - 6.2 %    Basophil % 0.2 0.0 - 1.5 %    Immature Grans % 0.2 0.0 - 0.5 %    Neutrophils, Absolute 2.41 1.70 - 7.00 10*3/mm3    Lymphocytes, Absolute 2.11 0.70 - 3.10 10*3/mm3    Monocytes, Absolute 0.41 0.10 - 0.90 10*3/mm3    Eosinophils, Absolute 0.07 0.00 - 0.40 10*3/mm3    Basophils, Absolute 0.01 0.00 - 0.20 10*3/mm3    Immature Grans, Absolute 0.01 0.00 - 0.05 10*3/mm3    nRBC 0.0 0.0 - 0.2 /100 WBC   Protime-INR    Collection Time: 10/13/23 10:25 AM    Specimen: Blood   Result Value Ref Range    Protime 14.8 (H) 11.7 - 14.2 Seconds    INR 1.14 (H) 0.90 - 1.10   aPTT    Collection Time: 10/13/23 10:25 AM    Specimen: Blood   Result Value Ref Range    PTT 30.8 22.7 - 35.4 seconds   High Sensitivity Troponin T 2Hr    Collection Time: 10/13/23  1:05 PM    Specimen: Blood   Result Value Ref Range    HS Troponin T 465 (C) <10 ng/L    Troponin T Delta 429 (C) >=-4 - <+4 ng/L       I ordered the above labs and reviewed the results.    RADIOLOGY  XR Chest 1 View    Result Date: 10/13/2023  XR CHEST 1 VW-  HISTORY: 78-year-old female with chest pain. Right mastectomy in the past.  FINDINGS: There is chronic elevation of the right hemidiaphragm, but the elevation appears more prominent than on the radiograph from 2014. There is no convincing evidence for pneumonia or CHF.  This report was finalized on 10/13/2023 10:42 AM by Dr. Kristal Wasserman M.D on Workstation: [id number]       I ordered the above noted radiological studies. I reviewed the images  and results. I agree with the radiologist interpretation.    PROCEDURES  Procedures    MEDICATIONS GIVEN IN ER  Medications   sodium chloride 0.9 % flush 10 mL (has no administration in time range)   sodium chloride 0.9 % flush 10 mL (has no administration in time range)   nitroglycerin (NITROSTAT) SL tablet 0.4 mg (0.4 mg Sublingual Given 10/13/23 1029)   aspirin chewable tablet 324 mg (324 mg Oral Given 10/13/23 1028)       PROGRESS, DATA ANALYSIS, CONSULTS, AND MEDICAL DECISION MAKING  A complete history and physical exam have been performed.  All available laboratory and imaging results have been reviewed by myself prior to disposition.    MDM    After the initial H&P, I discussed pertinent information from history and physical exam with patient/family.  Discussed differential diagnosis.  Discussed plan for ED evaluation/workup/treatment.  All questions answered.  Patient/family is agreeable with plan.  ED Course as of 10/13/23 1530   Fri Oct 13, 2023   1021 EKG independently viewed and contemporaneously interpreted by ED physician. Time: 10:20 AM.  Rate 92.  Interpretation: Normal sinus rhythm, left axis deviation, first-degree AV block, left bundle branch block, appropriate discordant ST changes, Sgarbossa criteria negative. [JG]   1022 When compared with prior EKG on 5/15/2014, patient had normal sinus rhythm with normal QRS at that time, left bundle branch block is new when compared with prior. [JG]   1030 Nursing staff contacted many immediately after obtaining EKG, EKG concerning, I went to evaluate patient.  Patient complaining of concerning chest pain, EKG shows left bundle branch block, Sgarbossa negative, prior EKG from 2014 showed normal sinus rhythm.  Given new left bundle yasemin block and patient with concerning chest pain,  I consulted interventional cardiology.  While waiting for interventional cardiology to call back, I found previous EKG from 2019 in media, patient had left bundle branch block  at that time. [JG]   1032 Phone call with Dr. Capps, interventionalist cardiology.  Discussed the patient, relevant history, exam, diagnostics, ED findings/progress, and concerns.  He has reviewed patient's EKG, agrees that it is chronic left bundle branch block, Sgarbossa negative, no plans for immediate intervention. [JG]   1036 I reviewed patient's EKG from 10/23/2019, sinus bradycardia with left axis and left bundle branch block, appropriate discordant changes. [JG]   1037 Patient reassessed, discussed ED work-up and results to present, discussed discussion with interventional cardiology.  Giving aspirin, treating pain with nitroglycerin, plan with cardiac work-up with chest x-ray and serial troponins. [JG]   1115 Patient is status post nitroglycerin, reports chest pain has resolved, denies any chest pain or anginal equivalents at present. [JG]   1118 I reviewed chest x-ray in PACS, no pulmonary infiltrates per my read. [JG]   1232 Initial high-sensitivity troponin minimally elevated at 36, no prior for comparison.  Patient status post aspirin nitroglycerin.  Repeat troponin pending. [JG]   1242 Patient reassessed, discussed ED work-up and results to present, discussed elevated troponin, plan for repeat troponin, plan for admission for cardiology evaluation.  Patient has no questions or concerns at present.  Patient continues to endorse resolution of symptoms, no chest pain or anginal equivalents at present. [JG]   1353 Repeat troponin significantly elevated at 465, delta of 429.  Patient still asymptomatic.  Patient with NSTEMI, status post aspirin and nitroglycerin, patient chronically anticoagulated on Eliquis, consulting cardiology. [JG]   1357 HEART SCORE:    History #1  (Highly suspicious 2, Moderately suspicious 1, Slightly or non-suspicious 0)    ECG #2  (Significant ST depression 2,  Nonspecific repol disturbance 1, Normal 0)    Age #2  (> or = 65 2, 46-65 1,  < or = 45 0)    Risk factors  #1  (hypercholesterolemia, HTN, DM, smoking, pos fam hx, obesity)  (> or = to 3 RF 2, 1 or 2 1, No risk factors 0)    Troponin #3  (> or = 3x normal limit 2, 1-3x normal limit 1, < or = Normal limit 0)    HEART Score Key:  Scores 0-3: 0.9-1.7% risk of adverse cardiac event. In the HEART Score study, these patients were discharged (0.99% in the retrospective study, 1.7% in the prospective study)  Scores 4-6: 12-16.6% risk of adverse cardiac event. In the HEART Score study, these patients were admitted to the hospital. (11.6% retrospective, 16.6% prospective)  Scores =7: 50-65% risk of adverse cardiac event. In the HEART Score study, these patients were candidates for early invasive measures. (65.2% retrospective, 50.1% prospective)      This patient's HEART score is 9     [JG]   1421 I reviewed patient's cardiologist office visit note from July 18 this year, patient seen for heart failure, had echocardiogram at that time, echocardiogram showed EF of 55%. [JG]   1429 Patient reports history of breast cancer in the past, not currently undergoing treatment, reports last treatment 5 years ago, is undergoing routine surveillance, reportedly negative at last evaluation. [JG]   1449 Still waiting on cardiology callback for admission. [JG]   1527 Phone call with Dr. Capps, cardiology.  Discussed the patient, relevant history, exam, diagnostics, ED findings/progress, and concerns. They agree to admit the patient to telemetry. Care assumed by the admitting physician at this time.     [JG]      ED Course User Index  [JG] Justo Zaidi MD       AS OF 15:30 EDT VITALS:    BP - 146/85  HR - 60  TEMP - 97.6 °F (36.4 °C) (Tympanic)  O2 SATS - 97%    DIAGNOSIS  Final diagnoses:   NSTEMI (non-ST elevated myocardial infarction)     Critical care:  Total critical care time of 110 minutes is exclusive of any other billable procedures and includes time spent with direct patient care and observation, retrospective chart review,  management of acute condition, and consultation with other physicians.      DISPOSITION  ADMISSION    Discussed treatment plan and reason for admission with pt/family and admitting physician.  Pt/family voiced understanding of the plan for admission for further testing/treatment as needed.        Justo Zaidi MD  10/13/23 2135

## 2023-10-13 NOTE — ED NOTES
Nursing report ED to floor  Lili Ruiz  78 y.o.  female    HPI (triage note):   Chief Complaint   Patient presents with    Chest Pain     Lili Ruiz is a 78 y.o. female who presents to the ED c/o chief complaint of chest pain.  Patient complains of chest pain that began shortly before arrival, patient complains of chest pressure, well localized, radiates through to her back.  Patient denies any radiation to her neck or extremities.  Patient does endorse some associated shortness of breath, no nausea vomiting or diaphoresis.  Patient reports that she was at baseline health prior to onset of pain, denies any abdominal pain, eating drinking normally, no cough or upper respiratory symptoms.     Admitting doctor:   Sharad Capps MD    Admitting diagnosis:   The encounter diagnosis was NSTEMI (non-ST elevated myocardial infarction).    Code status:   Current Code Status       Date Active Code Status Order ID Comments User Context       Not on file            Allergies:   Penicillins, Hydrocodone-acetaminophen, and Sotalol hcl    Past Medical History:  Past Medical History:   Diagnosis Date    Anxiety     Aortic valve insufficiency     Arthritis     Atrial fibrillation     Breast cancer     Cancer     Breast    Cataract     bilateral eyes    Depression     Diverticulosis     Human metapneumovirus (hMPV) pneumonia     Hyperlipidemia     Hypertension     Kidney stone     Low back pain     Obesity     Peptic ulceration     Visual impairment         Weight:       10/13/23  1028   Weight: 77.1 kg (170 lb)       Most recent vitals:   Vitals:    10/13/23 1501 10/13/23 1502 10/13/23 1503 10/13/23 1531   BP: 146/85   130/87   Patient Position:       Pulse:  77 60 72   Resp:       Temp:       TempSrc:       SpO2:  99% 97% 98%   Weight:       Height:           Active LDAs/IV Access:   Lines, Drains & Airways       Active LDAs       Name Placement date Placement time Site Days    Peripheral IV 10/13/23 1026 Left  Antecubital 10/13/23  1026  Antecubital  less than 1                    Labs (abnormal labs have a star):   Labs Reviewed   COMPREHENSIVE METABOLIC PANEL - Abnormal; Notable for the following components:       Result Value    Potassium 3.4 (*)     All other components within normal limits    Narrative:     GFR Normal >60  Chronic Kidney Disease <60  Kidney Failure <15    The GFR formula is only valid for adults with stable renal function between ages 18 and 70.   TROPONIN - Abnormal; Notable for the following components:    HS Troponin T 36 (*)     All other components within normal limits    Narrative:     High Sensitive Troponin T Reference Range:  <10.0 ng/L- Negative Female for AMI  <15.0 ng/L- Negative Male for AMI  >=10 - Abnormal Female indicating possible myocardial injury.  >=15 - Abnormal Male indicating possible myocardial injury.   Clinicians would have to utilize clinical acumen, EKG, Troponin, and serial changes to determine if it is an Acute Myocardial Infarction or myocardial injury due to an underlying chronic condition.        PROTIME-INR - Abnormal; Notable for the following components:    Protime 14.8 (*)     INR 1.14 (*)     All other components within normal limits   HIGH SENSITIVITIY TROPONIN T 2HR - Abnormal; Notable for the following components:    HS Troponin T 465 (*)     Troponin T Delta 429 (*)     All other components within normal limits    Narrative:     High Sensitive Troponin T Reference Range:  <10.0 ng/L- Negative Female for AMI  <15.0 ng/L- Negative Male for AMI  >=10 - Abnormal Female indicating possible myocardial injury.  >=15 - Abnormal Male indicating possible myocardial injury.   Clinicians would have to utilize clinical acumen, EKG, Troponin, and serial changes to determine if it is an Acute Myocardial Infarction or myocardial injury due to an underlying chronic condition.        CBC WITH AUTO DIFFERENTIAL - Normal   APTT - Normal   RAINBOW DRAW    Narrative:     The  following orders were created for panel order Violet Draw.  Procedure                               Abnormality         Status                     ---------                               -----------         ------                     Green Top (Gel)[[id number]]                                  Final result               Lavender Top[[id number]]                                     Final result               Gold Top - SST[[id number]]                                                              Light Blue Top[[id number]]                                   Final result                 Please view results for these tests on the individual orders.   CBC AND DIFFERENTIAL    Narrative:     The following orders were created for panel order CBC & Differential.  Procedure                               Abnormality         Status                     ---------                               -----------         ------                     CBC Auto Differential[[id number]]        Normal              Final result                 Please view results for these tests on the individual orders.   GREEN TOP   LAVENDER TOP   LIGHT BLUE TOP   GOLD TOP - SST       EKG:   ECG 12 Lead ED Triage Standing Order; Chest Pain   Final Result   HEART RATE= 92  bpm   RR Interval= 652  ms   [place] Interval= 218  ms   P Horizontal Axis= -59  deg   P Front Axis= 93  deg   QRSD Interval= 157  ms   QT Interval= 396  ms   QTcB= 490  ms   QRS Axis= -61  deg   T Wave Axis= 89  deg   - ABNORMAL ECG -   Sinus rhythm   LBBB is new   Electronically Signed By: Nupur Stroud ([facility]) 13-Oct-2023 13:53:05   Date and Time of Study: 2023-10-13 10:20:22      SCANNED - TELEMETRY     Final Result      SCANNED - TELEMETRY     Final Result          Meds given in ED:   Medications   sodium chloride 0.9 % flush 10 mL (has no administration in time range)   sodium chloride 0.9 % flush 10 mL (has no administration in time range)   nitroglycerin (NITROSTAT) SL tablet 0.4 mg (0.4 mg  Sublingual Given 10/13/23 1029)   aspirin chewable tablet 324 mg (324 mg Oral Given 10/13/23 1028)       Imaging results:  No radiology results for the last day    Ambulatory status:   - assist x1    Social issues:   Social History     Socioeconomic History    Marital status:    Tobacco Use    Smoking status: Former    Smokeless tobacco: Never    Tobacco comments:     50 yrs ago   Substance and Sexual Activity    Alcohol use: No    Drug use: Never          NIH Stroke Scale:         Meng Thorpe RN  10/13/23 15:42 EDT    Nurse Direct line for any questions: [phone] -- GÉNESIS Marrufo

## 2023-10-14 ENCOUNTER — APPOINTMENT (OUTPATIENT)
Dept: CARDIOLOGY | Facility: HOSPITAL | Age: 78
DRG: 281 | End: 2023-10-14
Payer: MEDICARE

## 2023-10-14 LAB
ANION GAP SERPL CALCULATED.3IONS-SCNC: 7.6 MMOL/L (ref 5–15)
BUN SERPL-MCNC: 13 MG/DL (ref 8–23)
BUN/CREAT SERPL: 22.4 (ref 7–25)
CALCIUM SPEC-SCNC: 9.1 MG/DL (ref 8.6–10.5)
CHLORIDE SERPL-SCNC: 110 MMOL/L (ref 98–107)
CO2 SERPL-SCNC: 24.4 MMOL/L (ref 22–29)
CREAT SERPL-MCNC: 0.58 MG/DL (ref 0.57–1)
EGFRCR SERPLBLD CKD-EPI 2021: 93.9 ML/MIN/1.73
GLUCOSE SERPL-MCNC: 90 MG/DL (ref 65–99)
POTASSIUM SERPL-SCNC: 4.3 MMOL/L (ref 3.5–5.2)
SODIUM SERPL-SCNC: 142 MMOL/L (ref 136–145)

## 2023-10-14 PROCEDURE — 93356 MYOCRD STRAIN IMG SPCKL TRCK: CPT

## 2023-10-14 PROCEDURE — 93306 TTE W/DOPPLER COMPLETE: CPT

## 2023-10-14 PROCEDURE — 25510000001 IOPAMIDOL PER 1 ML: Performed by: INTERNAL MEDICINE

## 2023-10-14 PROCEDURE — B2111ZZ FLUOROSCOPY OF MULTIPLE CORONARY ARTERIES USING LOW OSMOLAR CONTRAST: ICD-10-PCS | Performed by: INTERNAL MEDICINE

## 2023-10-14 PROCEDURE — 25810000003 SODIUM CHLORIDE 0.9 % SOLUTION: Performed by: INTERNAL MEDICINE

## 2023-10-14 PROCEDURE — C1769 GUIDE WIRE: HCPCS | Performed by: INTERNAL MEDICINE

## 2023-10-14 PROCEDURE — 25010000002 FENTANYL CITRATE (PF) 50 MCG/ML SOLUTION: Performed by: INTERNAL MEDICINE

## 2023-10-14 PROCEDURE — 25510000001 PERFLUTREN (DEFINITY) 8.476 MG IN SODIUM CHLORIDE (PF) 0.9 % 10 ML INJECTION: Performed by: INTERNAL MEDICINE

## 2023-10-14 PROCEDURE — 25010000002 MIDAZOLAM PER 1 MG: Performed by: INTERNAL MEDICINE

## 2023-10-14 PROCEDURE — 80048 BASIC METABOLIC PNL TOTAL CA: CPT | Performed by: INTERNAL MEDICINE

## 2023-10-14 PROCEDURE — 36415 COLL VENOUS BLD VENIPUNCTURE: CPT | Performed by: INTERNAL MEDICINE

## 2023-10-14 PROCEDURE — 93356 MYOCRD STRAIN IMG SPCKL TRCK: CPT | Performed by: INTERNAL MEDICINE

## 2023-10-14 PROCEDURE — 99152 MOD SED SAME PHYS/QHP 5/>YRS: CPT | Performed by: INTERNAL MEDICINE

## 2023-10-14 PROCEDURE — 93306 TTE W/DOPPLER COMPLETE: CPT | Performed by: INTERNAL MEDICINE

## 2023-10-14 PROCEDURE — 25010000002 HEPARIN (PORCINE) PER 1000 UNITS: Performed by: INTERNAL MEDICINE

## 2023-10-14 PROCEDURE — C1894 INTRO/SHEATH, NON-LASER: HCPCS | Performed by: INTERNAL MEDICINE

## 2023-10-14 PROCEDURE — 93458 L HRT ARTERY/VENTRICLE ANGIO: CPT | Performed by: INTERNAL MEDICINE

## 2023-10-14 PROCEDURE — B2151ZZ FLUOROSCOPY OF LEFT HEART USING LOW OSMOLAR CONTRAST: ICD-10-PCS | Performed by: INTERNAL MEDICINE

## 2023-10-14 PROCEDURE — 4A023N7 MEASUREMENT OF CARDIAC SAMPLING AND PRESSURE, LEFT HEART, PERCUTANEOUS APPROACH: ICD-10-PCS | Performed by: INTERNAL MEDICINE

## 2023-10-14 RX ORDER — VERAPAMIL HYDROCHLORIDE 2.5 MG/ML
INJECTION, SOLUTION INTRAVENOUS
Status: DISCONTINUED | OUTPATIENT
Start: 2023-10-14 | End: 2023-10-14 | Stop reason: HOSPADM

## 2023-10-14 RX ORDER — NITROGLYCERIN 0.4 MG/1
0.4 TABLET SUBLINGUAL
Status: DISCONTINUED | OUTPATIENT
Start: 2023-10-14 | End: 2023-10-15 | Stop reason: HOSPADM

## 2023-10-14 RX ORDER — SODIUM CHLORIDE 9 MG/ML
INJECTION, SOLUTION INTRAVENOUS
Status: COMPLETED | OUTPATIENT
Start: 2023-10-14 | End: 2023-10-14

## 2023-10-14 RX ORDER — ACETAMINOPHEN 325 MG/1
650 TABLET ORAL EVERY 4 HOURS PRN
Status: DISCONTINUED | OUTPATIENT
Start: 2023-10-14 | End: 2023-10-15 | Stop reason: HOSPADM

## 2023-10-14 RX ORDER — MIDAZOLAM HYDROCHLORIDE 1 MG/ML
INJECTION INTRAMUSCULAR; INTRAVENOUS
Status: DISCONTINUED | OUTPATIENT
Start: 2023-10-14 | End: 2023-10-14 | Stop reason: HOSPADM

## 2023-10-14 RX ORDER — LIDOCAINE HYDROCHLORIDE 20 MG/ML
INJECTION, SOLUTION INFILTRATION; PERINEURAL
Status: DISCONTINUED | OUTPATIENT
Start: 2023-10-14 | End: 2023-10-14 | Stop reason: HOSPADM

## 2023-10-14 RX ORDER — HEPARIN SODIUM 1000 [USP'U]/ML
INJECTION, SOLUTION INTRAVENOUS; SUBCUTANEOUS
Status: DISCONTINUED | OUTPATIENT
Start: 2023-10-14 | End: 2023-10-14 | Stop reason: HOSPADM

## 2023-10-14 RX ORDER — FENTANYL CITRATE 50 UG/ML
INJECTION, SOLUTION INTRAMUSCULAR; INTRAVENOUS
Status: DISCONTINUED | OUTPATIENT
Start: 2023-10-14 | End: 2023-10-14 | Stop reason: HOSPADM

## 2023-10-14 RX ADMIN — METOPROLOL TARTRATE 25 MG: 25 TABLET, FILM COATED ORAL at 20:27

## 2023-10-14 RX ADMIN — LISINOPRIL 40 MG: 20 TABLET ORAL at 09:08

## 2023-10-14 RX ADMIN — PRAVASTATIN SODIUM 20 MG: 20 TABLET ORAL at 20:26

## 2023-10-14 RX ADMIN — PANTOPRAZOLE SODIUM 40 MG: 40 TABLET, DELAYED RELEASE ORAL at 06:58

## 2023-10-14 RX ADMIN — ASPIRIN 81 MG: 81 TABLET, COATED ORAL at 09:08

## 2023-10-14 RX ADMIN — PERFLUTREN 2 ML: 6.52 INJECTION, SUSPENSION INTRAVENOUS at 15:19

## 2023-10-14 RX ADMIN — METOPROLOL TARTRATE 25 MG: 25 TABLET, FILM COATED ORAL at 09:06

## 2023-10-14 NOTE — PLAN OF CARE
Goal Outcome Evaluation:  Plan of Care Reviewed With: patient           Outcome Evaluation: Patient with no c/o chest pain this pm. Patient NPO for possible am cath. Patient b/p 110-130's/60-80's HR 50-80's remains SR with BBB. Patient given x1 dose potassium this pm per protocol. Will continue to monitor and await herat cath.

## 2023-10-15 ENCOUNTER — READMISSION MANAGEMENT (OUTPATIENT)
Dept: CALL CENTER | Facility: HOSPITAL | Age: 78
End: 2023-10-15
Payer: MEDICARE

## 2023-10-15 ENCOUNTER — APPOINTMENT (OUTPATIENT)
Dept: CARDIOLOGY | Facility: HOSPITAL | Age: 78
DRG: 281 | End: 2023-10-15
Payer: MEDICARE

## 2023-10-15 VITALS
OXYGEN SATURATION: 96 % | HEIGHT: 63 IN | RESPIRATION RATE: 18 BRPM | BODY MASS INDEX: 30.12 KG/M2 | HEART RATE: 64 BPM | TEMPERATURE: 98 F | SYSTOLIC BLOOD PRESSURE: 97 MMHG | WEIGHT: 169.97 LBS | DIASTOLIC BLOOD PRESSURE: 52 MMHG

## 2023-10-15 LAB
AORTIC DIMENSIONLESS INDEX: 0.6 (DI)
BH CV ECHO LEFT VENTRICLE GLOBAL LONGITUDINAL STRAIN: -10.7 %
BH CV ECHO MEAS - AI P1/2T: 668.3 MSEC
BH CV ECHO MEAS - AO MAX PG: 12.3 MMHG
BH CV ECHO MEAS - AO MEAN PG: 6.3 MMHG
BH CV ECHO MEAS - AO V2 MAX: 175.5 CM/SEC
BH CV ECHO MEAS - AO V2 VTI: 36.8 CM
BH CV ECHO MEAS - AVA(I,D): 1.61 CM2
BH CV ECHO MEAS - EDV(CUBED): 121.8 ML
BH CV ECHO MEAS - EDV(MOD-SP2): 194 ML
BH CV ECHO MEAS - EDV(MOD-SP4): 173 ML
BH CV ECHO MEAS - EF(MOD-BP): 30.3 %
BH CV ECHO MEAS - EF(MOD-SP2): 30.4 %
BH CV ECHO MEAS - EF(MOD-SP4): 24.9 %
BH CV ECHO MEAS - ESV(CUBED): 73 ML
BH CV ECHO MEAS - ESV(MOD-SP2): 135 ML
BH CV ECHO MEAS - ESV(MOD-SP4): 130 ML
BH CV ECHO MEAS - FS: 15.7 %
BH CV ECHO MEAS - IVS/LVPW: 0.99 CM
BH CV ECHO MEAS - IVSD: 0.92 CM
BH CV ECHO MEAS - LAT PEAK E' VEL: 5.8 CM/SEC
BH CV ECHO MEAS - LV MASS(C)D: 162.5 GRAMS
BH CV ECHO MEAS - LV MAX PG: 3.4 MMHG
BH CV ECHO MEAS - LV MEAN PG: 1.6 MMHG
BH CV ECHO MEAS - LV V1 MAX: 92.5 CM/SEC
BH CV ECHO MEAS - LV V1 VTI: 20.9 CM
BH CV ECHO MEAS - LVIDD: 5 CM
BH CV ECHO MEAS - LVIDS: 4.2 CM
BH CV ECHO MEAS - LVOT AREA: 2.8 CM2
BH CV ECHO MEAS - LVOT DIAM: 1.9 CM
BH CV ECHO MEAS - LVPWD: 0.93 CM
BH CV ECHO MEAS - MED PEAK E' VEL: 5.2 CM/SEC
BH CV ECHO MEAS - MV A DUR: 0.2 SEC
BH CV ECHO MEAS - MV A MAX VEL: 105.8 CM/SEC
BH CV ECHO MEAS - MV DEC SLOPE: 359.8 CM/SEC2
BH CV ECHO MEAS - MV DEC TIME: 156 SEC
BH CV ECHO MEAS - MV E MAX VEL: 55.7 CM/SEC
BH CV ECHO MEAS - MV E/A: 0.53
BH CV ECHO MEAS - MV MAX PG: 4.7 MMHG
BH CV ECHO MEAS - MV MEAN PG: 1.34 MMHG
BH CV ECHO MEAS - MV P1/2T: 55.8 MSEC
BH CV ECHO MEAS - MV V2 VTI: 26.7 CM
BH CV ECHO MEAS - MVA(P1/2T): 3.9 CM2
BH CV ECHO MEAS - MVA(VTI): 2.22 CM2
BH CV ECHO MEAS - PA ACC TIME: 0.11 SEC
BH CV ECHO MEAS - PA V2 MAX: 95 CM/SEC
BH CV ECHO MEAS - PULM A REVS DUR: 0.19 SEC
BH CV ECHO MEAS - PULM A REVS VEL: 38.6 CM/SEC
BH CV ECHO MEAS - PULM DIAS VEL: 26.1 CM/SEC
BH CV ECHO MEAS - PULM S/D: 2.8
BH CV ECHO MEAS - PULM SYS VEL: 73.3 CM/SEC
BH CV ECHO MEAS - QP/QS: 0.64
BH CV ECHO MEAS - RAP SYSTOLE: 8 MMHG
BH CV ECHO MEAS - RV MAX PG: 1.65 MMHG
BH CV ECHO MEAS - RV V1 MAX: 64.3 CM/SEC
BH CV ECHO MEAS - RV V1 VTI: 13.9 CM
BH CV ECHO MEAS - RVOT DIAM: 1.86 CM
BH CV ECHO MEAS - RVSP: 35.2 MMHG
BH CV ECHO MEAS - SV(LVOT): 59.2 ML
BH CV ECHO MEAS - SV(MOD-SP2): 59 ML
BH CV ECHO MEAS - SV(MOD-SP4): 43 ML
BH CV ECHO MEAS - SV(RVOT): 37.8 ML
BH CV ECHO MEAS - TAPSE (>1.6): 1.9 CM
BH CV ECHO MEAS - TR MAX PG: 27.2 MMHG
BH CV ECHO MEAS - TR MAX VEL: 260.8 CM/SEC
BH CV ECHO MEASUREMENTS AVERAGE E/E' RATIO: 10.13
BH CV XLRA - RV BASE: 3.5 CM
BH CV XLRA - RV LENGTH: 7.5 CM
BH CV XLRA - RV MID: 2.06 CM
BH CV XLRA - TDI S': 12 CM/SEC
GEN 5 2HR TROPONIN T REFLEX: 73 NG/L
LEFT ATRIUM VOLUME INDEX: 27.1 ML/M2
LEFT ATRIUM VOLUME: 49 ML
SINUS: 3 CM
TROPONIN T DELTA: -13 NG/L
TROPONIN T SERPL HS-MCNC: 86 NG/L

## 2023-10-15 PROCEDURE — 84484 ASSAY OF TROPONIN QUANT: CPT | Performed by: INTERNAL MEDICINE

## 2023-10-15 PROCEDURE — 93242 EXT ECG>48HR<7D RECORDING: CPT

## 2023-10-15 PROCEDURE — 99239 HOSP IP/OBS DSCHRG MGMT >30: CPT | Performed by: INTERNAL MEDICINE

## 2023-10-15 RX ORDER — NITROGLYCERIN 0.4 MG/1
0.4 TABLET SUBLINGUAL
Qty: 25 TABLET | Refills: 12 | Status: SHIPPED | OUTPATIENT
Start: 2023-10-15

## 2023-10-15 RX ORDER — CLOPIDOGREL BISULFATE 75 MG/1
75 TABLET ORAL DAILY
Status: DISCONTINUED | OUTPATIENT
Start: 2023-10-15 | End: 2023-10-15 | Stop reason: HOSPADM

## 2023-10-15 RX ORDER — ASPIRIN 81 MG/1
81 TABLET ORAL DAILY
Qty: 30 TABLET | Refills: 6 | Status: SHIPPED | OUTPATIENT
Start: 2023-10-15

## 2023-10-15 RX ORDER — CLOPIDOGREL BISULFATE 75 MG/1
75 TABLET ORAL DAILY
Qty: 30 TABLET | Refills: 6 | Status: SHIPPED | OUTPATIENT
Start: 2023-10-15

## 2023-10-15 RX ADMIN — CLOPIDOGREL BISULFATE 75 MG: 75 TABLET, FILM COATED ORAL at 09:58

## 2023-10-15 RX ADMIN — ASPIRIN 81 MG: 81 TABLET, COATED ORAL at 09:57

## 2023-10-15 RX ADMIN — PANTOPRAZOLE SODIUM 40 MG: 40 TABLET, DELAYED RELEASE ORAL at 06:52

## 2023-10-15 NOTE — DISCHARGE SUMMARY
"  Date of Discharge:  10/15/2023    Discharge Diagnosis:   Active Hospital Problems    Diagnosis  POA    **NSTEMI (non-ST elevated myocardial infarction) [I21.4]  Yes      Resolved Hospital Problems   No resolved problems to display.       Presenting Problem/History of Present Illness  NSTEMI (non-ST elevated myocardial infarction) [I21.4]      Hospital Course  Patient is a 78 y.o. female presented with increasing shortness of breath chest pains tightness in the chest, after the hospitalization patient underwent EKG which revealed left bundle branch block, patient also had troponin high up to 425 which reverts back to 86    Echocardiogram revealed significant cardiomyopathy    Heart catheterization showed ostial 50% stenosis with haziness    Patient also has history of atrial fibrillation    We have added Plavix as well as beta-blocker to the current medications    Eliquis has been stopped temporarily because of over anticoagulation    Zio has been ordered to evaluate for atrial fibrillation    Patient will be seen back in the office in 2 to 3 weeks    Procedures Performed  Procedure(s):  Left Heart Cath       Consults:   Consults       Date and Time Order Name Status Description    10/13/2023  1:54 PM Cardiology (on-call MD unless specified)              Pertinent Test Results:     Ejection Fraction  No results found for: \"EF\"    Echo EF Estimated  Lab Results   Component Value Date    ECHOEFEST 67 11/01/2019       Nuclear Stress Ejection Fraction  No components found for: \"NUCEF\"    Cath Ejection Fraction Quantitative  No results found for: \"CATHEF\"    Condition on Discharge: Stable    Physical Exam at Discharge    Vital Signs  Temp:  [97.4 °F (36.3 °C)-98.6 °F (37 °C)] 98 °F (36.7 °C)  Heart Rate:  [53-64] 55  Resp:  [12-20] 18  BP: ()/(52-71) 104/70    Physical Exam:     General Appearance:    Alert, cooperative, in no acute distress   Head:    Normocephalic, without obvious abnormality, atraumatic   Eyes: "            Lids and lashes normal, conjunctivae and sclerae normal, no   icterus, no pallor, corneas clear, PERRLA   Ears:    Ears appear intact with no abnormalities noted   Throat:   No oral lesions, no thrush, oral mucosa moist   Neck:   No adenopathy, supple, trachea midline, no thyromegaly, no     carotid bruit, no JVD   Back:     No kyphosis present, no scoliosis present, no skin lesions,       erythema or scars, no tenderness to percussion or                   palpation,   range of motion normal   Lungs:     Clear to auscultation,respirations regular, even and                   unlabored    Heart:    Regular rhythm and normal rate, normal S1 and S2, no            murmur, no gallop, no rub, no click   Breast Exam:    Deferred   Abdomen:     Normal bowel sounds, no masses, no organomegaly, soft        non-tender, non-distended, no guarding, no rebound                 tenderness   Genitalia:    Deferred   Extremities:   Moves all extremities well, no edema, no cyanosis, no              redness   Pulses:   Pulses palpable and equal bilaterally   Skin:   No bleeding, bruising or rash   Lymph nodes:   No palpable adenopathy   Neurologic:   Cranial nerves 2 - 12 grossly intact, sensation intact, DTR        present and equal bilaterally       Discharge Disposition  Home or Self Care    Discharge Medications     Discharge Medications        New Medications        Instructions Start Date   aspirin 81 MG EC tablet   81 mg, Oral, Daily      clopidogrel 75 MG tablet  Commonly known as: PLAVIX   75 mg, Oral, Daily      metoprolol tartrate 25 MG tablet  Commonly known as: LOPRESSOR   25 mg, Oral, Every 12 Hours Scheduled      nitroglycerin 0.4 MG SL tablet  Commonly known as: NITROSTAT   0.4 mg, Sublingual, Every 5 Minutes PRN, Take no more than 3 doses in 15 minutes.             Continue These Medications        Instructions Start Date   CALCIUM 600 PO   Oral, 2 Times Daily      flecainide 100 MG tablet  Commonly known as:  TAMBOCOR   100 mg, Oral, Every 12 Hours Scheduled      lisinopril 40 MG tablet  Commonly known as: PRINIVIL,ZESTRIL   40 mg, Oral, Daily      omeprazole 20 MG capsule  Commonly known as: priLOSEC   20 mg, Oral, Daily      pravastatin 20 MG tablet  Commonly known as: PRAVACHOL   20 mg, Oral, Daily      PROBIOTIC DAILY PO   1 tablet, Oral, 2 Times Daily      traMADol 50 MG tablet  Commonly known as: ULTRAM   50 mg, Oral, Every 8 Hours PRN      Unable to find   1 each, Once, IB Valerio 2 Tables Daily       vitamin C 250 MG tablet  Commonly known as: ASCORBIC ACID   500 mg, Oral, Daily      Vitamin D3 50 MCG (2000 UT) tablet   1 tablet, Oral, Daily             Stop These Medications      apixaban 5 MG tablet tablet  Commonly known as: ELIQUIS     nitazoxanide 500 MG tablet  Commonly known as: Alinia              Discharge Diet:     Activity at Discharge:     Follow-up Appointments  No future appointments.  Additional Instructions for the Follow-ups that You Need to Schedule       Ambulatory Referral to Cardiac Rehab   As directed      Ambulatory Referral to Cardiac Rehab   As directed              Test Results Pending at Discharge  Pending Labs       Order Current Status    High Sensitivity Troponin T 2Hr In process    [place] Draw In process             Teo Orta MD  10/15/23  09:30 EDT    Time: Discharge 45 min

## 2023-10-15 NOTE — OUTREACH NOTE
Prep Survey      Flowsheet Row Responses   [facility] facility patient discharged from? [place]   Is LACE score < 7 ? No   Eligibility Readm Mgmt   Discharge diagnosis NSTEMI (non-ST elevated myocardial infarction) (I21   Does the patient have one of the following disease processes/diagnoses(primary or secondary)? Acute MI (STEMI,NSTEMI)   Does the patient have Home health ordered? No   Is there a DME ordered? No   Prep survey completed? Yes            SHELDON CORTEZ - Registered Nurse

## 2023-10-15 NOTE — CASE MANAGEMENT/SOCIAL WORK
Case Management Discharge Note      Final Note: dc home         Selected Continued Care - Discharged on 10/15/2023 Admission date: 10/13/2023 - Discharge disposition: Home or Self Care      Destination    No services have been selected for the patient.                Durable Medical Equipment    No services have been selected for the patient.                Dialysis/Infusion    No services have been selected for the patient.                Home Medical Care    No services have been selected for the patient.                Therapy    No services have been selected for the patient.                Community Resources    No services have been selected for the patient.                Community & DME    No services have been selected for the patient.                         Final Discharge Disposition Code: 01 - home or self-care

## 2023-10-19 ENCOUNTER — READMISSION MANAGEMENT (OUTPATIENT)
Dept: CALL CENTER | Facility: HOSPITAL | Age: 78
End: 2023-10-19
Payer: MEDICARE

## 2023-10-19 NOTE — OUTREACH NOTE
AMI Week 1 Survey      Flowsheet Row Responses   [facility] facility patient discharged from? [place]   Does the patient have one of the following disease processes/diagnoses(primary or secondary)? Acute MI (STEMI,NSTEMI)   Week 1 attempt successful? No   Unsuccessful attempts Attempt 1            Kallie H - Registered Nurse

## 2023-10-23 ENCOUNTER — READMISSION MANAGEMENT (OUTPATIENT)
Dept: CALL CENTER | Facility: HOSPITAL | Age: 78
End: 2023-10-23
Payer: MEDICARE

## 2023-10-23 NOTE — OUTREACH NOTE
AMI Week 1 Survey      Flowsheet Row Responses   [facility] facility patient discharged from? [place]   Does the patient have one of the following disease processes/diagnoses(primary or secondary)? Acute MI (STEMI,NSTEMI)   Week 1 attempt successful? No   Unsuccessful attempts Attempt 2            Darnell HARRIS - Registered Nurse

## 2023-10-24 ENCOUNTER — TELEPHONE (OUTPATIENT)
Dept: CARDIAC REHAB | Facility: HOSPITAL | Age: 78
End: 2023-10-24
Payer: MEDICARE

## 2023-10-24 NOTE — TELEPHONE ENCOUNTER
Attempted several times to reach patient regarding cardiac rehab without success. Messages left with our contact information.

## 2023-10-26 ENCOUNTER — TELEPHONE (OUTPATIENT)
Dept: GASTROENTEROLOGY | Facility: CLINIC | Age: 78
End: 2023-10-26
Payer: MEDICARE

## 2023-10-26 ENCOUNTER — READMISSION MANAGEMENT (OUTPATIENT)
Dept: CALL CENTER | Facility: HOSPITAL | Age: 78
End: 2023-10-26
Payer: MEDICARE

## 2023-10-26 NOTE — OUTREACH NOTE
AMI Week 1 Survey      Flowsheet Row Responses   [facility] facility patient discharged from? [place]   Does the patient have one of the following disease processes/diagnoses(primary or secondary)? Acute MI (STEMI,NSTEMI)   Week 1 attempt successful? No   Unsuccessful attempts Attempt 3            DEAN DORMAN - Registered Nurse

## 2023-10-26 NOTE — TELEPHONE ENCOUNTER
----- Message from SHANON Alonso sent at 10/25/2023  3:34 PM EDT -----  Regarding: recs  Please inform the patient Dr. Galan would recommend repeat colonoscopy 7 to 10 years from her last one if she is in good health.    ----- Message -----  From: Joel Galan MD  Sent: 10/11/2023   2:00 PM EDT  To: SHANON Alonso    Based on her pathology and current guidelines, recommendation would be for 7-10 years.  I'm not sure she will need further surveillance but can consider at appropriate interval if still in good health    ----- Message -----  From: Malorie Castle APRN  Sent: 10/11/2023   1:35 PM EDT  To: Joel Galan MD    This patient's last colonoscopy was 2021.  She had a mix of hyperplastic and tubular adenomas.  We have been following her for irritable bowel syndrome.  When would you like her to have another colonoscopy.. 2026?

## 2023-11-06 ENCOUNTER — OFFICE VISIT (OUTPATIENT)
Dept: CARDIOLOGY | Facility: CLINIC | Age: 78
End: 2023-11-06
Payer: MEDICARE

## 2023-11-06 VITALS
HEIGHT: 63 IN | BODY MASS INDEX: 28.88 KG/M2 | SYSTOLIC BLOOD PRESSURE: 100 MMHG | DIASTOLIC BLOOD PRESSURE: 57 MMHG | HEART RATE: 52 BPM | WEIGHT: 163 LBS

## 2023-11-06 DIAGNOSIS — I48.0 PAROXYSMAL ATRIAL FIBRILLATION: ICD-10-CM

## 2023-11-06 DIAGNOSIS — I21.4 NSTEMI (NON-ST ELEVATED MYOCARDIAL INFARCTION): ICD-10-CM

## 2023-11-06 DIAGNOSIS — I10 PRIMARY HYPERTENSION: Primary | ICD-10-CM

## 2023-11-06 DIAGNOSIS — E78.00 PURE HYPERCHOLESTEROLEMIA: ICD-10-CM

## 2023-11-06 DIAGNOSIS — I25.708 CORONARY ARTERY DISEASE OF BYPASS GRAFT OF NATIVE HEART WITH STABLE ANGINA PECTORIS: ICD-10-CM

## 2023-11-06 PROCEDURE — 99214 OFFICE O/P EST MOD 30 MIN: CPT | Performed by: INTERNAL MEDICINE

## 2023-11-06 PROCEDURE — 3074F SYST BP LT 130 MM HG: CPT | Performed by: INTERNAL MEDICINE

## 2023-11-06 PROCEDURE — 3078F DIAST BP <80 MM HG: CPT | Performed by: INTERNAL MEDICINE

## 2023-11-06 RX ORDER — CHOLECALCIFEROL (VITAMIN D3) 125 MCG
500 CAPSULE ORAL DAILY
COMMUNITY

## 2023-11-06 RX ORDER — MAGNESIUM OXIDE 400 MG/1
400 TABLET ORAL DAILY
COMMUNITY

## 2023-11-06 NOTE — PROGRESS NOTES
Sp mi   Subjective:        Lili Ruiz is a 78 y.o. female who here for follow up    CC  Recent hospital follow-up with possible non-Q MI  HPI  78 years old female with coronary artery disease, was recently admitted to the hospital had a heart catheterization has a ostial LAD stenosis being treated medically     Problems Addressed this Visit          Cardiac and Vasculature    Hypertension - Primary    Relevant Orders    Treadmill Stress Test    Holter Monitor - 48 Hour    Hyperlipidemia    Relevant Orders    Treadmill Stress Test    Holter Monitor - 48 Hour    Atrial fibrillation    Relevant Orders    Treadmill Stress Test    Holter Monitor - 48 Hour    NSTEMI (non-ST elevated myocardial infarction)    Relevant Orders    Treadmill Stress Test    Holter Monitor - 48 Hour    Coronary artery disease of bypass graft of native heart with stable angina pectoris     Diagnoses         Codes Comments    Primary hypertension    -  Primary ICD-10-CM: I10  ICD-9-CM: 401.9     Pure hypercholesterolemia     ICD-10-CM: E78.00  ICD-9-CM: 272.0     Paroxysmal atrial fibrillation     ICD-10-CM: I48.0  ICD-9-CM: 427.31     NSTEMI (non-ST elevated myocardial infarction)     ICD-10-CM: I21.4  ICD-9-CM: 410.70     Coronary artery disease of bypass graft of native heart with stable angina pectoris     ICD-10-CM: I25.708  ICD-9-CM: 414.05, 413.9           .    The following portions of the patient's history were reviewed and updated as appropriate: allergies, current medications, past family history, past medical history, past social history, past surgical history and problem list.    Past Medical History:   Diagnosis Date    Anxiety     Aortic valve insufficiency     Arthritis     Atrial fibrillation     Breast cancer     Cancer     Breast    Cataract     bilateral eyes    Depression     Diverticulosis     Human metapneumovirus (hMPV) pneumonia     Hyperlipidemia     Hypertension     Kidney stone     Low back pain     Obesity      "Peptic ulceration     Visual impairment      reports that she has quit smoking. She has never used smokeless tobacco. She reports that she does not drink alcohol and does not use drugs.   Family History   Problem Relation Age of Onset    Cancer Mother     Ovarian cancer Mother     Heart disease Father     Colon cancer Maternal Aunt        Review of Systems  Constitutional: No wt loss, fever, fatigue  Gastrointestinal: No nausea, abdominal pain  Behavioral/Psych: No insomnia or anxiety   Cardiovascular no angina pectoris  Objective:       Physical Exam           Physical Exam  /57   Pulse 52   Ht 160 cm (63\")   Wt 73.9 kg (163 lb)   BMI 28.87 kg/m²     General appearance: No acute changes   Eyes: Sclerae conjunctivae normal, pupils reactive   HENT: Atraumatic; oropharynx clear with moist mucous membranes and no mucosal ulcerations;  Neck: Trachea midline; NECK, supple, no thyromegaly or lymphadenopathy   Lungs: Normal size and shape, normal breath sounds, equal distribution of air, no rales and rhonchi   CV: S1-S2 regular, no murmurs, no rub, no gallop   Abdomen: Soft, nontender; no masses , no abnormal abdominal sounds   Extremities: No deformity , normal color , no peripheral edema   Skin: Normal temperature, turgor and texture; no rash, ulcers  Psych: Appropriate affect, alert and oriented to person, place and time           Procedures      Echocardiogram:        Current Outpatient Medications:     aspirin 81 MG EC tablet, Take 1 tablet by mouth Daily., Disp: 30 tablet, Rfl: 6    Calcium Carbonate (CALCIUM 600 PO), Take  by mouth 2 (Two) Times a Day., Disp: , Rfl:     Cholecalciferol (VITAMIN D3) 2000 UNITS tablet, Take 1 tablet by mouth Daily., Disp: , Rfl:     clopidogrel (PLAVIX) 75 MG tablet, Take 1 tablet by mouth Daily., Disp: 30 tablet, Rfl: 6    lisinopril (PRINIVIL,ZESTRIL) 40 MG tablet, Take 1 tablet by mouth Daily., Disp: 90 tablet, Rfl: 3    magnesium oxide (MAG-OX) 400 MG tablet, Take 1 " tablet by mouth Daily., Disp: , Rfl:     metoprolol tartrate (LOPRESSOR) 25 MG tablet, Take 1 tablet by mouth Every 12 (Twelve) Hours., Disp: 30 tablet, Rfl: 6    nitroglycerin (NITROSTAT) 0.4 MG SL tablet, Place 1 tablet under the tongue Every 5 (Five) Minutes As Needed for Chest Pain (Systolic BP Greater Than 100). Take no more than 3 doses in 15 minutes., Disp: 25 tablet, Rfl: 12    omeprazole (priLOSEC) 20 MG capsule, Take 1 capsule by mouth Daily., Disp: 30 capsule, Rfl: 4    pravastatin (PRAVACHOL) 20 MG tablet, Take 1 tablet by mouth Daily., Disp: 30 tablet, Rfl: 3    Probiotic Product (PROBIOTIC DAILY PO), Take 1 tablet by mouth 2 (Two) Times a Day., Disp: , Rfl:     traMADol (ULTRAM) 50 MG tablet, Take 1 tablet by mouth Every 8 (Eight) Hours As Needed for Moderate Pain (chronic pain meds for low back pain)., Disp: 60 tablet, Rfl: 4    Unable to find, 1 each 1 (One) Time. IB Valerio 2 Tables Daily, Disp: , Rfl:     vitamin B-12 (CYANOCOBALAMIN) 500 MCG tablet, Take 1 tablet by mouth Daily., Disp: , Rfl:     vitamin C (ASCORBIC ACID) 250 MG tablet, Take 2 tablets by mouth Daily., Disp: , Rfl:    Assessment:        [unfilled]            Plan:            ICD-10-CM ICD-9-CM   1. Primary hypertension  I10 401.9   2. Pure hypercholesterolemia  E78.00 272.0   3. Paroxysmal atrial fibrillation  I48.0 427.31   4. NSTEMI (non-ST elevated myocardial infarction)  I21.4 410.70   5. Coronary artery disease of bypass graft of native heart with stable angina pectoris  I25.708 414.05     413.9     1. Primary hypertension  Continue current treatment  - Treadmill Stress Test  - Holter Monitor - 48 Hour    2. Pure hypercholesterolemia  Continue current treatment  - Treadmill Stress Test  - Holter Monitor - 48 Hour    3. Paroxysmal atrial fibrillation  Continue current treatment  - Treadmill Stress Test  - Holter Monitor - 48 Hour    4. NSTEMI (non-ST elevated myocardial infarction)  Considering the patient's symptoms as well as  clinical situation and  EKG findings, along with cardiac risk factors, ischemic workup is necessary to rule out ischemic cardiomyopathy, stress induced arrhythmias, and functional capacity for diagnosis as well as prognostic consideration    - Treadmill Stress Test  - Holter Monitor - 48 Hour    5. Coronary artery disease of bypass graft of native heart with stable angina pectoris  Considering the patient's symptoms as well as clinical situation and  EKG findings, along with cardiac risk factors, ischemic workup is necessary to rule out ischemic cardiomyopathy, stress induced arrhythmias, and functional capacity for diagnosis as well as prognostic consideration        Recent non q mi  Moderate osteal LAD stenosis    ETT, IF OK     DC RHYTHMOL    48 HR HOLTER    FOLLOW UP  COUNSELING:    Lili Yu was given to patient for the following topics: diagnostic results, risk factor reductions, impressions, risks and benefits of treatment options and importance of treatment compliance .       SMOKING COUNSELING:        Dictated using Dragon dictation

## 2023-11-07 PROBLEM — I25.708 CORONARY ARTERY DISEASE OF BYPASS GRAFT OF NATIVE HEART WITH STABLE ANGINA PECTORIS: Status: ACTIVE | Noted: 2023-11-07

## 2023-11-15 ENCOUNTER — HOSPITAL ENCOUNTER (OUTPATIENT)
Dept: CARDIOLOGY | Facility: HOSPITAL | Age: 78
Discharge: HOME | End: 2023-11-15
Admitting: INTERNAL MEDICINE
Payer: MEDICARE

## 2023-11-15 VITALS
BODY MASS INDEX: 28.88 KG/M2 | OXYGEN SATURATION: 100 % | DIASTOLIC BLOOD PRESSURE: 63 MMHG | WEIGHT: 163 LBS | HEIGHT: 63 IN | HEART RATE: 51 BPM | SYSTOLIC BLOOD PRESSURE: 107 MMHG

## 2023-11-15 PROCEDURE — 93017 CV STRESS TEST TRACING ONLY: CPT

## 2023-11-20 LAB
BH CV STRESS BP STAGE 1: NORMAL
BH CV STRESS BP STAGE 2: NORMAL
BH CV STRESS BP STAGE 3: NORMAL
BH CV STRESS BP STAGE 4: NORMAL
BH CV STRESS DURATION MIN STAGE 1: 3
BH CV STRESS DURATION MIN STAGE 2: 1
BH CV STRESS DURATION MIN STAGE 3: 2
BH CV STRESS DURATION MIN STAGE 4: 2
BH CV STRESS DURATION SEC STAGE 1: 40
BH CV STRESS DURATION SEC STAGE 2: 23
BH CV STRESS DURATION SEC STAGE 3: 30
BH CV STRESS DURATION SEC STAGE 4: 43
BH CV STRESS GRADE STAGE 1: 10
BH CV STRESS GRADE STAGE 2: 10
BH CV STRESS GRADE STAGE 3: 12
BH CV STRESS GRADE STAGE 4: 14
BH CV STRESS HR STAGE 1: 78
BH CV STRESS HR STAGE 2: 83
BH CV STRESS HR STAGE 3: 107
BH CV STRESS HR STAGE 4: 126
BH CV STRESS METS STAGE 2: 4.6
BH CV STRESS METS STAGE 3: 6.9
BH CV STRESS METS STAGE 4: 8.6
BH CV STRESS PROTOCOL 1: NORMAL
BH CV STRESS RECOVERY BP: NORMAL MMHG
BH CV STRESS RECOVERY HR: 65 BPM
BH CV STRESS SPEED STAGE 1: 1.7
BH CV STRESS SPEED STAGE 2: 1.7
BH CV STRESS SPEED STAGE 3: 2.5
BH CV STRESS SPEED STAGE 4: 2.8
BH CV STRESS STAGE 1: 1
BH CV STRESS STAGE 2: 1
BH CV STRESS STAGE 3: 2
BH CV STRESS STAGE 4: 3
MAXIMAL PREDICTED HEART RATE: 144 BPM
PERCENT MAX PREDICTED HR: 87.5 %
STRESS BASELINE BP: NORMAL MMHG
STRESS BASELINE HR: 50 BPM
STRESS O2 SAT REST: 100 %
STRESS PERCENT HR: 103 %
STRESS POST ESTIMATED WORKLOAD: 8.6 METS
STRESS POST EXERCISE DUR MIN: 10 MIN
STRESS POST EXERCISE DUR SEC: 16 SEC
STRESS POST PEAK BP: NORMAL MMHG
STRESS POST PEAK HR: 126 BPM
STRESS TARGET HR: 122 BPM

## 2023-11-28 ENCOUNTER — OFFICE VISIT (OUTPATIENT)
Dept: CARDIOLOGY | Facility: CLINIC | Age: 78
End: 2023-11-28
Payer: MEDICARE

## 2023-11-28 VITALS
BODY MASS INDEX: 30.12 KG/M2 | SYSTOLIC BLOOD PRESSURE: 100 MMHG | DIASTOLIC BLOOD PRESSURE: 68 MMHG | HEIGHT: 63 IN | WEIGHT: 170 LBS | HEART RATE: 50 BPM

## 2023-11-28 DIAGNOSIS — E78.00 PURE HYPERCHOLESTEROLEMIA: ICD-10-CM

## 2023-11-28 DIAGNOSIS — I48.0 PAROXYSMAL ATRIAL FIBRILLATION: ICD-10-CM

## 2023-11-28 DIAGNOSIS — I25.10 CORONARY ARTERY DISEASE INVOLVING NATIVE CORONARY ARTERY OF NATIVE HEART WITHOUT ANGINA PECTORIS: Primary | ICD-10-CM

## 2023-11-28 DIAGNOSIS — I42.0 CARDIOMYOPATHY, DILATED: ICD-10-CM

## 2023-11-28 PROCEDURE — 99214 OFFICE O/P EST MOD 30 MIN: CPT

## 2023-11-28 PROCEDURE — 3078F DIAST BP <80 MM HG: CPT

## 2023-11-28 PROCEDURE — 3074F SYST BP LT 130 MM HG: CPT

## 2023-11-28 RX ORDER — METOPROLOL SUCCINATE 25 MG/1
25 TABLET, EXTENDED RELEASE ORAL DAILY
Qty: 30 TABLET | Refills: 11 | Status: SHIPPED | OUTPATIENT
Start: 2023-11-28

## 2023-11-28 RX ORDER — SACUBITRIL AND VALSARTAN 24; 26 MG/1; MG/1
1 TABLET, FILM COATED ORAL 2 TIMES DAILY
Qty: 60 TABLET | Refills: 11 | Status: SHIPPED | OUTPATIENT
Start: 2023-11-28

## 2023-11-28 NOTE — PROGRESS NOTES
Subjective:        Lili Ruiz is a 78 y.o. female who here for follow up    Chief Complaint   Patient presents with    Follow-up     TEST RESULT       HPI    This is a 78-year-old female with paroxysmal atrial fibrillation, coronary artery disease, hypertension, hyperlipidemia.  She was admitted 10/13/2023 through 10/15/2023 for NSTEMI.  She was started on Plavix and Eliquis temporarily discontinued due to to concern for over anticoagulation.  She was seen in office for follow-up and recommended treadmill test.  Rythmol discontinued at that time. Treadmill stress test 11/15/2023 was an equivocal ECG stress test, asymptomatic for chest pain.    Echo 10/14/2023 EF 25 to 30%, hypokinetic LV wall segments, full report as below.  Grade 1 LV diastolic dysfunction.  Mildly elevated RVSP 35 to 45 mmHg.  Cardiac catheterization 10/14/2023 normal left main, LAD at the ostial level of 50% narrowing with haziness, minimum regularity distally.  Circumflex nondominant and normal.  RCA dominant normal.  LV gram shows mild LV dysfunction EF 45%. Holter monitor 10/15/2023 rare PACs, PVCs, NS SVTs.    The following portions of the patient's history were reviewed and updated as appropriate: allergies, current medications, past family history, past medical history, past social history, past surgical history and problem list.    Past Medical History:   Diagnosis Date    Anxiety     Aortic valve insufficiency     Arthritis     Atrial fibrillation     Breast cancer     Cancer     Breast    Cataract     bilateral eyes    Depression     Diverticulosis     Human metapneumovirus (hMPV) pneumonia     Hyperlipidemia     Hypertension     Kidney stone     Low back pain     Non Q wave myocardial infarction 10/2023    Obesity     Peptic ulceration     Visual impairment          reports that she has quit smoking. She has never used smokeless tobacco. She reports that she does not drink alcohol and does not use drugs.     Family History    Problem Relation Age of Onset    Cancer Mother     Ovarian cancer Mother     Heart disease Father     Colon cancer Maternal Aunt        ROS     Review of Systems  Constitutional: No wt loss, fever, fatigue  Gastrointestinal: No nausea, abdominal pain  Behavioral/Psych: No insomnia or anxiety  Cardiovascular no chest pain or shortness of breath      Objective:           Vitals and nursing note reviewed.   Constitutional:       Appearance: Well-developed.   HENT:      Head: Normocephalic.      Right Ear: External ear normal.      Left Ear: External ear normal.   Neck:      Vascular: No JVD.   Pulmonary:      Effort: Pulmonary effort is normal. No respiratory distress.      Breath sounds: Normal breath sounds. No stridor. No rales.   Cardiovascular:      Normal rate. Regular rhythm.      No gallop.    Pulses:     Intact distal pulses.   Edema:     Peripheral edema absent.   Abdominal:      General: Bowel sounds are normal. There is no distension.      Palpations: Abdomen is soft.      Tenderness: There is no abdominal tenderness. There is no guarding.   Musculoskeletal: Normal range of motion.         General: No tenderness.      Cervical back: Normal range of motion. Skin:     General: Skin is warm.   Neurological:      Mental Status: Alert and oriented to person, place, and time.      Deep Tendon Reflexes: Reflexes are normal and symmetric.   Psychiatric:         Judgment: Judgment normal.         Procedures    Interpretation Summary         Equivocal ECG evidence of myocardial ischemia.    Negative clinical evidence of myocardial ischemia.    Findings consistent with an equivocal ECG stress test.    Equivocal ECG evidence of myocardial ischemia. Negative clinical evidence of myocardial ischemia. Findings consistent with an equivocal ECG stress test    Interpretation Summary         WITH RARE PACS, PVCS, NSSVT       Left Ventriculography:     Estimated Ejection Fraction: 45%   Wall motion abnormalities: None    Mitral Regurgitation: None     Impression:    Normal left main  Ostial LAD 50% with haziness otherwise minimum irregularity  Circumflex nondominant and normal  Right coronary artery dominant and normal  Mild LV dysfunction EF 45%    Recommendations:    Medical management, further evaluation of the ostial LAD can be performed if the patient has further increase in troponins or the chest pains        I sincerely appreciate the opportunity to participate in your patient's care. Please feel free to contact me anytime if I can be of assistance in this or any other way.    Teo Orta MD  10/14/2023  12:14 EDT    Interpretation Summary         The left ventricular cavity is borderline dilated.    The following left ventricular wall segments are hypokinetic: mid anterior, apical anterior, mid anterolateral, apical lateral, mid inferolateral, apical inferior, mid inferior, apical septal, mid inferoseptal, apex hypokinetic and mid anteroseptal.    Left ventricular diastolic function is consistent with (grade I) impaired relaxation.    Estimated right ventricular systolic pressure from tricuspid regurgitation is mildly elevated (35-45 mmHg).      Current Outpatient Medications:     aspirin 81 MG EC tablet, Take 1 tablet by mouth Daily., Disp: 30 tablet, Rfl: 6    Calcium Carbonate (CALCIUM 600 PO), Take  by mouth 2 (Two) Times a Day., Disp: , Rfl:     Cholecalciferol (VITAMIN D3) 2000 UNITS tablet, Take 1 tablet by mouth Daily., Disp: , Rfl:     clopidogrel (PLAVIX) 75 MG tablet, Take 1 tablet by mouth Daily., Disp: 30 tablet, Rfl: 6    lisinopril (PRINIVIL,ZESTRIL) 40 MG tablet, Take 1 tablet by mouth Daily., Disp: 90 tablet, Rfl: 3    magnesium oxide (MAG-OX) 400 MG tablet, Take 1 tablet by mouth Daily., Disp: , Rfl:     metoprolol tartrate (LOPRESSOR) 25 MG tablet, Take 1 tablet by mouth Every 12 (Twelve) Hours., Disp: 30 tablet, Rfl: 6    nitroglycerin (NITROSTAT) 0.4 MG SL tablet, Place 1 tablet under the  tongue Every 5 (Five) Minutes As Needed for Chest Pain (Systolic BP Greater Than 100). Take no more than 3 doses in 15 minutes., Disp: 25 tablet, Rfl: 12    omeprazole (priLOSEC) 20 MG capsule, Take 1 capsule by mouth Daily., Disp: 30 capsule, Rfl: 4    pravastatin (PRAVACHOL) 20 MG tablet, Take 1 tablet by mouth Daily., Disp: 30 tablet, Rfl: 3    Probiotic Product (PROBIOTIC DAILY PO), Take 1 tablet by mouth 2 (Two) Times a Day., Disp: , Rfl:     traMADol (ULTRAM) 50 MG tablet, Take 1 tablet by mouth Every 8 (Eight) Hours As Needed for Moderate Pain (chronic pain meds for low back pain)., Disp: 60 tablet, Rfl: 4    Unable to find, 1 each 1 (One) Time. IB Valerio 2 Tables Daily, Disp: , Rfl:     vitamin B-12 (CYANOCOBALAMIN) 500 MCG tablet, Take 1 tablet by mouth Daily., Disp: , Rfl:     vitamin C (ASCORBIC ACID) 250 MG tablet, Take 2 tablets by mouth Daily., Disp: , Rfl:      Assessment:        Patient Active Problem List   Diagnosis    Visual impairment    Peptic ulceration    Low back pain    Kidney stone    Hypertension    Hyperlipidemia    Diverticulosis    Depression    Cancer    Atrial fibrillation    Arthritis    Anxiety    Human metapneumovirus (hMPV) pneumonia    Paroxysmal supraventricular tachycardia    Esophageal reflux    Ductal carcinoma in situ (DCIS) of right breast    Carotid artery stenosis    Family history of malignant neoplasm of gastrointestinal tract    Family history of malignant neoplasm of genital organ, other    Mitral annular calcification    Chronic anticoagulation    Dysphagia    Aortic insufficiency    Tricuspid regurgitation    Irritable bowel syndrome with diarrhea    NSTEMI (non-ST elevated myocardial infarction)    Coronary artery disease of bypass graft of native heart with stable angina pectoris               Plan:   1.  Coronary artery disease: switch to toprol 25 daily. Tmt equivocal but asymptomatic for chest pain. Continue aspirin and plavix. Discussed with Dr Orta will  need lexiscan stress test with concern for LAD ostial 50% stenosis    2. Paroxysmal atrial fibrillation: holter without afib. Off anticoagulation due to starting plavix and aspirin, will keep off AC for now. Declines loop. Continue metoprolol.     3. Cardiomyopathy: stop lisinopril, start entresto 24/26 after 36 hour washout. Add GDMT as tolerated her bp is soft. Will plan for repeat echo in 3 months.    4. Hyperlipidemia: she reports her PCP manages her cholesterol and labs, continue pravastatin.     Risk of the hyperlipidemia, importance of the treatment has been explained. Pros and cons of the statins has been explained. Regular blood workup as well as side effects including the liver failure, myelopathy death has been explained.           No diagnosis found.    There are no diagnoses linked to this encounter.    COUNSELING: leslie Yu was given to patient for the following topics: diagnostic results, risk factor reductions, impressions, risks and benefits of treatment options and importance of treatment compliance.        SMOKING COUNSELING: denies    Stop lisinopril, start entresto after 36hour wash out  Switch to toprol 25mg daily  Lexiscan stress test  Echo in 3 months  Follow up in 1 month with bmp    Sincerely,   SHANON Norwood  [facility]  11/28/23  14:57 EST    EMR Dragon/Transcription disclaimer:   Much of this encounter note is an electronic transcription/translation of spoken language to printed text. The electronic translation of spoken language may permit erroneous, or at times, nonsensical words or phrases to be inadvertently transcribed; Although I have reviewed the note for such errors, some may still exist.

## 2023-11-30 ENCOUNTER — TELEPHONE (OUTPATIENT)
Dept: CARDIOLOGY | Facility: CLINIC | Age: 78
End: 2023-11-30

## 2023-11-30 NOTE — TELEPHONE ENCOUNTER
Caller: Lili Ruiz    Relationship: Self    Best call back number: [phone]    What is the best time to reach you: ANY    Who are you requesting to speak with (clinical staff, provider,  specific staff member): STAFF    Do you know the name of the person who called: GEORGE    What was the call regarding: MEDICATION    Is it okay if the provider responds through MyChart:

## 2023-12-07 ENCOUNTER — HOSPITAL ENCOUNTER (OUTPATIENT)
Dept: CARDIOLOGY | Facility: HOSPITAL | Age: 78
Discharge: HOME | End: 2023-12-07
Payer: MEDICARE

## 2023-12-07 VITALS
HEIGHT: 63 IN | WEIGHT: 170 LBS | DIASTOLIC BLOOD PRESSURE: 74 MMHG | HEART RATE: 54 BPM | BODY MASS INDEX: 30.12 KG/M2 | SYSTOLIC BLOOD PRESSURE: 112 MMHG

## 2023-12-07 VITALS
WEIGHT: 169.75 LBS | HEIGHT: 63 IN | SYSTOLIC BLOOD PRESSURE: 141 MMHG | BODY MASS INDEX: 30.08 KG/M2 | HEART RATE: 55 BPM | DIASTOLIC BLOOD PRESSURE: 67 MMHG

## 2023-12-07 DIAGNOSIS — I25.10 CORONARY ARTERY DISEASE INVOLVING NATIVE CORONARY ARTERY OF NATIVE HEART WITHOUT ANGINA PECTORIS: ICD-10-CM

## 2023-12-07 DIAGNOSIS — I42.0 CARDIOMYOPATHY, DILATED: ICD-10-CM

## 2023-12-07 LAB
BH CV REST NUCLEAR ISOTOPE DOSE: 10.8 MCI
BH CV STRESS BP STAGE 1: NORMAL
BH CV STRESS BP STAGE 2: NORMAL
BH CV STRESS DURATION MIN STAGE 1: 3
BH CV STRESS DURATION MIN STAGE 2: 2
BH CV STRESS DURATION SEC STAGE 1: 11
BH CV STRESS DURATION SEC STAGE 2: 59
BH CV STRESS GRADE STAGE 1: 10
BH CV STRESS GRADE STAGE 2: 12
BH CV STRESS HR STAGE 1: 94
BH CV STRESS HR STAGE 2: 128
BH CV STRESS METS STAGE 1: 4.6
BH CV STRESS METS STAGE 2: 7.1
BH CV STRESS NUCLEAR ISOTOPE DOSE: 32.1 MCI
BH CV STRESS PROTOCOL 1: NORMAL
BH CV STRESS RECOVERY BP: NORMAL MMHG
BH CV STRESS RECOVERY HR: 75 BPM
BH CV STRESS SPEED STAGE 1: 1.7
BH CV STRESS SPEED STAGE 2: 2.5
BH CV STRESS STAGE 1: 1
BH CV STRESS STAGE 2: 2
LV EF NUC BP: 60 %
MAXIMAL PREDICTED HEART RATE: 144 BPM
PERCENT MAX PREDICTED HR: 88.89 %
STRESS BASELINE BP: NORMAL MMHG
STRESS BASELINE HR: 54 BPM
STRESS PERCENT HR: 105 %
STRESS POST ESTIMATED WORKLOAD: 7.1 METS
STRESS POST EXERCISE DUR MIN: 6 MIN
STRESS POST EXERCISE DUR SEC: 10 SEC
STRESS POST PEAK BP: NORMAL MMHG
STRESS POST PEAK HR: 128 BPM
STRESS TARGET HR: 122 BPM

## 2023-12-07 PROCEDURE — 93017 CV STRESS TEST TRACING ONLY: CPT

## 2023-12-07 PROCEDURE — 93306 TTE W/DOPPLER COMPLETE: CPT | Performed by: INTERNAL MEDICINE

## 2023-12-07 PROCEDURE — A9500 TC99M SESTAMIBI: HCPCS | Performed by: INTERNAL MEDICINE

## 2023-12-07 PROCEDURE — 0 TECHNETIUM SESTAMIBI: Performed by: INTERNAL MEDICINE

## 2023-12-07 PROCEDURE — 78452 HT MUSCLE IMAGE SPECT MULT: CPT

## 2023-12-07 PROCEDURE — 93306 TTE W/DOPPLER COMPLETE: CPT

## 2023-12-07 RX ADMIN — TECHNETIUM TC 99M SESTAMIBI 1 DOSE: 1 INJECTION INTRAVENOUS at 09:33

## 2023-12-07 RX ADMIN — TECHNETIUM TC 99M SESTAMIBI 1 DOSE: 1 INJECTION INTRAVENOUS at 07:08

## 2023-12-08 LAB
AORTIC DIMENSIONLESS INDEX: 0.7 (DI)
ASCENDING AORTA: 2.8 CM
BH CV ECHO MEAS - ACS: 1.9 CM
BH CV ECHO MEAS - AO MAX PG: 10.5 MMHG
BH CV ECHO MEAS - AO MEAN PG: 6 MMHG
BH CV ECHO MEAS - AO ROOT DIAM: 3.1 CM
BH CV ECHO MEAS - AO V2 MAX: 162 CM/SEC
BH CV ECHO MEAS - AO V2 VTI: 37.7 CM
BH CV ECHO MEAS - AVA(I,D): 1.88 CM2
BH CV ECHO MEAS - EDV(CUBED): 125 ML
BH CV ECHO MEAS - EDV(MOD-SP2): 72 ML
BH CV ECHO MEAS - EDV(MOD-SP4): 84 ML
BH CV ECHO MEAS - EF(MOD-BP): 57.4 %
BH CV ECHO MEAS - EF(MOD-SP2): 59.7 %
BH CV ECHO MEAS - EF(MOD-SP4): 57.1 %
BH CV ECHO MEAS - ESV(CUBED): 29.8 ML
BH CV ECHO MEAS - ESV(MOD-SP2): 29 ML
BH CV ECHO MEAS - ESV(MOD-SP4): 36 ML
BH CV ECHO MEAS - FS: 38 %
BH CV ECHO MEAS - IVS/LVPW: 1 CM
BH CV ECHO MEAS - IVSD: 1.1 CM
BH CV ECHO MEAS - LV MASS(C)D: 207.1 GRAMS
BH CV ECHO MEAS - LV MAX PG: 5.5 MMHG
BH CV ECHO MEAS - LV MEAN PG: 3 MMHG
BH CV ECHO MEAS - LV V1 MAX: 117 CM/SEC
BH CV ECHO MEAS - LV V1 VTI: 27.9 CM
BH CV ECHO MEAS - LVIDD: 5 CM
BH CV ECHO MEAS - LVIDS: 3.1 CM
BH CV ECHO MEAS - LVOT AREA: 2.5 CM2
BH CV ECHO MEAS - LVOT DIAM: 1.8 CM
BH CV ECHO MEAS - LVPWD: 1.1 CM
BH CV ECHO MEAS - MR MAX PG: 21 MMHG
BH CV ECHO MEAS - MR MAX VEL: 229 CM/SEC
BH CV ECHO MEAS - MV A DUR: 0.17 SEC
BH CV ECHO MEAS - MV A MAX VEL: 84.9 CM/SEC
BH CV ECHO MEAS - MV DEC SLOPE: 151 CM/SEC2
BH CV ECHO MEAS - MV DEC TIME: 0.31 SEC
BH CV ECHO MEAS - MV E MAX VEL: 53.8 CM/SEC
BH CV ECHO MEAS - MV E/A: 0.63
BH CV ECHO MEAS - MV MAX PG: 3.3 MMHG
BH CV ECHO MEAS - MV MEAN PG: 1 MMHG
BH CV ECHO MEAS - MV P1/2T: 144.9 MSEC
BH CV ECHO MEAS - MV V2 VTI: 37.8 CM
BH CV ECHO MEAS - MVA(P1/2T): 1.52 CM2
BH CV ECHO MEAS - MVA(VTI): 1.88 CM2
BH CV ECHO MEAS - PA ACC TIME: 0.12 SEC
BH CV ECHO MEAS - PA V2 MAX: 92.3 CM/SEC
BH CV ECHO MEAS - PI END-D VEL: 71.9 CM/SEC
BH CV ECHO MEAS - PULM A REVS DUR: 0.15 SEC
BH CV ECHO MEAS - PULM A REVS VEL: 42.2 CM/SEC
BH CV ECHO MEAS - PULM DIAS VEL: 34 CM/SEC
BH CV ECHO MEAS - PULM S/D: 1.19
BH CV ECHO MEAS - PULM SYS VEL: 40.3 CM/SEC
BH CV ECHO MEAS - QP/QS: 0.73
BH CV ECHO MEAS - RAP SYSTOLE: 8 MMHG
BH CV ECHO MEAS - RV MAX PG: 1.73 MMHG
BH CV ECHO MEAS - RV V1 MAX: 65.8 CM/SEC
BH CV ECHO MEAS - RV V1 VTI: 18.4 CM
BH CV ECHO MEAS - RVOT DIAM: 1.9 CM
BH CV ECHO MEAS - RVSP: 15 MMHG
BH CV ECHO MEAS - SV(LVOT): 71 ML
BH CV ECHO MEAS - SV(MOD-SP2): 43 ML
BH CV ECHO MEAS - SV(MOD-SP4): 48 ML
BH CV ECHO MEAS - SV(RVOT): 52.2 ML
BH CV ECHO MEAS - TR MAX PG: 6.2 MMHG
BH CV ECHO MEAS - TR MAX VEL: 124 CM/SEC
BH CV XLRA - RV BASE: 3.4 CM
BH CV XLRA - RV LENGTH: 7.3 CM
BH CV XLRA - RV MID: 3.7 CM
LEFT ATRIUM VOLUME INDEX: 24.4 ML/M2
SINUS: 2.7 CM
STJ: 2.17 CM

## 2023-12-22 ENCOUNTER — HOSPITAL ENCOUNTER (OUTPATIENT)
Dept: CARDIOLOGY | Facility: HOSPITAL | Age: 78
Discharge: HOME | End: 2023-12-22
Payer: MEDICARE

## 2023-12-22 DIAGNOSIS — I42.0 CARDIOMYOPATHY, DILATED: ICD-10-CM

## 2023-12-22 LAB
ANION GAP SERPL CALCULATED.3IONS-SCNC: 9 MMOL/L (ref 5–15)
BUN SERPL-MCNC: 15 MG/DL (ref 8–23)
BUN/CREAT SERPL: 16.7 (ref 7–25)
CALCIUM SPEC-SCNC: 10.2 MG/DL (ref 8.6–10.5)
CHLORIDE SERPL-SCNC: 103 MMOL/L (ref 98–107)
CO2 SERPL-SCNC: 30 MMOL/L (ref 22–29)
CREAT SERPL-MCNC: 0.9 MG/DL (ref 0.57–1)
EGFRCR SERPLBLD CKD-EPI 2021: 66.4 ML/MIN/1.73
GLUCOSE SERPL-MCNC: 92 MG/DL (ref 65–99)
POTASSIUM SERPL-SCNC: 5 MMOL/L (ref 3.5–5.2)
SODIUM SERPL-SCNC: 142 MMOL/L (ref 136–145)

## 2023-12-22 PROCEDURE — 80048 BASIC METABOLIC PNL TOTAL CA: CPT

## 2023-12-28 ENCOUNTER — OFFICE VISIT (OUTPATIENT)
Dept: CARDIOLOGY | Facility: CLINIC | Age: 78
End: 2023-12-28
Payer: MEDICARE

## 2023-12-28 VITALS
DIASTOLIC BLOOD PRESSURE: 62 MMHG | BODY MASS INDEX: 27.82 KG/M2 | HEART RATE: 56 BPM | HEIGHT: 63 IN | WEIGHT: 157 LBS | SYSTOLIC BLOOD PRESSURE: 116 MMHG

## 2023-12-28 DIAGNOSIS — I48.0 PAROXYSMAL ATRIAL FIBRILLATION: ICD-10-CM

## 2023-12-28 DIAGNOSIS — I10 PRIMARY HYPERTENSION: ICD-10-CM

## 2023-12-28 DIAGNOSIS — E78.00 PURE HYPERCHOLESTEROLEMIA: ICD-10-CM

## 2023-12-28 DIAGNOSIS — I25.10 CORONARY ARTERY DISEASE INVOLVING NATIVE CORONARY ARTERY OF NATIVE HEART WITHOUT ANGINA PECTORIS: Primary | ICD-10-CM

## 2023-12-28 PROCEDURE — 99214 OFFICE O/P EST MOD 30 MIN: CPT | Performed by: INTERNAL MEDICINE

## 2023-12-28 PROCEDURE — 3074F SYST BP LT 130 MM HG: CPT | Performed by: INTERNAL MEDICINE

## 2023-12-28 PROCEDURE — 3078F DIAST BP <80 MM HG: CPT | Performed by: INTERNAL MEDICINE

## 2023-12-28 NOTE — PROGRESS NOTES
1MO   Subjective:        Lili Ruiz is a 78 y.o. female who here for follow up    CC  Follow-up coronary artery disease atrial fibrillation hypertension hyperlipidemia  HPI  78 years old female with coronary artery disease hyperlipidemia hypertension atrial fibrillation here for the follow-up with no complaints of chest pains tightness heaviness or the pressure sensation     Problems Addressed this Visit          Cardiac and Vasculature    Hypertension    Hyperlipidemia    Atrial fibrillation     Other Visit Diagnoses       Coronary artery disease involving native coronary artery of native heart without angina pectoris    -  Primary          Diagnoses         Codes Comments    Coronary artery disease involving native coronary artery of native heart without angina pectoris    -  Primary ICD-10-CM: I25.10  ICD-9-CM: 414.01     Paroxysmal atrial fibrillation     ICD-10-CM: I48.0  ICD-9-CM: 427.31     Pure hypercholesterolemia     ICD-10-CM: E78.00  ICD-9-CM: 272.0     Primary hypertension     ICD-10-CM: I10  ICD-9-CM: 401.9           .    The following portions of the patient's history were reviewed and updated as appropriate: allergies, current medications, past family history, past medical history, past social history, past surgical history and problem list.    Past Medical History:   Diagnosis Date    Anxiety     Aortic valve insufficiency     Arthritis     Atrial fibrillation     Breast cancer     Cancer     Breast    Cataract     bilateral eyes    Depression     Diverticulosis     Human metapneumovirus (hMPV) pneumonia     Hyperlipidemia     Hypertension     Kidney stone     Low back pain     Non Q wave myocardial infarction 10/2023    Obesity     Peptic ulceration     Visual impairment      reports that she has quit smoking. She has never used smokeless tobacco. She reports that she does not drink alcohol and does not use drugs.   Family History   Problem Relation Age of Onset    Cancer Mother     Ovarian  "cancer Mother     Heart disease Father     Colon cancer Maternal Aunt    Study Impressions    A normal monitor study.       Interpretation Summary    Interpretation Summary         Left ventricular ejection fraction appears to be 56 - 60%.    Left ventricular diastolic function was normal.    Estimated right ventricular systolic pressure from tricuspid regurgitation is normal (<35 mmHg).       Findings consistent with an equivocal ECG stress test.    Left ventricular ejection fraction is normal (Calculated EF = 60%).    Myocardial perfusion imaging indicates a small-sized, mildly severe area of ischemia located in the apex and septal wall.    Impressions are consistent with an intermediate risk study.     Review of Systems  Constitutional: No wt loss, fever, fatigue  Gastrointestinal: No nausea, abdominal pain  Behavioral/Psych: No insomnia or anxiety   Cardiovascular no chest pains or tightness in the chest  Objective:       Physical Exam  /62   Pulse 56   Ht 160 cm (63\")   Wt 71.2 kg (157 lb)   BMI 27.81 kg/m²   General appearance: No acute changes   Neck: Trachea midline; NECK, supple, no thyromegaly or lymphadenopathy   Lungs: Normal size and shape, normal breath sounds, equal distribution of air, no rales and rhonchi   CV: S1-S2 regular, no murmurs, no rub, no gallop   Abdomen: Soft, nontender; no masses , no abnormal abdominal sounds   Extremities: No deformity , normal color , no peripheral edema   Skin: Normal temperature, turgor and texture; no rash, ulcers          Procedures      Echocardiogram:    Results for orders placed during the hospital encounter of 12/07/23    Adult Transthoracic Echo Complete W/ Cont if Necessary Per Protocol    Interpretation Summary    Left ventricular ejection fraction appears to be 56 - 60%.    Left ventricular diastolic function was normal.    Estimated right ventricular systolic pressure from tricuspid regurgitation is normal (<35 mmHg).          Current " Outpatient Medications:     aspirin 81 MG EC tablet, Take 1 tablet by mouth Daily., Disp: 30 tablet, Rfl: 6    Calcium Carbonate (CALCIUM 600 PO), Take  by mouth 2 (Two) Times a Day., Disp: , Rfl:     Cholecalciferol (VITAMIN D3) 2000 UNITS tablet, Take 1 tablet by mouth Daily., Disp: , Rfl:     clopidogrel (PLAVIX) 75 MG tablet, Take 1 tablet by mouth Daily., Disp: 30 tablet, Rfl: 6    magnesium oxide (MAG-OX) 400 MG tablet, Take 1 tablet by mouth Daily., Disp: , Rfl:     metoprolol succinate XL (TOPROL-XL) 25 MG 24 hr tablet, Take 1 tablet by mouth Daily., Disp: 30 tablet, Rfl: 11    nitroglycerin (NITROSTAT) 0.4 MG SL tablet, Place 1 tablet under the tongue Every 5 (Five) Minutes As Needed for Chest Pain (Systolic BP Greater Than 100). Take no more than 3 doses in 15 minutes., Disp: 25 tablet, Rfl: 12    omeprazole (priLOSEC) 20 MG capsule, Take 1 capsule by mouth Daily., Disp: 30 capsule, Rfl: 4    pravastatin (PRAVACHOL) 20 MG tablet, Take 1 tablet by mouth Daily., Disp: 30 tablet, Rfl: 3    Probiotic Product (PROBIOTIC DAILY PO), Take 1 tablet by mouth 2 (Two) Times a Day., Disp: , Rfl:     sacubitril-valsartan (Entresto) 24-26 MG tablet, Take 1 tablet by mouth 2 (Two) Times a Day., Disp: 60 tablet, Rfl: 11    traMADol (ULTRAM) 50 MG tablet, Take 1 tablet by mouth Every 8 (Eight) Hours As Needed for Moderate Pain (chronic pain meds for low back pain)., Disp: 60 tablet, Rfl: 4    Unable to find, 1 each 1 (One) Time. IB Valerio 2 Tables Daily, Disp: , Rfl:     vitamin B-12 (CYANOCOBALAMIN) 500 MCG tablet, Take 1 tablet by mouth Daily., Disp: , Rfl:     vitamin C (ASCORBIC ACID) 250 MG tablet, Take 2 tablets by mouth Daily., Disp: , Rfl:    Assessment:                Plan:          ICD-10-CM ICD-9-CM   1. Coronary artery disease involving native coronary artery of native heart without angina pectoris  I25.10 414.01   2. Paroxysmal atrial fibrillation  I48.0 427.31   3. Pure hypercholesterolemia  E78.00 272.0   4.  Primary hypertension  I10 401.9     1. Coronary artery disease involving native coronary artery of native heart without angina pectoris  No angina pectoris    2. Paroxysmal atrial fibrillation  Controlled    3. Pure hypercholesterolemia  Continue current treatment    4. Primary hypertension  Patient understands importance of blood pressure check at home which patient does regularly and the blood pressures are well under control to the level of less than 140/90        Cp appears non cardiac    Treat medically    See in 6 months  COUNSELING:    Lili Yu was given to patient for the following topics: diagnostic results, risk factor reductions, impressions, risks and benefits of treatment options and importance of treatment compliance .       SMOKING COUNSELING:        Dictated using Dragon dictation

## 2024-01-10 ENCOUNTER — APPOINTMENT (OUTPATIENT)
Dept: GENERAL RADIOLOGY | Facility: HOSPITAL | Age: 79
End: 2024-01-10
Payer: MEDICARE

## 2024-01-10 ENCOUNTER — HOSPITAL ENCOUNTER (INPATIENT)
Facility: HOSPITAL | Age: 79
LOS: 1 days | Discharge: HOME | End: 2024-01-12
Attending: EMERGENCY MEDICINE | Admitting: INTERNAL MEDICINE
Payer: MEDICARE

## 2024-01-10 DIAGNOSIS — I21.4 NSTEMI (NON-ST ELEVATED MYOCARDIAL INFARCTION): ICD-10-CM

## 2024-01-10 DIAGNOSIS — I25.708 CORONARY ARTERY DISEASE OF BYPASS GRAFT OF NATIVE HEART WITH STABLE ANGINA PECTORIS: ICD-10-CM

## 2024-01-10 DIAGNOSIS — R07.9 CHEST PAIN, UNSPECIFIED TYPE: ICD-10-CM

## 2024-01-10 DIAGNOSIS — R07.2 PRECORDIAL PAIN: ICD-10-CM

## 2024-01-10 DIAGNOSIS — Z86.79 HISTORY OF CORONARY ARTERY DISEASE: ICD-10-CM

## 2024-01-10 DIAGNOSIS — I48.91 ATRIAL FIBRILLATION, UNSPECIFIED TYPE: Primary | ICD-10-CM

## 2024-01-10 DIAGNOSIS — Z86.79 HISTORY OF HYPERTENSION: ICD-10-CM

## 2024-01-10 LAB
ALBUMIN SERPL-MCNC: 3.6 G/DL (ref 3.5–5.2)
ALBUMIN/GLOB SERPL: 1.7 G/DL
ALP SERPL-CCNC: 44 U/L (ref 39–117)
ALT SERPL W P-5'-P-CCNC: 12 U/L (ref 1–33)
ANION GAP SERPL CALCULATED.3IONS-SCNC: 9.3 MMOL/L (ref 5–15)
APTT PPP: 22.3 SECONDS (ref 22.7–35.4)
AST SERPL-CCNC: 14 U/L (ref 1–32)
BASOPHILS # BLD AUTO: 0.02 10*3/MM3 (ref 0–0.2)
BASOPHILS NFR BLD AUTO: 0.4 % (ref 0–1.5)
BILIRUB SERPL-MCNC: 0.5 MG/DL (ref 0–1.2)
BUN SERPL-MCNC: 17 MG/DL (ref 8–23)
BUN/CREAT SERPL: 20 (ref 7–25)
CALCIUM SPEC-SCNC: 9 MG/DL (ref 8.6–10.5)
CHLORIDE SERPL-SCNC: 111 MMOL/L (ref 98–107)
CO2 SERPL-SCNC: 23.7 MMOL/L (ref 22–29)
CREAT SERPL-MCNC: 0.85 MG/DL (ref 0.57–1)
DEPRECATED RDW RBC AUTO: 46.2 FL (ref 37–54)
EGFRCR SERPLBLD CKD-EPI 2021: 71.1 ML/MIN/1.73
EOSINOPHIL # BLD AUTO: 0.04 10*3/MM3 (ref 0–0.4)
EOSINOPHIL NFR BLD AUTO: 0.7 % (ref 0.3–6.2)
ERYTHROCYTE [DISTWIDTH] IN BLOOD BY AUTOMATED COUNT: 13 % (ref 12.3–15.4)
GEN 5 2HR TROPONIN T REFLEX: 34 NG/L
GLOBULIN UR ELPH-MCNC: 2.1 GM/DL
GLUCOSE SERPL-MCNC: 116 MG/DL (ref 65–99)
HCT VFR BLD AUTO: 41.2 % (ref 34–46.6)
HGB BLD-MCNC: 13.2 G/DL (ref 12–15.9)
IMM GRANULOCYTES # BLD AUTO: 0.01 10*3/MM3 (ref 0–0.05)
IMM GRANULOCYTES NFR BLD AUTO: 0.2 % (ref 0–0.5)
INR PPP: 0.99 (ref 0.9–1.1)
LYMPHOCYTES # BLD AUTO: 2.53 10*3/MM3 (ref 0.7–3.1)
LYMPHOCYTES NFR BLD AUTO: 46.7 % (ref 19.6–45.3)
MAGNESIUM SERPL-MCNC: 2 MG/DL (ref 1.6–2.4)
MCH RBC QN AUTO: 31.4 PG (ref 26.6–33)
MCHC RBC AUTO-ENTMCNC: 32 G/DL (ref 31.5–35.7)
MCV RBC AUTO: 97.9 FL (ref 79–97)
MONOCYTES # BLD AUTO: 0.4 10*3/MM3 (ref 0.1–0.9)
MONOCYTES NFR BLD AUTO: 7.4 % (ref 5–12)
NEUTROPHILS NFR BLD AUTO: 2.42 10*3/MM3 (ref 1.7–7)
NEUTROPHILS NFR BLD AUTO: 44.6 % (ref 42.7–76)
NRBC BLD AUTO-RTO: 0 /100 WBC (ref 0–0.2)
NT-PROBNP SERPL-MCNC: 1145 PG/ML (ref 0–1800)
NT-PROBNP SERPL-MCNC: 141 PG/ML (ref 0–1800)
PLATELET # BLD AUTO: 183 10*3/MM3 (ref 140–450)
PMV BLD AUTO: 9.8 FL (ref 6–12)
POTASSIUM SERPL-SCNC: 3.8 MMOL/L (ref 3.5–5.2)
PROT SERPL-MCNC: 5.7 G/DL (ref 6–8.5)
PROTHROMBIN TIME: 13.2 SECONDS (ref 11.7–14.2)
QT INTERVAL: 407 MS
QTC INTERVAL: 504 MS
RBC # BLD AUTO: 4.21 10*6/MM3 (ref 3.77–5.28)
SODIUM SERPL-SCNC: 144 MMOL/L (ref 136–145)
TROPONIN T DELTA: 12 NG/L
TROPONIN T SERPL HS-MCNC: 22 NG/L
TSH SERPL DL<=0.05 MIU/L-ACNC: 3.77 UIU/ML (ref 0.27–4.2)
WBC NRBC COR # BLD AUTO: 5.42 10*3/MM3 (ref 3.4–10.8)

## 2024-01-10 PROCEDURE — 83735 ASSAY OF MAGNESIUM: CPT | Performed by: EMERGENCY MEDICINE

## 2024-01-10 PROCEDURE — 80053 COMPREHEN METABOLIC PANEL: CPT | Performed by: EMERGENCY MEDICINE

## 2024-01-10 PROCEDURE — G0378 HOSPITAL OBSERVATION PER HR: HCPCS

## 2024-01-10 PROCEDURE — 83880 ASSAY OF NATRIURETIC PEPTIDE: CPT | Performed by: EMERGENCY MEDICINE

## 2024-01-10 PROCEDURE — 85730 THROMBOPLASTIN TIME PARTIAL: CPT | Performed by: EMERGENCY MEDICINE

## 2024-01-10 PROCEDURE — 25010000002 AMIODARONE IN DEXTROSE 5% 150-4.21 MG/100ML-% SOLUTION: Performed by: INTERNAL MEDICINE

## 2024-01-10 PROCEDURE — 99222 1ST HOSP IP/OBS MODERATE 55: CPT | Performed by: INTERNAL MEDICINE

## 2024-01-10 PROCEDURE — 84484 ASSAY OF TROPONIN QUANT: CPT | Performed by: EMERGENCY MEDICINE

## 2024-01-10 PROCEDURE — 36415 COLL VENOUS BLD VENIPUNCTURE: CPT

## 2024-01-10 PROCEDURE — 25810000003 SODIUM CHLORIDE 0.9 % SOLUTION: Performed by: EMERGENCY MEDICINE

## 2024-01-10 PROCEDURE — 71045 X-RAY EXAM CHEST 1 VIEW: CPT

## 2024-01-10 PROCEDURE — 83880 ASSAY OF NATRIURETIC PEPTIDE: CPT | Performed by: HOSPITALIST

## 2024-01-10 PROCEDURE — 93005 ELECTROCARDIOGRAM TRACING: CPT | Performed by: NURSE PRACTITIONER

## 2024-01-10 PROCEDURE — 84443 ASSAY THYROID STIM HORMONE: CPT | Performed by: NURSE PRACTITIONER

## 2024-01-10 PROCEDURE — 25810000003 SODIUM CHLORIDE 0.9 % SOLUTION: Performed by: NURSE PRACTITIONER

## 2024-01-10 PROCEDURE — 85025 COMPLETE CBC W/AUTO DIFF WBC: CPT | Performed by: EMERGENCY MEDICINE

## 2024-01-10 PROCEDURE — 85610 PROTHROMBIN TIME: CPT | Performed by: EMERGENCY MEDICINE

## 2024-01-10 PROCEDURE — 93010 ELECTROCARDIOGRAM REPORT: CPT | Performed by: INTERNAL MEDICINE

## 2024-01-10 PROCEDURE — 25010000002 ENOXAPARIN PER 10 MG

## 2024-01-10 PROCEDURE — 25010000002 AMIODARONE IN DEXTROSE 5% 360-4.14 MG/200ML-% SOLUTION: Performed by: INTERNAL MEDICINE

## 2024-01-10 PROCEDURE — 99291 CRITICAL CARE FIRST HOUR: CPT

## 2024-01-10 RX ORDER — ASPIRIN 81 MG/1
81 TABLET ORAL DAILY
Status: DISCONTINUED | OUTPATIENT
Start: 2024-01-10 | End: 2024-01-10

## 2024-01-10 RX ORDER — NITROGLYCERIN 0.4 MG/1
0.4 TABLET SUBLINGUAL
Status: DISCONTINUED | OUTPATIENT
Start: 2024-01-10 | End: 2024-01-11

## 2024-01-10 RX ORDER — PANTOPRAZOLE SODIUM 40 MG/1
40 TABLET, DELAYED RELEASE ORAL
Status: DISCONTINUED | OUTPATIENT
Start: 2024-01-11 | End: 2024-01-12 | Stop reason: HOSPADM

## 2024-01-10 RX ORDER — SODIUM CHLORIDE 0.9 % (FLUSH) 0.9 %
10 SYRINGE (ML) INJECTION AS NEEDED
Status: DISCONTINUED | OUTPATIENT
Start: 2024-01-10 | End: 2024-01-12 | Stop reason: HOSPADM

## 2024-01-10 RX ORDER — METOPROLOL SUCCINATE 25 MG/1
25 TABLET, EXTENDED RELEASE ORAL DAILY
Status: DISCONTINUED | OUTPATIENT
Start: 2024-01-10 | End: 2024-01-12 | Stop reason: HOSPADM

## 2024-01-10 RX ORDER — PRAVASTATIN SODIUM 80 MG/1
80 TABLET ORAL DAILY
COMMUNITY
End: 2024-01-12 | Stop reason: HOSPADM

## 2024-01-10 RX ORDER — MAGNESIUM OXIDE 400 MG/1
400 TABLET ORAL DAILY
Status: DISCONTINUED | OUTPATIENT
Start: 2024-01-10 | End: 2024-01-12 | Stop reason: HOSPADM

## 2024-01-10 RX ORDER — SODIUM CHLORIDE 9 MG/ML
40 INJECTION, SOLUTION INTRAVENOUS AS NEEDED
Status: DISCONTINUED | OUTPATIENT
Start: 2024-01-10 | End: 2024-01-12 | Stop reason: HOSPADM

## 2024-01-10 RX ORDER — ASPIRIN 325 MG
325 TABLET ORAL ONCE
Status: COMPLETED | OUTPATIENT
Start: 2024-01-10 | End: 2024-01-10

## 2024-01-10 RX ORDER — PANTOPRAZOLE SODIUM 40 MG/1
40 TABLET, DELAYED RELEASE ORAL DAILY
COMMUNITY

## 2024-01-10 RX ORDER — PRAVASTATIN SODIUM 40 MG
80 TABLET ORAL DAILY
Status: DISCONTINUED | OUTPATIENT
Start: 2024-01-10 | End: 2024-01-12 | Stop reason: HOSPADM

## 2024-01-10 RX ORDER — ENOXAPARIN SODIUM 100 MG/ML
1 INJECTION SUBCUTANEOUS EVERY 12 HOURS
Status: DISCONTINUED | OUTPATIENT
Start: 2024-01-10 | End: 2024-01-11

## 2024-01-10 RX ORDER — SODIUM CHLORIDE 0.9 % (FLUSH) 0.9 %
10 SYRINGE (ML) INJECTION EVERY 12 HOURS SCHEDULED
Status: DISCONTINUED | OUTPATIENT
Start: 2024-01-10 | End: 2024-01-12 | Stop reason: HOSPADM

## 2024-01-10 RX ORDER — CLOPIDOGREL BISULFATE 75 MG/1
75 TABLET ORAL DAILY
Status: DISCONTINUED | OUTPATIENT
Start: 2024-01-10 | End: 2024-01-12 | Stop reason: HOSPADM

## 2024-01-10 RX ORDER — ASPIRIN 81 MG/1
81 TABLET ORAL DAILY
Status: DISCONTINUED | OUTPATIENT
Start: 2024-01-11 | End: 2024-01-12 | Stop reason: HOSPADM

## 2024-01-10 RX ADMIN — MAGNESIUM OXIDE 400 MG (241.3 MG MAGNESIUM) TABLET 400 MG: TABLET at 15:07

## 2024-01-10 RX ADMIN — SODIUM CHLORIDE 5 MG/HR: 900 INJECTION, SOLUTION INTRAVENOUS at 09:28

## 2024-01-10 RX ADMIN — Medication 10 ML: at 20:39

## 2024-01-10 RX ADMIN — ASPIRIN 325 MG: 325 TABLET ORAL at 05:50

## 2024-01-10 RX ADMIN — AMIODARONE HYDROCHLORIDE 150 MG: 1.5 INJECTION, SOLUTION INTRAVENOUS at 10:15

## 2024-01-10 RX ADMIN — SODIUM CHLORIDE 2 MG/HR: 900 INJECTION, SOLUTION INTRAVENOUS at 08:28

## 2024-01-10 RX ADMIN — ENOXAPARIN SODIUM 70 MG: 100 INJECTION SUBCUTANEOUS at 14:40

## 2024-01-10 RX ADMIN — SODIUM CHLORIDE 500 ML: 9 INJECTION, SOLUTION INTRAVENOUS at 05:50

## 2024-01-10 RX ADMIN — AMIODARONE HYDROCHLORIDE 1 MG/MIN: 1.8 INJECTION, SOLUTION INTRAVENOUS at 10:45

## 2024-01-10 RX ADMIN — SACUBITRIL AND VALSARTAN 1 TABLET: 24; 26 TABLET, FILM COATED ORAL at 20:38

## 2024-01-10 RX ADMIN — AMIODARONE HYDROCHLORIDE 1 MG/MIN: 1.8 INJECTION, SOLUTION INTRAVENOUS at 16:09

## 2024-01-10 RX ADMIN — CLOPIDOGREL BISULFATE 75 MG: 75 TABLET, FILM COATED ORAL at 15:07

## 2024-01-10 RX ADMIN — SODIUM CHLORIDE 1000 ML: 9 INJECTION, SOLUTION INTRAVENOUS at 08:28

## 2024-01-10 RX ADMIN — PRAVASTATIN SODIUM 80 MG: 40 TABLET ORAL at 15:06

## 2024-01-10 RX ADMIN — METOPROLOL SUCCINATE 25 MG: 25 TABLET, EXTENDED RELEASE ORAL at 15:06

## 2024-01-10 NOTE — ED PROVIDER NOTES
MD ATTESTATION NOTE    SHARED VISIT: This visit was performed by BOTH a physician and an APC. The substantive portion of the medical decision making was performed by this attesting physician who made or approved the management plan and takes responsibility for patient management. All studies in the APC note (if performed) were independently interpreted by me.     The MICHAEL and I have discussed this patient's history, physical exam, and treatment plan.  I have reviewed the documentation and affirm the documentation and agree with the treatment and plan.  The attached note describes my personal findings.      Independent Historians: Patient    A complete HPI/ROS/PMH/PSH/SH/FH are unobtainable due to: None    Chronic or social conditions impacting patient care (social determinants of health): None    Lili Ruiz is a 78 y.o. female with history of atrial fibrillation in the past as well as coronary artery disease status post bypass surgery, hypertension, hyperlipidemia who presents to the ED c/o acute chest pain that started this morning along with heart racing and lightheadedness.  Patient reports a history of atrial fibrillation and previously on flecainide.  She was taken off of her flecainide couple of months ago and had no issues with recurrence of A-fib until now.  She denies any new infectious symptoms.  She denies any current chest pain.          On exam:  GENERAL: Pleasant cooperative and conversant female, alert, no acute distress  SKIN: Warm, dry  HENT: Normocephalic, atraumatic  EYES: no scleral icterus, EOMI, no conjunctivitis  CV: irregular rhythm, regular rate  RESPIRATORY: normal effort, diminished at the bases with no wheezing and no rhonchi  ABDOMEN: soft, nontender, nondistended  MUSCULOSKELETAL: no deformity  NEURO: alert, moves all extremities, follows commands                                                             Labs  Recent Results (from the past 24 hour(s))   ECG 12 Lead Rhythm Change     Collection Time: 01/10/24  5:38 AM   Result Value Ref Range    QT Interval 407 ms    QTC Interval 504 ms   Protime-INR    Collection Time: 01/10/24  5:42 AM    Specimen: Blood   Result Value Ref Range    Protime 13.2 11.7 - 14.2 Seconds    INR 0.99 0.90 - 1.10   aPTT    Collection Time: 01/10/24  5:42 AM    Specimen: Blood   Result Value Ref Range    PTT 22.3 (L) 22.7 - 35.4 seconds   BNP    Collection Time: 01/10/24  5:42 AM    Specimen: Blood   Result Value Ref Range    proBNP 141.0 0.0 - 1,800.0 pg/mL   CBC Auto Differential    Collection Time: 01/10/24  5:42 AM    Specimen: Blood   Result Value Ref Range    WBC 5.42 3.40 - 10.80 10*3/mm3    RBC 4.21 3.77 - 5.28 10*6/mm3    Hemoglobin 13.2 12.0 - 15.9 g/dL    Hematocrit 41.2 34.0 - 46.6 %    MCV 97.9 (H) 79.0 - 97.0 fL    MCH 31.4 26.6 - 33.0 pg    MCHC 32.0 31.5 - 35.7 g/dL    RDW 13.0 12.3 - 15.4 %    RDW-SD 46.2 37.0 - 54.0 fl    MPV 9.8 6.0 - 12.0 fL    Platelets 183 140 - 450 10*3/mm3    Neutrophil % 44.6 42.7 - 76.0 %    Lymphocyte % 46.7 (H) 19.6 - 45.3 %    Monocyte % 7.4 5.0 - 12.0 %    Eosinophil % 0.7 0.3 - 6.2 %    Basophil % 0.4 0.0 - 1.5 %    Immature Grans % 0.2 0.0 - 0.5 %    Neutrophils, Absolute 2.42 1.70 - 7.00 10*3/mm3    Lymphocytes, Absolute 2.53 0.70 - 3.10 10*3/mm3    Monocytes, Absolute 0.40 0.10 - 0.90 10*3/mm3    Eosinophils, Absolute 0.04 0.00 - 0.40 10*3/mm3    Basophils, Absolute 0.02 0.00 - 0.20 10*3/mm3    Immature Grans, Absolute 0.01 0.00 - 0.05 10*3/mm3    nRBC 0.0 0.0 - 0.2 /100 WBC   Comprehensive Metabolic Panel    Collection Time: 01/10/24  6:12 AM    Specimen: Blood   Result Value Ref Range    Glucose 116 (H) 65 - 99 mg/dL    BUN 17 8 - 23 mg/dL    Creatinine 0.85 0.57 - 1.00 mg/dL    Sodium 144 136 - 145 mmol/L    Potassium 3.8 3.5 - 5.2 mmol/L    Chloride 111 (H) 98 - 107 mmol/L    CO2 23.7 22.0 - 29.0 mmol/L    Calcium 9.0 8.6 - 10.5 mg/dL    Total Protein 5.7 (L) 6.0 - 8.5 g/dL    Albumin 3.6 3.5 - 5.2 g/dL    ALT  "(SGPT) 12 1 - 33 U/L    AST (SGOT) 14 1 - 32 U/L    Alkaline Phosphatase 44 39 - 117 U/L    Total Bilirubin 0.5 0.0 - 1.2 mg/dL    Globulin 2.1 gm/dL    A/G Ratio 1.7 g/dL    BUN/Creatinine Ratio 20.0 7.0 - 25.0    Anion Gap 9.3 5.0 - 15.0 mmol/L    eGFR 71.1 >60.0 mL/min/1.73   High Sensitivity Troponin T    Collection Time: 01/10/24  6:12 AM    Specimen: Blood   Result Value Ref Range    HS Troponin T 22 (H) <14 ng/L   Magnesium    Collection Time: 01/10/24  6:12 AM    Specimen: Blood   Result Value Ref Range    Magnesium 2.0 1.6 - 2.4 mg/dL   TSH    Collection Time: 01/10/24  6:12 AM    Specimen: Blood   Result Value Ref Range    TSH 3.770 0.270 - 4.200 uIU/mL   High Sensitivity Troponin T 2Hr    Collection Time: 01/10/24  8:09 AM    Specimen: Blood   Result Value Ref Range    HS Troponin T 34 (H) <14 ng/L    Troponin T Delta 12 (C) >=-4 - <+4 ng/L   BNP    Collection Time: 01/10/24  3:45 PM    Specimen: Blood   Result Value Ref Range    proBNP 1,145.0 0.0 - 1,800.0 pg/mL       Radiology  XR Chest 1 View    Result Date: 1/10/2024  SINGLE VIEW OF THE CHEST  HISTORY: Chest pain  COMPARISON: October 13, 2023  FINDINGS: There is cardiomegaly. There is no vascular congestion. No pneumothorax, pleural effusion, or acute infiltrate is seen. There is stable elevation of the right hemidiaphragm. There is evidence of prior granulomatous disease.      No acute findings.  This report was finalized on 1/10/2024 5:57 AM by Dr. Janelle Guerrero M.D on Workstation: [id number]       Medical Decision Making:  ED Course as of 01/10/24 1846   Wed Barrett 10, 2024   0540 First look: Patient with history of paroxysmal A-fib, LBBB, CAD presents having awoken from sleep with chest pain.  Per EMS on arrival she was diaphoretic and was in A-fib RVR rate of 150.  They also relate that her \"blood pressure was soft \".  Patient was given a dose of diltiazem and 500 cc of fluid prior to arrival in ED.  That rate was controlled on arrival.  " Patient denies shortness of breath no chest pain at present.  States that she was previously on medication to control her A-fib which was discontinued in October. [TJ]   0706 HS Troponin T(!): 22 []   0806 Phone call with dr. santoro.  Discussed the patient, relevant history, exam, diagnostics, ED findings/progress, and concerns. They agree to admit to a tele obs floor, recommends dilt drip if her HR is >100 again   []   0810 Systolic blood pressure 88, heart rate 122, diltiazem drip ordered []   1628 XR Chest 1 View  Per my independent interpretation is no pneumothorax, no focal infiltrate [AH]   1629 ECG 12 Lead Rhythm Change  Per my independent interpretation is atrial fibrillation with rate of 92, QTc 504, no evidence for acute ischemia appreciated []   1630 MDM: Patient here with chest pain and irregular heart rate suggestive of A-fib RVR, blood pressure is a little on the soft side so dill drip ordered with IV fluid bolus with hopeful patient conversion and resumption of normotension.  Unfortunately patient had some elevation of her troponins and she endorsed chest discomfort.  Will defer to cardiology expertise to decide if this is from demand or if this is an acute ischemic event.  Patient's EKG does not suggest ACS though. []      ED Course User Index  [] Sofie Vega APRN  [] Dave Sanchez MD       MDM: This patient presents with chest pain with a history suggestive of possible ACS. No evidence of volume overload or shock on exam. EKG without signs of active ischemia although she has new A-fib. EKG without evidence of STEMI. I have a low suspicion for acute PE (no pleuritic pain and no shortness of breath), pneumothorax, thoracic aortic dissection, pericardial effusion, pneumonia/infection, etc. Overall, ACS is being considered given higher risk features on this patient's history and physical exam and consideration of HEART score.    This patient will require admission for  inpatient risk stratification and possible provocative testing. Plan for cardiac monitoring, serum labs including troponin, chest xray (+/- CT chest), aspirin, pain control, (+/- lovenox/heparain), reassessment, and cardiology consult and likely admission. Patient is aware and amenable to plan.    ___________  EKG ER MD interpretation   Time: 5: 38  Rhythm and rate: Atrial fibrillation at a rate of 92  Axis: Normal  QRS complexes: Wide QRS at 137 ms and pattern consistent with left bundle branch block  T waves: no flattening or inversions  On comparison EKG done October 13, 2023 patient's left bundle branch block was noted to be new at that time but she was in a sinus rhythm so atrial fibrillation is new today  ____________  I viewed patient's chest x-ray and on my interpretation patient has clear lungs and normal heart size with elevation of the right hemidiaphragm this been present in the past.    _____________  Heart score calculation:    History slightly suspicious: 0  History moderately suspicious: +1  History highly suspicious: +2    EKG normal: 0  EKG non-specific repolarization disturbance: +1  EKG sig ST deviation: +2    Age <45: 0  Age 45-64: +1  Age >65: +2    No known risk factors: 0  1-2 risk factors: +1  3 or more risk factors: +2    Initial troponin less than the normal limit: 0  Initial troponin 1-3 times the normal limit: +1  Initial troponin greater than 3 times the normal limit: +2    Total score: 6  _____________    Patient's initial labs significant for a troponin of 22.  Will trend that with repeat troponin    Patient's heart rate initially rate controlled A-fib now with some rate increases between 10 5-1 30.  Plan for Cardizem drip and admission.  Will have to watch patient's blood pressure closely as her blood pressures have been soft with a systolic rate around 100.    Procedures:  Critical Care    Performed by: Nikkie Joshi MD  Authorized by: Nikkie Joshi MD    Critical  care provider statement:     Critical care time (minutes):  45    Critical care time was exclusive of:  Separately billable procedures and treating other patients    Critical care was necessary to treat or prevent imminent or life-threatening deterioration of the following conditions:  Circulatory failure and cardiac failure    Critical care was time spent personally by me on the following activities:  Development of treatment plan with patient or surrogate, evaluation of patient's response to treatment, obtaining history from patient or surrogate, examination of patient, ordering and review of laboratory studies, ordering and review of radiographic studies, pulse oximetry, re-evaluation of patient's condition and review of old charts  Comments:      Initiation of critical medication including Cardizem drip with continuous monitoring for response.  Patient in atrial fibrillation with RVR with very soft blood pressures including some readings 90s over 60s.          PPE: I followed hospital protocols for proper PPE based on patient presentation including use of N95 mask for suspected infectious respiratory conditions.  Proper hand hygiene was performed both before and after the patient encounter.          Diagnosis  Final diagnoses:   Atrial fibrillation, unspecified type   Chest pain, unspecified type   History of coronary artery disease   History of hypertension       Note Disclaimer: At [facility], we believe that sharing information builds trust and better relationships. You are receiving this note because you recently visited [facility]. It is possible you will see health information before a provider has talked with you about it. This kind of information can be easy to misunderstand. To help you fully understand what it means for your health, we urge you to discuss this note with your provider.       Nikkie Joshi MD  01/10/24 1322       Nikkie Joshi MD  01/10/24 7601

## 2024-01-10 NOTE — PLAN OF CARE
Goal Outcome Evaluation:           Progress: improving  Outcome Evaluation: Pt new admit from ED this shift. Pt remains in Afib on amiodarone gtt. Denies pain. Standby assist to bathroom.

## 2024-01-10 NOTE — PROGRESS NOTES
Clinical Pharmacy Services: Medication History    Lili Ruiz is a 78 y.o. female presenting to [facility] for   Chief Complaint   Patient presents with    Atrial Fibrillation       She  has a past medical history of Anxiety, Aortic valve insufficiency, Arthritis, Atrial fibrillation, Breast cancer, Cancer, Cataract, Depression, Diverticulosis, Human metapneumovirus (hMPV) pneumonia, Hyperlipidemia, Hypertension, Kidney stone, Low back pain, Non Q wave myocardial infarction (10/2023), Obesity, Peptic ulceration, and Visual impairment.    Allergies as of 01/10/2024 - Reviewed 01/10/2024   Allergen Reaction Noted    Penicillins Shortness Of Breath and Itching 01/25/2021    Hydrocodone-acetaminophen  07/09/2016    Sotalol hcl  02/01/2013       Medication information was obtained from: Patient/Pharmacy  Pharmacy and Phone Number:   [id number] - [place], [place] - [address] - [phone]  - [phone]   [address] [address]  Phone: [phone] Fax: [phone]    [facility] [place]  [address] [address]  Phone: [phone] Fax: [phone]        Prior to Admission Medications       Prescriptions Last Dose Informant Patient Reported? Taking?    aspirin 81 MG EC tablet  Self No Yes    Take 1 tablet by mouth Daily.    Calcium Carbonate (CALCIUM 600 PO)  Self Yes Yes    Take 1 tablet by mouth 2 (Two) Times a Day.    Cholecalciferol (VITAMIN D3) 2000 UNITS tablet  Self Yes Yes    Take 1 tablet by mouth Daily.    clopidogrel (PLAVIX) 75 MG tablet  Self, Pharmacy No Yes    Take 1 tablet by mouth Daily.    magnesium oxide (MAG-OX) 400 MG tablet  Self, Pharmacy Yes Yes    Take 1 tablet by mouth Daily.    metoprolol succinate XL (TOPROL-XL) 25 MG 24 hr tablet  Self, Pharmacy No Yes    Take 1 tablet by mouth Daily.    omeprazole (priLOSEC) 20 MG capsule  Pharmacy, Self No Yes    Take 1  capsule by mouth Daily.    pantoprazole (PROTONIX) 40 MG EC tablet  Self, Pharmacy Yes Yes    Take 1 tablet by mouth Daily.    pravastatin (PRAVACHOL) 80 MG tablet  Self, Pharmacy Yes Yes    Take 1 tablet by mouth Daily.    Probiotic Product (PROBIOTIC DAILY PO)  Self Yes Yes    Take 1 tablet by mouth 2 (Two) Times a Day.    sacubitril-valsartan (Entresto) 24-26 MG tablet  Self, Pharmacy No Yes    Take 1 tablet by mouth 2 (Two) Times a Day.    Unable to find  Self Yes Yes    Take 1 each by mouth 1 (One) Time. IB Valerio 2 Tables Daily    vitamin B-12 (CYANOCOBALAMIN) 500 MCG tablet  Self Yes Yes    Take 1 tablet by mouth Daily.    vitamin C (ASCORBIC ACID) 250 MG tablet  Self Yes Yes    Take 2 tablets by mouth Daily.    nitroglycerin (NITROSTAT) 0.4 MG SL tablet  Self, Pharmacy No No    Place 1 tablet under the tongue Every 5 (Five) Minutes As Needed for Chest Pain (Systolic BP Greater Than 100). Take no more than 3 doses in 15 minutes.    pravastatin (PRAVACHOL) 20 MG tablet  Pharmacy No No    Take 1 tablet by mouth Daily.    traMADol (ULTRAM) 50 MG tablet   No No    Take 1 tablet by mouth Every 8 (Eight) Hours As Needed for Moderate Pain (chronic pain meds for low back pain).              Medication notes:     This medication list is complete to the best of my knowledge as of 1/10/2024    Please call if questions.    Derek May  Medication History Technician  [phone]    1/10/2024 08:46 EST

## 2024-01-10 NOTE — PROGRESS NOTES
"[facility] Clinical Pharmacy Services: Enoxaparin Consult    Lili Ruiz has a pharmacy consult to dose full-dose enoxaparin per SHANON Chu's request.     Indication: A Fib - requiring full anticoagulation  Home Anticoagulation: None - pt previously on Eliquis but was stopped sometime in 2023 per chart notes     Relevant clinical data and objective history reviewed:  78 y.o. female 160 cm (63\") 72.1 kg (159 lb)   Body mass index is 28.17 kg/m².   Results from last 7 days   Lab Units 01/10/24  0542   PLATELETS 10*3/mm3 183     Estimated Creatinine Clearance: 53.6 mL/min (by C-G formula based on SCr of 0.85 mg/dL).    Assessment/Plan  -Will start patient on enoxaparin  70mg (1mg/kg) subcutaneous every 12 hours based on weight and renal function.   -Consult order will be discontinued but pharmacy will continue to follow.     Nikki Deluna, PharmD, BCPS, [facility]  Clinical Pharmacy Specialist, Emergency Medicine   Phone: [phone]      "

## 2024-01-10 NOTE — Clinical Note
Hemostasis started on the right radial artery. R-Band was used in achieving hemostasis. Radial compression device applied to vessel. Hemostasis achieved successfully. Closure device additional comment: TR band- 13 cc of air.

## 2024-01-10 NOTE — ED NOTES
"Nursing report ED to floor  Lili Ruiz  78 y.o.  female    HPI :   Chief Complaint   Patient presents with    Atrial Fibrillation       Admitting doctor:   Teo Orta MD    Admitting diagnosis:   The primary encounter diagnosis was Atrial fibrillation, unspecified type. Diagnoses of Chest pain, unspecified type, History of coronary artery disease, and History of hypertension were also pertinent to this visit.    Code status:   Current Code Status       Date Active Code Status Order ID Comments User Context       Prior            Allergies:   Penicillins, Hydrocodone-acetaminophen, and Sotalol hcl    Isolation:   No active isolations    Intake and Output    Intake/Output Summary (Last 24 hours) at 1/10/2024 0818  Last data filed at 1/10/2024 0624  Gross per 24 hour   Intake 1000 ml   Output --   Net 1000 ml       Weight:       01/10/24  0539   Weight: 72.1 kg (159 lb)       Most recent vitals:   Vitals:    01/10/24 0539   BP: 90/60   Pulse: 84   Resp: 18   Temp: 97.7 °F (36.5 °C)   TempSrc: Oral   SpO2: 92%   Weight: 72.1 kg (159 lb)   Height: 160 cm (63\")       Active LDAs/IV Access:   Lines, Drains & Airways       Active LDAs       Name Placement date Placement time Site Days    Peripheral IV 01/10/24 0536 Right Antecubital 01/10/24  0536  Antecubital  less than 1                    Labs (abnormal labs have a star):   Labs Reviewed   COMPREHENSIVE METABOLIC PANEL - Abnormal; Notable for the following components:       Result Value    Glucose 116 (*)     Chloride 111 (*)     Total Protein 5.7 (*)     All other components within normal limits    Narrative:     GFR Normal >60  Chronic Kidney Disease <60  Kidney Failure <15    The GFR formula is only valid for adults with stable renal function between ages 18 and 70.   APTT - Abnormal; Notable for the following components:    PTT 22.3 (*)     All other components within normal limits   TROPONIN - Abnormal; Notable for the following components:    HS " Troponin T 22 (*)     All other components within normal limits    Narrative:     High Sensitive Troponin T Reference Range:  <14.0 ng/L- Negative Female for AMI  <22.0 ng/L- Negative Male for AMI  >=14 - Abnormal Female indicating possible myocardial injury.  >=22 - Abnormal Male indicating possible myocardial injury.   Clinicians would have to utilize clinical acumen, EKG, Troponin, and serial changes to determine if it is an Acute Myocardial Infarction or myocardial injury due to an underlying chronic condition.        CBC WITH AUTO DIFFERENTIAL - Abnormal; Notable for the following components:    MCV 97.9 (*)     Lymphocyte % 46.7 (*)     All other components within normal limits   PROTIME-INR - Normal   BNP (IN-HOUSE) - Normal    Narrative:     This assay is used as an aid in the diagnosis of individuals suspected of having heart failure. It can be used as an aid in the diagnosis of acute decompensated heart failure (ADHF) in patients presenting with signs and symptoms of ADHF to the emergency department (ED). In addition, NT-proBNP of <300 pg/mL indicates ADHF is not likely.    Age Range Result Interpretation  NT-proBNP Concentration (pg/mL:      <50             Positive            >450                   Gray                 300-450                    Negative             <300    50-75           Positive            >900                  Gray                300-900                  Negative            <300      >75             Positive            >1800                  Gray                300-1800                  Negative            <300   MAGNESIUM - Normal   TSH - Normal   HIGH SENSITIVITIY TROPONIN T 2HR   CBC AND DIFFERENTIAL    Narrative:     The following orders were created for panel order CBC & Differential.  Procedure                               Abnormality         Status                     ---------                               -----------         ------                     CBC Auto  Differential[[id number]]        Abnormal            Final result                 Please view results for these tests on the individual orders.       EKG:   ECG 12 Lead Rhythm Change   Preliminary Result   HEART RATE= 92  bpm   RR Interval= 652  ms   AZ Interval=   ms   P Horizontal Axis=   deg   P Front Axis=   deg   QRSD Interval= 137  ms   QT Interval= 407  ms   QTcB= 504  ms   QRS Axis= -36  deg   T Wave Axis= 93  deg   - ABNORMAL ECG -   Atrial fibrillation   Left bundle branch block   Electronically Signed By:    Date and Time of Study: 2024-01-10 05:38:44          Meds given in ED:   Medications   sodium chloride 0.9 % flush 10 mL (has no administration in time range)   sodium chloride 0.9 % bolus 1,000 mL (has no administration in time range)   dilTIAZem (CARDIZEM) 100 mg in 100 mL NS infusion (ADV) (has no administration in time range)   sodium chloride 0.9 % bolus 500 mL (0 mL Intravenous Stopped 1/10/24 0624)   aspirin tablet 325 mg (325 mg Oral Given 1/10/24 0550)       Imaging results:  XR Chest 1 View    Result Date: 1/10/2024  No acute findings.  This report was finalized on 1/10/2024 5:57 AM by Dr. Janelle Guerrero M.D on Workstation: [id number]       Ambulatory status:   Partial assist    Social issues:   Social History     Socioeconomic History    Marital status:    Tobacco Use    Smoking status: Former    Smokeless tobacco: Never    Tobacco comments:     50 yrs ago   Vaping Use    Vaping Use: Never used   Substance and Sexual Activity    Alcohol use: No    Drug use: Never    Sexual activity: Defer     Partners: Male     Birth control/protection: Post-menopausal       NIH Stroke Scale:       Mary Lou Muñiz RN  01/10/24 08:18 EST

## 2024-01-10 NOTE — Clinical Note
First balloon inflation max pressure = 12 hailey. First balloon inflation duration = 8 seconds. Second inflation of balloon - Max pressure = 12 hailey. 2nd Inflation of balloon - Duration = 8 seconds.

## 2024-01-10 NOTE — H&P
[facility]  History & Physical Note                                                                                    Patient Identification:  Lili Ruiz:   78 y.o.  female  1945  [id number]            Chief Complaint   Patient presents with    Atrial Fibrillation       Date of Admission:1/10/2024    Admitting Physician: Dr Orta    Reason for Admission:Atrial fibrillation [I48.91]  History of hypertension [Z86.79]  History of coronary artery disease [Z86.79]  Atrial fibrillation, unspecified type [I48.91]  Chest pain, unspecified type [R07.9],   Chief Complaint   Patient presents with    Atrial Fibrillation       History of Present Illness:     This is a 78-year-old female who is well-known to our service with paroxysmal atrial fibrillation, coronary artery disease, hypertension, hyperlipidemia.  She presented to [facility] ER via EMS with complaints of chest pain heart racing and lightheadedness.  EMS reported A-fib with RVR upon their arrival and gave diltiazem as well as IV fluids.  ECG in ER Atrial fibrillation with left bundle branch block.  Initial high-sensitivity troponin 22, repeat 34.  CMP relatively unremarkable other than glucose 116, chloride 111.  CBC relatively unremarkable.  Chest x-ray without acute findings.  She was admitted for further management.    She was admitted 10/13/2023 through 10/15/2023 with NSTEMI.  At that time Plavix was added therefore Eliquis was stopped temporarily due to concern with over anticoagulation and she was sent home on ZIO to evaluate for A-fib.  Noted at that time flecainide was stopped due to left bundle branch block and coronary artery disease.  She was discharged on metoprolol.    She was seen in office on 11/28/2023 at that time did not have chest pain but due to LAD 50% narrowing recommended Lexiscan to evaluate for ischemia. Stress test 12/7/2023 myocardial perfusion image indicated a small sized, mildly severe  ischemia located in the apex and septal wall.  She was seen 12/28/2023 and recommended medical management.    Echo 10/14/2023 EF 25 to 30%, LV wall motion abnormality, grade 1 LV diastolic dysfunction, mildly elevated RVSP.  Cardiac catheterization 10/14/2023 normal left main, LAD at the ostial level of 50% narrowing with haziness, minimum regularity distally.  Circumflex nondominant normal.  RCA dominant normal.  LV gram shows mild LV dysfunction EF 45%.  Holter monitor 10/15/2023 rare PACs, PVCs, NS SVTs.  No definitive A-fib.    Cardiac Risk Factors:    Past Medical History:  Past Medical History:   Diagnosis Date    Anxiety     Aortic valve insufficiency     Arthritis     Atrial fibrillation     Breast cancer     Cancer     Breast    Cataract     bilateral eyes    Depression     Diverticulosis     Human metapneumovirus (hMPV) pneumonia     Hyperlipidemia     Hypertension     Kidney stone     Low back pain     Non Q wave myocardial infarction 10/2023    Obesity     Peptic ulceration     Visual impairment     at least 3 stable    Past Surgical History:  Past Surgical History:   Procedure Laterality Date    APPENDECTOMY      BREAST BIOPSY      BREAST SURGERY      CARDIAC CATHETERIZATION N/A 10/14/2023    Procedure: Left Heart Cath;  Surgeon: Teo Orta MD;  Location:  [facility];  Service: Cardiovascular;  Laterality: N/A;    CARDIAC CATHETERIZATION N/A 10/14/2023    Procedure: Coronary angiography;  Surgeon: Teo Orta MD;  Location:  [facility];  Service: Cardiovascular;  Laterality: N/A;    CARDIAC CATHETERIZATION N/A 10/14/2023    Procedure: Left ventriculography;  Surgeon: Teo Orta MD;  Location:  [facility];  Service: Cardiovascular;  Laterality: N/A;    CHOLECYSTECTOMY      COLONOSCOPY  appox 2014    COLONOSCOPY W/ POLYPECTOMY N/A 03/10/2021    Procedure: COLONOSCOPY  TO CECUM WITH BIOPSY AND POLYPECTOMY (COLD SNARE);   Surgeon: Joel Galan MD;  Location:  [facility];  Service: Gastroenterology;  Laterality: N/A;   DIARRHEA  POST: COLON POLYPS , HEMORRHOIDS  (COLD SNARE SUPPLY SAMPLE USED)    ENDOSCOPY N/A 03/10/2021    Procedure: ESOPHAGOGASTRODUODENOSCOPY WITH BIOPSY;  Surgeon: Joel Galan MD;  Location:  [facility];  Service: Gastroenterology;  Laterality: N/A;  DYSPHAGIA; DIARRHEA  POST: SCHIATZKI'S RING; GASTRIC NODULE    GASTRIC BYPASS      MASTECTOMY Right 2015        Social History:   Social History     Tobacco Use    Smoking status: Former    Smokeless tobacco: Never    Tobacco comments:     50 yrs ago   Substance Use Topics    Alcohol use: No        Family History:  Family History   Problem Relation Age of Onset    Cancer Mother     Ovarian cancer Mother     Heart disease Father     Colon cancer Maternal Aunt           Allergies:  Allergies   Allergen Reactions    Penicillins Shortness Of Breath and Itching    Hydrocodone-Acetaminophen     Sotalol Hcl        Home Meds:  No current facility-administered medications on file prior to encounter.     Current Outpatient Medications on File Prior to Encounter   Medication Sig Dispense Refill    aspirin 81 MG EC tablet Take 1 tablet by mouth Daily. 30 tablet 6    Calcium Carbonate (CALCIUM 600 PO) Take 1 tablet by mouth 2 (Two) Times a Day.      Cholecalciferol (VITAMIN D3) 2000 UNITS tablet Take 1 tablet by mouth Daily.      clopidogrel (PLAVIX) 75 MG tablet Take 1 tablet by mouth Daily. 30 tablet 6    magnesium oxide (MAG-OX) 400 MG tablet Take 1 tablet by mouth Daily.      metoprolol succinate XL (TOPROL-XL) 25 MG 24 hr tablet Take 1 tablet by mouth Daily. 30 tablet 11    omeprazole (priLOSEC) 20 MG capsule Take 1 capsule by mouth Daily. 30 capsule 4    pantoprazole (PROTONIX) 40 MG EC tablet Take 1 tablet by mouth Daily.      pravastatin (PRAVACHOL) 80 MG tablet Take 1 tablet by mouth Daily.      Probiotic Product (PROBIOTIC DAILY PO) Take 1  "tablet by mouth 2 (Two) Times a Day.      sacubitril-valsartan (Entresto) 24-26 MG tablet Take 1 tablet by mouth 2 (Two) Times a Day. 60 tablet 11    Unable to find Take 1 each by mouth 1 (One) Time. IB Valerio 2 Tables Daily      vitamin B-12 (CYANOCOBALAMIN) 500 MCG tablet Take 1 tablet by mouth Daily.      vitamin C (ASCORBIC ACID) 250 MG tablet Take 2 tablets by mouth Daily.      nitroglycerin (NITROSTAT) 0.4 MG SL tablet Place 1 tablet under the tongue Every 5 (Five) Minutes As Needed for Chest Pain (Systolic BP Greater Than 100). Take no more than 3 doses in 15 minutes. 25 tablet 12    pravastatin (PRAVACHOL) 20 MG tablet Take 1 tablet by mouth Daily. 30 tablet 3    traMADol (ULTRAM) 50 MG tablet Take 1 tablet by mouth Every 8 (Eight) Hours As Needed for Moderate Pain (chronic pain meds for low back pain). 60 tablet 4         Scheduled Meds:  enoxaparin, 1 mg/kg, Q12H            INTAKE AND OUTPUT:    Intake/Output Summary (Last 24 hours) at 1/10/2024 1404  Last data filed at 1/10/2024 0624  Gross per 24 hour   Intake 1000 ml   Output --   Net 1000 ml           Review of Systems  Constitutional: No wt loss, fever   Gastrointestinal: No nausea , abdominal pain  Behavioral/Psych: No insomnia or anxiety   Cardiovascular ----positive for sob. All other systems reviewed and are negative        Constitutional:  Temp:  [97.1 °F (36.2 °C)-97.7 °F (36.5 °C)] 97.1 °F (36.2 °C)  Heart Rate:  [] 118  Resp:  [18] 18  BP: ()/(58-94) 113/68    Physical Exam:           Physical Exam  /76   Pulse 99   Temp 97.1 °F (36.2 °C)   Resp 18   Ht 160 cm (63\")   Wt 73.1 kg (161 lb 2.5 oz)   SpO2 92%   BMI 28.55 kg/m²     General appearance: No acute changes   Eyes: Sclerae conjunctivae normal, pupils reactive   HENT: Atraumatic; oropharynx clear with moist mucous membranes and no mucosal ulcerations;  Neck: Trachea midline; NECK, supple, no thyromegaly or lymphadenopathy   Lungs: Normal size and shape, normal " breath sounds, equal distribution of air, no rales and rhonchi   CV: S1-S2 irregular, no murmurs, no rub, no gallop   Abdomen: Soft, nontender; no masses , no abnormal abdominal sounds   Extremities: No deformity , normal color , no peripheral edema   Skin: Normal temperature, turgor and texture; no rash, ulcers  Psych: Appropriate affect, alert and oriented to person, place and time                                Cardiographics    ECG:           ECHO:    Interpretation Summary         Left ventricular ejection fraction appears to be 56 - 60%.    Left ventricular diastolic function was normal.    Estimated right ventricular systolic pressure from tricuspid regurgitation is normal (<35 mmHg).    Stress test 12/2023  Interpretation Summary         Findings consistent with an equivocal ECG stress test.    Left ventricular ejection fraction is normal (Calculated EF = 60%).    Myocardial perfusion imaging indicates a small-sized, mildly severe area of ischemia located in the apex and septal wall.    Impressions are consistent with an intermediate risk study.      Cath 10/2023  Coronary Angiography    Left Main: The left main originates in the left coronary cusp. It bifurcates and gives rise to the LAD and circumflex system. Normal left main    Left Anterior Descending: Left anterior descending at the ostial level 50% narrowing with haziness, minimum irregularity distally    Left Circumflex: Nondominant and normal    Right Coronary Artery: The right coronary artery originates in the right coronary cusp. Dominant and normal    Left Ventriculography:     Estimated Ejection Fraction: 45%   Wall motion abnormalities: None   Mitral Regurgitation: None     Impression:    Normal left main  Ostial LAD 50% with haziness otherwise minimum irregularity  Circumflex nondominant and normal  Right coronary artery dominant and normal  Mild LV dysfunction EF 45%    Recommendations:    Medical management, further evaluation of the ostial  "LAD can be performed if the patient has further increase in troponins or the chest pains        I sincerely appreciate the opportunity to participate in your patient's care. Please feel free to contact me anytime if I can be of assistance in this or any other way.    Teo Orta MD  10/14/2023  12:14 EDT        Lab Review:  Results from last 7 days   Lab Units 01/10/24  0809 01/10/24  0612   HSTROP T ng/L 34* 22*     Results from last 7 days   Lab Units 01/10/24  0612   MAGNESIUM mg/dL 2.0     Results from last 7 days   Lab Units 01/10/24  0612   SODIUM mmol/L 144   POTASSIUM mmol/L 3.8   BUN mg/dL 17   CREATININE mg/dL 0.85   CALCIUM mg/dL 9.0     @LABRCNTbnp@  Results from last 7 days   Lab Units 01/10/24  0542   WBC 10*3/mm3 5.42   HEMOGLOBIN g/dL 13.2   HEMATOCRIT % 41.2   PLATELETS 10*3/mm3 183     Results from last 7 days   Lab Units 01/10/24  0542   INR  0.99   APTT seconds 22.3*         CXR:  IMPRESSION:  No acute findings.     This report was finalized on 1/10/2024 5:57 AM by Dr. Janelle Guerrero M.D on Workstation: [id number]     Assessment / Plan:    Coronary artery disease  Paroxysmal atrial fibrillation with RVR  Cardiomyopathy  Hyperlipidemia        Recommendations:    This is a 78-year-old female known with our service admitted for A-fib with RVR.  She also had some chest pain with known history of CAD, NSTEMI in October 2023.  Cardiac catheterization at that time with some ostial LAD 50% narrowing.  Continue aspirin and Plavix.  Start anticoagulation with Lovenox pharmacy to dose.  Resume home meds.  She has an onset of symptoms of 4 AM we will start IV amiodarone per protocol.  Consult IM.  Will need to determine cardioversion as well as cardiac catheterization/PCI.    Ecg, bmp, cbc, trop, consult im    Teo Orta MD      1/10/2024  14:04 EST    EMR Dragon/Transcription:   \"Dictated utilizing Dragon dictation\".     "

## 2024-01-10 NOTE — CASE MANAGEMENT/SOCIAL WORK
Discharge Planning Assessment  [facility]     Patient Name: Lili Ruiz  MRN: [id number]  Today's Date: 1/10/2024    Admit Date: 1/10/2024    Plan: home with spouse   Discharge Needs Assessment       Row Name 01/10/24 1359       Living Environment    People in Home spouse    Current Living Arrangements home    Potentially Unsafe Housing Conditions none    Primary Care Provided by self    Provides Primary Care For no one    Family Caregiver if Needed spouse    Quality of Family Relationships helpful;involved    Able to Return to Prior Arrangements yes       Resource/Environmental Concerns    Resource/Environmental Concerns home accessibility    Home Accessibility Concerns stairs to enter home;stairs to access bedroom or bathroom       Transition Planning    Patient/Family Anticipates Transition to home with family    Patient/Family Anticipated Services at Transition none    Transportation Anticipated family or friend will provide       Discharge Needs Assessment    Readmission Within the Last 30 Days no previous admission in last 30 days    Equipment Currently Used at Home none    Concerns to be Addressed denies needs/concerns at this time;no discharge needs identified    Anticipated Changes Related to Illness none    Equipment Needed After Discharge none    Provided Post Acute Provider List? Refused    Refused Provider List Comment declined need                   Discharge Plan       Row Name 01/10/24 1400       Plan    Plan home with spouse    Patient/Family in Agreement with Plan yes    Plan Comments Spoke with pt and pt's spouse bedside. Confirmed facesheet correct. Explained role of CCP. pt reports she lives with her spouse in a 3 story house with 3 steps to enter and bedroom on second level. Pt reports she is IADLs no DME used and still drives. Pt reports she has no HH or SNF history. Her PCP is Dr. Davis and uses [facility] with no issues. She plans home with spouse assist at d/c. CCP to follow.                   Continued Care and Services - Admitted Since 1/10/2024    Coordination has not been started for this encounter.          Demographic Summary    No documentation.                  Functional Status    No documentation.                  Psychosocial    No documentation.                  Abuse/Neglect    No documentation.                  Legal    No documentation.                  Substance Abuse    No documentation.                  Patient Forms    No documentation.                     DEANGELO Healy

## 2024-01-10 NOTE — Clinical Note
PT stated she finished up her lunch around 1330 pm. Dr. Orta called and notfied. Per MD, okay to proceed with left heart cath.

## 2024-01-10 NOTE — CONSULTS
Internal medicine consult    Referring physician  Dr. BARTH     Chief complaint  Atrial fibrillation    Reason for consult  Follow medical problems    History of present illness  78-year-old white female with history of paroxysmal atrial fibrillation coronary artery disease hypertension hyperlipidemia admitted through emergency room to the observation unit with palpitation dizziness and thought she is current passed out.  Patient also have some shortness of breath but no chest pain.  Patient workup revealed rapid ventricular rate with history of atrial fibrillation and received diltiazem IV fluid and now admitted to cardiology service and I am asked to follow patient for medical problem.  At the time of examination she looks anxious but no complaints.  Patient denies any fever chills abdominal pain nausea vomiting diarrhea.    Past Medical History:             Diagnosis Date    Anxiety      Aortic valve insufficiency      Arthritis      Atrial fibrillation      Breast cancer      Cancer       Breast    Cataract       bilateral eyes    Depression      Diverticulosis      Human metapneumovirus (hMPV) pneumonia      Hyperlipidemia      Hypertension      Kidney stone      Low back pain      Non Q wave myocardial infarction 10/2023    Obesity      Peptic ulceration      Visual impairment           Past Surgical History:              Procedure Laterality Date    APPENDECTOMY        BREAST BIOPSY        BREAST SURGERY        CARDIAC CATHETERIZATION N/A 10/14/2023     Procedure: Left Heart Cath;  Surgeon: Teo Orta MD;  Location: [facility] CATH INVASIVE LOCATION;  Service: Cardiovascular;  Laterality: N/A;    CARDIAC CATHETERIZATION N/A 10/14/2023     Procedure: Coronary angiography;  Surgeon: Teo Orta MD;  Location: [facility] CATH INVASIVE LOCATION;  Service: Cardiovascular;  Laterality: N/A;    CARDIAC CATHETERIZATION N/A 10/14/2023     Procedure: Left ventriculography;  Surgeon: Teo Orta  MD;  Location: [facility] CATH INVASIVE LOCATION;  Service: Cardiovascular;  Laterality: N/A;    CHOLECYSTECTOMY        COLONOSCOPY   appox 2014    COLONOSCOPY W/ POLYPECTOMY N/A 03/10/2021     Procedure: COLONOSCOPY  TO CECUM WITH BIOPSY AND POLYPECTOMY (COLD SNARE);  Surgeon: Joel Galan MD;  Location: [facility] ENDOSCOPY;  Service: Gastroenterology;  Laterality: N/A;   DIARRHEA  POST: COLON POLYPS , HEMORRHOIDS  (COLD SNARE SUPPLY SAMPLE USED)    ENDOSCOPY N/A 03/10/2021     Procedure: ESOPHAGOGASTRODUODENOSCOPY WITH BIOPSY;  Surgeon: Joel Galan MD;  Location: [facility] ENDOSCOPY;  Service: Gastroenterology;  Laterality: N/A;  DYSPHAGIA; DIARRHEA  POST: SCHIATZKI'S RING; GASTRIC NODULE    GASTRIC BYPASS        MASTECTOMY Right 2015         Social History:               Tobacco Use    Smoking status: Former    Smokeless tobacco: Never    Tobacco comments:       50 yrs ago   Substance Use Topics    Alcohol use: No      Family History:           Problem Relation Age of Onset    Cancer Mother      Ovarian cancer Mother      Heart disease Father      Colon cancer Maternal Aunt        Allergies:          Allergen Reactions    Penicillins Shortness Of Breath and Itching    Hydrocodone-Acetaminophen      Sotalol Hcl       Home Meds: Reviewed    Review of Systems:  Constitutional: No weakness,fatigue, fever, chills   Eyes: No eye pain, vision changes   ENT/oropharynx: No difficulty swallowing, no epistaxis, no changes in hearing   Cardiovascular: No chest pain, chest tightness, palpitations, paroxysmal nocturnal dyspnea, orthopnea, diaphoresis, dizziness / syncopal episode   Respiratory: No shortness of breath, dyspnea on exertion, cough, wheezing hemoptysis   Gastrointestinal: No abdominal pain, nausea, vomiting, diarrhea, bloody stools   Genitourinary: No hematuria, dysuria   Neurological: No headache, numbness, tingling, one-sided weakness    Musculoskeletal: No cramps, joint pain   Integument: No rash,  "edema      Physical Exam:   Blood pressure 106/74, pulse 114, temperature 97.1 °F (36.2 °C), resp. rate 18, height 160 cm (63\"), weight 73.1 kg (161 lb 2.5 oz), SpO2 92%.    GENERAL: Pleasant cooperative and conversant female, alert, no acute distress  SKIN: Warm, dry  HENT: Normocephalic, atraumatic  EYES: no scleral icterus, EOMI, no conjunctivitis  CV: irregular rhythm, regular rate  RESPIRATORY: normal effort, diminished at the bases with no wheezing and no rhonchi  ABDOMEN: soft, nontender, nondistended bowel sounds positive  MUSCULOSKELETAL: no deformity  NEURO: alert, moves all extremities, follows commands    LABS  Lab Results (last 24 hours)       Procedure Component Value Units Date/Time    High Sensitivity Troponin T 2Hr [[id number]]  (Abnormal) Collected: 01/10/24 0809    Specimen: Blood Updated: 01/10/24 0845     HS Troponin T 34 ng/L      Troponin T Delta 12 ng/L     Narrative:      High Sensitive Troponin T Reference Range:  <14.0 ng/L- Negative Female for AMI  <22.0 ng/L- Negative Male for AMI  >=14 - Abnormal Female indicating possible myocardial injury.  >=22 - Abnormal Male indicating possible myocardial injury.   Clinicians would have to utilize clinical acumen, EKG, Troponin, and serial changes to determine if it is an Acute Myocardial Infarction or myocardial injury due to an underlying chronic condition.         TSH [[id number]]  (Normal) Collected: 01/10/24 0612    Specimen: Blood Updated: 01/10/24 0717     TSH 3.770 uIU/mL     Comprehensive Metabolic Panel [[id number]]  (Abnormal) Collected: 01/10/24 0612    Specimen: Blood Updated: 01/10/24 0702     Glucose 116 mg/dL      BUN 17 mg/dL      Creatinine 0.85 mg/dL      Sodium 144 mmol/L      Potassium 3.8 mmol/L      Comment: Slight hemolysis detected by analyzer. Result may be falsely elevated.        Chloride 111 mmol/L      CO2 23.7 mmol/L      Calcium 9.0 mg/dL      Total Protein 5.7 g/dL      Albumin 3.6 g/dL      ALT (SGPT) 12 U/L      AST " (SGOT) 14 U/L      Alkaline Phosphatase 44 U/L      Total Bilirubin 0.5 mg/dL      Globulin 2.1 gm/dL      A/G Ratio 1.7 g/dL      BUN/Creatinine Ratio 20.0     Anion Gap 9.3 mmol/L      eGFR 71.1 mL/min/1.73     Narrative:      GFR Normal >60  Chronic Kidney Disease <60  Kidney Failure <15    The GFR formula is only valid for adults with stable renal function between ages 18 and 70.    Magnesium [[id number]]  (Normal) Collected: 01/10/24 0612    Specimen: Blood Updated: 01/10/24 0702     Magnesium 2.0 mg/dL     High Sensitivity Troponin T [[id number]]  (Abnormal) Collected: 01/10/24 0612    Specimen: Blood Updated: 01/10/24 0701     HS Troponin T 22 ng/L     Narrative:      High Sensitive Troponin T Reference Range:  <14.0 ng/L- Negative Female for AMI  <22.0 ng/L- Negative Male for AMI  >=14 - Abnormal Female indicating possible myocardial injury.  >=22 - Abnormal Male indicating possible myocardial injury.   Clinicians would have to utilize clinical acumen, EKG, Troponin, and serial changes to determine if it is an Acute Myocardial Infarction or myocardial injury due to an underlying chronic condition.         Protime-INR [[id number]]  (Normal) Collected: 01/10/24 0542    Specimen: Blood Updated: 01/10/24 0606     Protime 13.2 Seconds      INR 0.99    aPTT [[id number]]  (Abnormal) Collected: 01/10/24 0542    Specimen: Blood Updated: 01/10/24 0606     PTT 22.3 seconds     BNP [[id number]]  (Normal) Collected: 01/10/24 0542    Specimen: Blood Updated: 01/10/24 0605     proBNP 141.0 pg/mL     Narrative:      This assay is used as an aid in the diagnosis of individuals suspected of having heart failure. It can be used as an aid in the diagnosis of acute decompensated heart failure (ADHF) in patients presenting with signs and symptoms of ADHF to the emergency department (ED). In addition, NT-proBNP of <300 pg/mL indicates ADHF is not likely.    Age Range Result Interpretation  NT-proBNP Concentration (pg/mL:      <50              Positive            >450                   Gray                 300-450                    Negative             <300    50-75           Positive            >900                  Gray                300-900                  Negative            <300      >75             Positive            >1800                  Gray                300-1800                  Negative            <300    CBC & Differential [[id number]]  (Abnormal) Collected: 01/10/24 0542    Specimen: Blood Updated: 01/10/24 0550    Narrative:      The following orders were created for panel order CBC & Differential.  Procedure                               Abnormality         Status                     ---------                               -----------         ------                     CBC Auto Differential[[id number]]        Abnormal            Final result                 Please view results for these tests on the individual orders.    CBC Auto Differential [[id number]]  (Abnormal) Collected: 01/10/24 0542    Specimen: Blood Updated: 01/10/24 0550     WBC 5.42 10*3/mm3      RBC 4.21 10*6/mm3      Hemoglobin 13.2 g/dL      Hematocrit 41.2 %      MCV 97.9 fL      MCH 31.4 pg      MCHC 32.0 g/dL      RDW 13.0 %      RDW-SD 46.2 fl      MPV 9.8 fL      Platelets 183 10*3/mm3      Neutrophil % 44.6 %      Lymphocyte % 46.7 %      Monocyte % 7.4 %      Eosinophil % 0.7 %      Basophil % 0.4 %      Immature Grans % 0.2 %      Neutrophils, Absolute 2.42 10*3/mm3      Lymphocytes, Absolute 2.53 10*3/mm3      Monocytes, Absolute 0.40 10*3/mm3      Eosinophils, Absolute 0.04 10*3/mm3      Basophils, Absolute 0.02 10*3/mm3      Immature Grans, Absolute 0.01 10*3/mm3      nRBC 0.0 /100 WBC           Imaging Results (Last 24 Hours)       Procedure Component Value Units Date/Time    XR Chest 1 View [[id number]] Collected: 01/10/24 0557     Updated: 01/10/24 0600    Narrative:      SINGLE VIEW OF THE CHEST     HISTORY: Chest pain     COMPARISON: October 13,  2023     FINDINGS:  There is cardiomegaly. There is no vascular congestion. No pneumothorax,  pleural effusion, or acute infiltrate is seen. There is stable elevation  of the right hemidiaphragm. There is evidence of prior granulomatous  disease.       Impression:      No acute findings.     This report was finalized on 1/10/2024 5:57 AM by Dr. Janelle Guerrero M.D on Workstation: [id number]             Results  Scan on 1/10/2024 0706 by [facility]: ECG 12-LEAD         Author: -- Service: -- Author Type: --   Filed: Date of Service: Creation Time:   Status: (Other)   HEART RATE= 92  bpm  RR Interval= 652  ms  HI Interval=   ms  P Horizontal Axis=   deg  P Front Axis=   deg  QRSD Interval= 137  ms  QT Interval= 407  ms  QTcB= 504  ms  QRS Axis= -36  deg  T Wave Axis= 93  deg  - ABNORMAL ECG -  Atrial fibrillation - new  Left bundle branch block          Current Facility-Administered Medications:     [COMPLETED] amiodarone 150 mg in 100 mL D5W (loading dose), 150 mg, Intravenous, Once, Stopped at 01/10/24 1025 **FOLLOWED BY** amiodarone 360 mg in 200 mL D5W infusion, 1 mg/min, Intravenous, Continuous, Last Rate: 33.3 mL/hr at 01/10/24 1415, 1 mg/min at 01/10/24 1415 **FOLLOWED BY** amiodarone 360 mg in 200 mL D5W infusion, 0.5 mg/min, Intravenous, Continuous, Teo Orta MD    [START ON 1/11/2024] aspirin EC tablet 81 mg, 81 mg, Oral, Daily, Megan Carmona, APRN    clopidogrel (PLAVIX) tablet 75 mg, 75 mg, Oral, Daily, Megan Carmona APRN, 75 mg at 01/10/24 1507    Enoxaparin Sodium (LOVENOX) syringe 70 mg, 1 mg/kg, Subcutaneous, Q12H, Megan Carmona, APRN, 70 mg at 01/10/24 1440    magnesium oxide (MAG-OX) tablet 400 mg, 400 mg, Oral, Daily, Megan Carmona APRN, 400 mg at 01/10/24 1507    metoprolol succinate XL (TOPROL-XL) 24 hr tablet 25 mg, 25 mg, Oral, Daily, Megan Carmona, SHANON, 25 mg at 01/10/24 1506    nitroglycerin (NITROSTAT) SL tablet 0.4 mg, 0.4 mg, Sublingual, Q5 Min  PRN, Megan Carmona APRN    [START ON 1/11/2024] pantoprazole (PROTONIX) EC tablet 40 mg, 40 mg, Oral, Q AM, Megan Carmona APRN    pravastatin (PRAVACHOL) tablet 80 mg, 80 mg, Oral, Daily, Megan Carmona APRN, 80 mg at 01/10/24 1506    sacubitril-valsartan (ENTRESTO) 24-26 MG tablet 1 tablet, 1 tablet, Oral, BID, Megan Carmona APRN    [COMPLETED] Insert Peripheral IV, , , Once **AND** sodium chloride 0.9 % flush 10 mL, 10 mL, Intravenous, PRN, Dave Sanchez MD    sodium chloride 0.9 % flush 10 mL, 10 mL, Intravenous, Q12H, Megan Carmona, APRN    sodium chloride 0.9 % flush 10 mL, 10 mL, Intravenous, PRN, Megan Carmona, APRN    sodium chloride 0.9 % infusion 40 mL, 40 mL, Intravenous, PRN, Megan Carmona, APRN     ASSESSMENT  Paroxysmal atrial fibrillation with rapid ventricular rate  Coronary artery disease  Cardiomyopathy  Hypertension  Hyperlipidemia   Irritable bowel syndrome  Gastroesophageal reflux disease    PLAN  Agree with current care  IV amiodarone  Anticoagulation  Continue aspirin Plavix Toprol-XL Entresto and Pravachol  Cardiac catheterization in a.m.  Stress ulcer DVT prophylaxis  Supportive care  Patient is full code  Discussed with nursing staff  We will follow with Dr. BARTH and further recommendation current hospital course    MIREILLE MCDANIEL MD

## 2024-01-10 NOTE — ED PROVIDER NOTES
" EMERGENCY DEPARTMENT ENCOUNTER    Room Number:  105/1  PCP: Luis Davis MD  Historian: patient      HPI:  Chief Complaint: Irregular heart rate  A complete HPI/ROS/PMH/PSH/SH/FH are unobtainable due to: Nothing  Context: Lili Ruiz is a pleasant afebrile ambulatory 78 y.o.  female who presents to the ED c/o chest pain and irregular heart rate    Brought to the ED via EMS, received a bolus of \"18mg\" of cardizem and 500ml bolus of NS  Has a hx of paroxysmal atrial fibrillation, eliquis discontinued around October Follows with dr. Orta with cardiology  pt's  called EMS for chest pressure, she developed diaphoresis, had some trouble walking and felt her heart beating fast  A few years ago she had multiple syncopal episodes,this AM she felt as though she was going to pass out if she did not lay down but etiology of this was not discovered.    PAST MEDICAL HISTORY  Active Ambulatory Problems     Diagnosis Date Noted    Visual impairment     Peptic ulceration     Low back pain     Kidney stone     Hypertension     Hyperlipidemia     Diverticulosis     Depression     Cancer     Atrial fibrillation     Arthritis     Anxiety     Human metapneumovirus (hMPV) pneumonia     Paroxysmal supraventricular tachycardia 10/03/2017    Esophageal reflux 03/25/2013    Ductal carcinoma in situ (DCIS) of right breast 05/29/2015    Carotid artery stenosis 10/03/2017    Family history of malignant neoplasm of gastrointestinal tract 03/25/2013    Family history of malignant neoplasm of genital organ, other 03/25/2013    Mitral annular calcification 11/04/2019    Chronic anticoagulation 05/04/2020    Dysphagia 01/25/2021    Aortic insufficiency 12/07/2021    Tricuspid regurgitation 06/08/2022    Irritable bowel syndrome with diarrhea 10/12/2022    NSTEMI (non-ST elevated myocardial infarction) 10/13/2023    Coronary artery disease of bypass graft of native heart with stable angina pectoris 11/07/2023 "     Resolved Ambulatory Problems     Diagnosis Date Noted    Obesity     Functional diarrhea 01/25/2021     Past Medical History:   Diagnosis Date    Aortic valve insufficiency     Breast cancer     Cataract     Non Q wave myocardial infarction 10/2023         PAST SURGICAL HISTORY  Past Surgical History:   Procedure Laterality Date    APPENDECTOMY      BREAST BIOPSY      BREAST SURGERY      CARDIAC CATHETERIZATION N/A 10/14/2023    Procedure: Left Heart Cath;  Surgeon: Teo Orta MD;  Location: [facility] CATH INVASIVE LOCATION;  Service: Cardiovascular;  Laterality: N/A;    CARDIAC CATHETERIZATION N/A 10/14/2023    Procedure: Coronary angiography;  Surgeon: Teo Orta MD;  Location: [facility] CATH INVASIVE LOCATION;  Service: Cardiovascular;  Laterality: N/A;    CARDIAC CATHETERIZATION N/A 10/14/2023    Procedure: Left ventriculography;  Surgeon: Teo Orta MD;  Location: [facility] CATH INVASIVE LOCATION;  Service: Cardiovascular;  Laterality: N/A;    CHOLECYSTECTOMY      COLONOSCOPY  appox 2014    COLONOSCOPY W/ POLYPECTOMY N/A 03/10/2021    Procedure: COLONOSCOPY  TO CECUM WITH BIOPSY AND POLYPECTOMY (COLD SNARE);  Surgeon: Joel Galan MD;  Location: [facility] ENDOSCOPY;  Service: Gastroenterology;  Laterality: N/A;   DIARRHEA  POST: COLON POLYPS , HEMORRHOIDS  (COLD SNARE SUPPLY SAMPLE USED)    ENDOSCOPY N/A 03/10/2021    Procedure: ESOPHAGOGASTRODUODENOSCOPY WITH BIOPSY;  Surgeon: Joel Galan MD;  Location: [facility] ENDOSCOPY;  Service: Gastroenterology;  Laterality: N/A;  DYSPHAGIA; DIARRHEA  POST: SCHIATZKI'S RING; GASTRIC NODULE    GASTRIC BYPASS      MASTECTOMY Right 2015         FAMILY HISTORY  Family History   Problem Relation Age of Onset    Cancer Mother     Ovarian cancer Mother     Heart disease Father     Colon cancer Maternal Aunt          SOCIAL HISTORY  Social History     Socioeconomic History    Marital status:    Tobacco Use    Smoking status:  Former    Smokeless tobacco: Never    Tobacco comments:     50 yrs ago   Vaping Use    Vaping Use: Never used   Substance and Sexual Activity    Alcohol use: No    Drug use: Never    Sexual activity: Defer     Partners: Male     Birth control/protection: Post-menopausal         ALLERGIES  Penicillins, Hydrocodone-acetaminophen, and Sotalol hcl        REVIEW OF SYSTEMS  Review of Systems   Cardiovascular:  Positive for chest pain and palpitations.   Gastrointestinal:  Positive for nausea.            PHYSICAL EXAM  ED Triage Vitals [01/10/24 0539]   Temp Heart Rate Resp BP SpO2   97.7 °F (36.5 °C) 84 18 90/60 92 %      Temp src Heart Rate Source Patient Position BP Location FiO2 (%)   Oral -- -- -- --       Physical Exam      GENERAL: Anxious affect, alert and oriented x 4, no acute distress  HENT: nares patent, mucous membranes are moist and intact  EYES: no scleral icterus, no injection  CV: Irregularly irregular rhythm with tachycardia, no murmur appreciated, no peripheral edema  RESPIRATORY: normal effort, clear to auscultation all fields bilaterally, no adventitious breath sounds, able to speak in full sentences  ABDOMEN: soft and nontender diffusely, no rebound or guarding, bowel sounds WNL  MUSCULOSKELETAL: no deformity  NEURO: alert, moves all extremities, follows commands  PSYCH:  calm, cooperative  SKIN: warm, dry and intact    Vital signs and nursing notes reviewed.          LAB RESULTS  Recent Results (from the past 24 hour(s))   ECG 12 Lead Rhythm Change    Collection Time: 01/10/24  5:38 AM   Result Value Ref Range    QT Interval 407 ms    QTC Interval 504 ms   Protime-INR    Collection Time: 01/10/24  5:42 AM    Specimen: Blood   Result Value Ref Range    Protime 13.2 11.7 - 14.2 Seconds    INR 0.99 0.90 - 1.10   aPTT    Collection Time: 01/10/24  5:42 AM    Specimen: Blood   Result Value Ref Range    PTT 22.3 (L) 22.7 - 35.4 seconds   BNP    Collection Time: 01/10/24  5:42 AM    Specimen: Blood    Result Value Ref Range    proBNP 141.0 0.0 - 1,800.0 pg/mL   CBC Auto Differential    Collection Time: 01/10/24  5:42 AM    Specimen: Blood   Result Value Ref Range    WBC 5.42 3.40 - 10.80 10*3/mm3    RBC 4.21 3.77 - 5.28 10*6/mm3    Hemoglobin 13.2 12.0 - 15.9 g/dL    Hematocrit 41.2 34.0 - 46.6 %    MCV 97.9 (H) 79.0 - 97.0 fL    MCH 31.4 26.6 - 33.0 pg    MCHC 32.0 31.5 - 35.7 g/dL    RDW 13.0 12.3 - 15.4 %    RDW-SD 46.2 37.0 - 54.0 fl    MPV 9.8 6.0 - 12.0 fL    Platelets 183 140 - 450 10*3/mm3    Neutrophil % 44.6 42.7 - 76.0 %    Lymphocyte % 46.7 (H) 19.6 - 45.3 %    Monocyte % 7.4 5.0 - 12.0 %    Eosinophil % 0.7 0.3 - 6.2 %    Basophil % 0.4 0.0 - 1.5 %    Immature Grans % 0.2 0.0 - 0.5 %    Neutrophils, Absolute 2.42 1.70 - 7.00 10*3/mm3    Lymphocytes, Absolute 2.53 0.70 - 3.10 10*3/mm3    Monocytes, Absolute 0.40 0.10 - 0.90 10*3/mm3    Eosinophils, Absolute 0.04 0.00 - 0.40 10*3/mm3    Basophils, Absolute 0.02 0.00 - 0.20 10*3/mm3    Immature Grans, Absolute 0.01 0.00 - 0.05 10*3/mm3    nRBC 0.0 0.0 - 0.2 /100 WBC   Comprehensive Metabolic Panel    Collection Time: 01/10/24  6:12 AM    Specimen: Blood   Result Value Ref Range    Glucose 116 (H) 65 - 99 mg/dL    BUN 17 8 - 23 mg/dL    Creatinine 0.85 0.57 - 1.00 mg/dL    Sodium 144 136 - 145 mmol/L    Potassium 3.8 3.5 - 5.2 mmol/L    Chloride 111 (H) 98 - 107 mmol/L    CO2 23.7 22.0 - 29.0 mmol/L    Calcium 9.0 8.6 - 10.5 mg/dL    Total Protein 5.7 (L) 6.0 - 8.5 g/dL    Albumin 3.6 3.5 - 5.2 g/dL    ALT (SGPT) 12 1 - 33 U/L    AST (SGOT) 14 1 - 32 U/L    Alkaline Phosphatase 44 39 - 117 U/L    Total Bilirubin 0.5 0.0 - 1.2 mg/dL    Globulin 2.1 gm/dL    A/G Ratio 1.7 g/dL    BUN/Creatinine Ratio 20.0 7.0 - 25.0    Anion Gap 9.3 5.0 - 15.0 mmol/L    eGFR 71.1 >60.0 mL/min/1.73   High Sensitivity Troponin T    Collection Time: 01/10/24  6:12 AM    Specimen: Blood   Result Value Ref Range    HS Troponin T 22 (H) <14 ng/L   Magnesium    Collection Time:  01/10/24  6:12 AM    Specimen: Blood   Result Value Ref Range    Magnesium 2.0 1.6 - 2.4 mg/dL   TSH    Collection Time: 01/10/24  6:12 AM    Specimen: Blood   Result Value Ref Range    TSH 3.770 0.270 - 4.200 uIU/mL   High Sensitivity Troponin T 2Hr    Collection Time: 01/10/24  8:09 AM    Specimen: Blood   Result Value Ref Range    HS Troponin T 34 (H) <14 ng/L    Troponin T Delta 12 (C) >=-4 - <+4 ng/L   BNP    Collection Time: 01/10/24  3:45 PM    Specimen: Blood   Result Value Ref Range    proBNP 1,145.0 0.0 - 1,800.0 pg/mL       Ordered the above labs and reviewed the results.        RADIOLOGY  XR Chest 1 View    Result Date: 1/10/2024  SINGLE VIEW OF THE CHEST  HISTORY: Chest pain  COMPARISON: October 13, 2023  FINDINGS: There is cardiomegaly. There is no vascular congestion. No pneumothorax, pleural effusion, or acute infiltrate is seen. There is stable elevation of the right hemidiaphragm. There is evidence of prior granulomatous disease.      No acute findings.  This report was finalized on 1/10/2024 5:57 AM by Dr. Janelle Guerrero M.D on Workstation: [id number]       Ordered the above noted radiological studies. Reviewed by me in PACS.      HEART SCORE:    History  Highly suspicious              2    Moderately suspicious             1    Slightly or non-suspicious             0    ECG  Significant ST depression              2    Nonspecific repol disturbance            1    Normal                           0    Age  > or = 65                          2     46-65                           1    < or = 45                          0    Risk factors (hypercholesterolemia, HTN, DM, smoking, pos fam hx, obesity)                            > or = to 3 RF for atherosclerotic dx   2    1 or 2                 1    No risk factors                0    Troponin > or = 3x normal limit               2    1-3x normal limit    1    < or = Normal limit    0        HEART Score Key:  Scores 0-3: 0.9-1.7% risk of adverse  cardiac event. In the HEART Score study, these patients were discharged (0.99% in the retrospective study, 1.7% in the prospective study)  Scores 4-6: 12-16.6% risk of adverse cardiac event. In the HEART Score study, these patients were admitted to the hospital. (11.6% retrospective, 16.6% prospective)  Scores ?7: 50-65% risk of adverse cardiac event. In the HEART Score study, these patients were candidates for early invasive measures. (65.2% retrospective, 50.1% prospective)      This patient's HEART score is 7         PROCEDURES  Procedures        MEDICATIONS GIVEN IN ER  Medications   sodium chloride 0.9 % flush 10 mL (has no administration in time range)   amiodarone 150 mg in 100 mL D5W (loading dose) (0 mg Intravenous Stopped 1/10/24 1025)     Followed by   amiodarone 360 mg in 200 mL D5W infusion (1 mg/min Intravenous New Bag 1/10/24 1609)     Followed by   amiodarone 360 mg in 200 mL D5W infusion (has no administration in time range)   Enoxaparin Sodium (LOVENOX) syringe 70 mg (70 mg Subcutaneous Given 1/10/24 1440)   clopidogrel (PLAVIX) tablet 75 mg (75 mg Oral Given 1/10/24 1507)   magnesium oxide (MAG-OX) tablet 400 mg (400 mg Oral Given 1/10/24 1507)   metoprolol succinate XL (TOPROL-XL) 24 hr tablet 25 mg (25 mg Oral Given 1/10/24 1506)   pantoprazole (PROTONIX) EC tablet 40 mg (has no administration in time range)   pravastatin (PRAVACHOL) tablet 80 mg (80 mg Oral Given 1/10/24 1506)   sacubitril-valsartan (ENTRESTO) 24-26 MG tablet 1 tablet (has no administration in time range)   sodium chloride 0.9 % flush 10 mL (has no administration in time range)   sodium chloride 0.9 % flush 10 mL (has no administration in time range)   sodium chloride 0.9 % infusion 40 mL (has no administration in time range)   nitroglycerin (NITROSTAT) SL tablet 0.4 mg (has no administration in time range)   aspirin EC tablet 81 mg (has no administration in time range)   sodium chloride 0.9 % bolus 500 mL (0 mL Intravenous  Stopped 1/10/24 0624)   aspirin tablet 325 mg (325 mg Oral Given 1/10/24 0503)   sodium chloride 0.9 % bolus 1,000 mL (0 mL Intravenous Stopped 1/10/24 0920)         MEDICAL DECISION MAKING, PROGRESS, and CONSULTS    All labs have been independently reviewed by me.  All radiology studies have been reviewed by me and I have also reviewed the radiology report.   EKG's independently viewed and interpreted by me.  Discussion below represents my analysis of pertinent findings related to patient's condition, differential diagnosis, treatment plan and final disposition.      Additional sources:  - Discussed/ obtained information from independent historians: History obtained from patient and her spouse    - External (non-ED) record review: Office visit 12/28/2023 patient seen by Dr. Orta for coronary artery disease, paroxysmal atrial fibrillation, hypertension and hypercholesterolemia    - Chronic or social conditions impacting care: Patient endorses good social support    - Shared decision making: Discussed test results and treatment options with patient, agreeable to admission to the hospital for further management of her A-fib and blood pressure      Orders placed during this visit:  Orders Placed This Encounter   Procedures    Critical Care    XR Chest 1 View    Comprehensive Metabolic Panel    Protime-INR    aPTT    BNP    High Sensitivity Troponin T    Magnesium    CBC Auto Differential    TSH    High Sensitivity Troponin T 2Hr    CBC (No Diff)    Lipid Panel    High Sensitivity Troponin T    Comprehensive Metabolic Panel    TSH    Hemoglobin A1c    BNP    Diet: Cardiac Diets; Healthy Heart (2-3 Na+); Texture: Regular Texture (IDDSI 7); Fluid Consistency: Thin (IDDSI 0)    Monitor Blood Pressure    Pulse Oximetry, Continuous    Vital Signs    Intake & Output    Weigh Patient    Oral Care    Telemetry - Maintain IV Access    Telemetry - Place Orders & Notify Provider of Results When Patient Experiences Acute  "Chest Pain, Dysrhythmia or Respiratory Distress    May Be Off Telemetry for Tests    Place Sequential Compression Device    Maintain Sequential Compression Device    Code Status and Medical Interventions:    Cardiology (on-call MD unless specified)    Inpatient Internal Medicine Consult    ECG 12 Lead Rhythm Change    SCANNED - TELEMETRY      ECG 12 Lead Drug Monitoring    Insert Peripheral IV    Insert Peripheral IV    Initiate Observation Status    CBC & Differential         Additional orders considered but not ordered:  I consider checking a free T3 and free T4 be given the patient's TSH is normal I do not think would add much to the clinical picture      Differential diagnosis includes but is not limited to:    Atrial fibrillation, ventricular tachycardia, sepsis      Independent interpretation of labs, radiology studies, and discussions with consultants:  ED Course as of 01/10/24 1631   Wed Barrett 10, 2024   0540 First look: Patient with history of paroxysmal A-fib, LBBB, CAD presents having awoken from sleep with chest pain.  Per EMS on arrival she was diaphoretic and was in A-fib RVR rate of 150.  They also relate that her \"blood pressure was soft \".  Patient was given a dose of diltiazem and 500 cc of fluid prior to arrival in ED.  That rate was controlled on arrival.  Patient denies shortness of breath no chest pain at present.  States that she was previously on medication to control her A-fib which was discontinued in October. [TJ]   0706 HS Troponin T(!): 22 [AH]   0806 Phone call with dr. santoro.  Discussed the patient, relevant history, exam, diagnostics, ED findings/progress, and concerns. They agree to admit to a tele obs floor, recommends dilt drip if her HR is >100 again   [AH]   0810 Systolic blood pressure 88, heart rate 122, diltiazem drip ordered [AH]   1628 XR Chest 1 View  Per my independent interpretation is no pneumothorax, no focal infiltrate [AH]   1629 ECG 12 Lead Rhythm Change  Per my " independent interpretation is atrial fibrillation with rate of 92, QTc 504, no evidence for acute ischemia appreciated []   1630 MDM: Patient here with chest pain and irregular heart rate suggestive of A-fib RVR, blood pressure is a little on the soft side so dill drip ordered with IV fluid bolus with hopeful patient conversion and resumption of normotension.  Unfortunately patient had some elevation of her troponins and she endorsed chest discomfort.  Will defer to cardiology expertise to decide if this is from demand or if this is an acute ischemic event.  Patient's EKG does not suggest ACS though. []      ED Course User Index  [] Sofie Vega APRN  [TJ] Dave Sanchez MD             I have worn appropriate PPE during this patient encounter, sanitized my hands both with entering and exiting patient's room.      DIAGNOSIS  Final diagnoses:   Atrial fibrillation, unspecified type   Chest pain, unspecified type   History of coronary artery disease   History of hypertension         DISPOSITION  Admitted to cardiology, Dr. Orta            Latest Documented Vital Signs:  As of 16:31 EST  BP- 103/76 HR- 99 Temp- 97.1 °F (36.2 °C) O2 sat- 92%              --    Please note that portions of this were completed with a voice recognition program.       Note Disclaimer: At [facility], we believe that sharing information builds trust and better relationships. You are receiving this note because you are receiving care at [facility] or recently visited. It is possible you will see health information before a provider has talked with you about it. This kind of information can be easy to misunderstand. To help you fully understand what it means for your health, we urge you to discuss this note with your provider.             Sofie Vega APRN  01/10/24 1631

## 2024-01-10 NOTE — Clinical Note
The physician has confirmed that the patient has been reassessed and is appropriate for moderate sedation. Physician confirmed order for sedation and is aware patient ate at 1330pm.

## 2024-01-11 PROBLEM — R07.2 PRECORDIAL PAIN: Status: ACTIVE | Noted: 2024-01-10

## 2024-01-11 LAB
ALBUMIN SERPL-MCNC: 3 G/DL (ref 3.5–5.2)
ALBUMIN/GLOB SERPL: 1.5 G/DL
ALP SERPL-CCNC: 41 U/L (ref 39–117)
ALT SERPL W P-5'-P-CCNC: 11 U/L (ref 1–33)
ANION GAP SERPL CALCULATED.3IONS-SCNC: 8.6 MMOL/L (ref 5–15)
AST SERPL-CCNC: 11 U/L (ref 1–32)
BILIRUB SERPL-MCNC: 0.2 MG/DL (ref 0–1.2)
BUN SERPL-MCNC: 15 MG/DL (ref 8–23)
BUN/CREAT SERPL: 20 (ref 7–25)
CALCIUM SPEC-SCNC: 8.8 MG/DL (ref 8.6–10.5)
CHLORIDE SERPL-SCNC: 110 MMOL/L (ref 98–107)
CHOLEST SERPL-MCNC: 149 MG/DL (ref 0–200)
CO2 SERPL-SCNC: 21.4 MMOL/L (ref 22–29)
CREAT SERPL-MCNC: 0.75 MG/DL (ref 0.57–1)
DEPRECATED RDW RBC AUTO: 41.8 FL (ref 37–54)
EGFRCR SERPLBLD CKD-EPI 2021: 82.6 ML/MIN/1.73
ERYTHROCYTE [DISTWIDTH] IN BLOOD BY AUTOMATED COUNT: 12.2 % (ref 12.3–15.4)
GLOBULIN UR ELPH-MCNC: 2 GM/DL
GLUCOSE SERPL-MCNC: 91 MG/DL (ref 65–99)
HBA1C MFR BLD: 5 % (ref 4.8–5.6)
HCT VFR BLD AUTO: 39.4 % (ref 34–46.6)
HDLC SERPL-MCNC: 56 MG/DL (ref 40–60)
HGB BLD-MCNC: 12.3 G/DL (ref 12–15.9)
LDLC SERPL CALC-MCNC: 81 MG/DL (ref 0–100)
LDLC/HDLC SERPL: 1.45 {RATIO}
MCH RBC QN AUTO: 29.4 PG (ref 26.6–33)
MCHC RBC AUTO-ENTMCNC: 31.2 G/DL (ref 31.5–35.7)
MCV RBC AUTO: 94.3 FL (ref 79–97)
PLATELET # BLD AUTO: 181 10*3/MM3 (ref 140–450)
PMV BLD AUTO: 10.2 FL (ref 6–12)
POTASSIUM SERPL-SCNC: 3.9 MMOL/L (ref 3.5–5.2)
PROT SERPL-MCNC: 5 G/DL (ref 6–8.5)
QT INTERVAL: 491 MS
QTC INTERVAL: 478 MS
RBC # BLD AUTO: 4.18 10*6/MM3 (ref 3.77–5.28)
SODIUM SERPL-SCNC: 140 MMOL/L (ref 136–145)
TRIGL SERPL-MCNC: 59 MG/DL (ref 0–150)
TROPONIN T SERPL HS-MCNC: 30 NG/L
TSH SERPL DL<=0.05 MIU/L-ACNC: 1.93 UIU/ML (ref 0.27–4.2)
VLDLC SERPL-MCNC: 12 MG/DL (ref 5–40)
WBC NRBC COR # BLD AUTO: 4.96 10*3/MM3 (ref 3.4–10.8)

## 2024-01-11 PROCEDURE — C1769 GUIDE WIRE: HCPCS | Performed by: INTERNAL MEDICINE

## 2024-01-11 PROCEDURE — C1753 CATH, INTRAVAS ULTRASOUND: HCPCS | Performed by: INTERNAL MEDICINE

## 2024-01-11 PROCEDURE — 25010000002 MIDAZOLAM PER 1 MG: Performed by: INTERNAL MEDICINE

## 2024-01-11 PROCEDURE — 93010 ELECTROCARDIOGRAM REPORT: CPT | Performed by: INTERNAL MEDICINE

## 2024-01-11 PROCEDURE — 25010000002 ONDANSETRON PER 1 MG

## 2024-01-11 PROCEDURE — G0378 HOSPITAL OBSERVATION PER HR: HCPCS

## 2024-01-11 PROCEDURE — 85027 COMPLETE CBC AUTOMATED: CPT

## 2024-01-11 PROCEDURE — 25010000002 FENTANYL CITRATE (PF) 50 MCG/ML SOLUTION: Performed by: INTERNAL MEDICINE

## 2024-01-11 PROCEDURE — 25010000002 AMIODARONE IN DEXTROSE 5% 360-4.14 MG/200ML-% SOLUTION

## 2024-01-11 PROCEDURE — 4A023N7 MEASUREMENT OF CARDIAC SAMPLING AND PRESSURE, LEFT HEART, PERCUTANEOUS APPROACH: ICD-10-PCS | Performed by: INTERNAL MEDICINE

## 2024-01-11 PROCEDURE — 84484 ASSAY OF TROPONIN QUANT: CPT

## 2024-01-11 PROCEDURE — 85347 COAGULATION TIME ACTIVATED: CPT

## 2024-01-11 PROCEDURE — 93458 L HRT ARTERY/VENTRICLE ANGIO: CPT | Performed by: INTERNAL MEDICINE

## 2024-01-11 PROCEDURE — 25010000002 AMIODARONE IN DEXTROSE 5% 360-4.14 MG/200ML-% SOLUTION: Performed by: INTERNAL MEDICINE

## 2024-01-11 PROCEDURE — 83036 HEMOGLOBIN GLYCOSYLATED A1C: CPT | Performed by: HOSPITALIST

## 2024-01-11 PROCEDURE — 80053 COMPREHEN METABOLIC PANEL: CPT | Performed by: HOSPITALIST

## 2024-01-11 PROCEDURE — C1887 CATHETER, GUIDING: HCPCS | Performed by: INTERNAL MEDICINE

## 2024-01-11 PROCEDURE — 92928 PRQ TCAT PLMT NTRAC ST 1 LES: CPT | Performed by: INTERNAL MEDICINE

## 2024-01-11 PROCEDURE — 92978 ENDOLUMINL IVUS OCT C 1ST: CPT | Performed by: INTERNAL MEDICINE

## 2024-01-11 PROCEDURE — 25010000002 HEPARIN (PORCINE) PER 1000 UNITS: Performed by: INTERNAL MEDICINE

## 2024-01-11 PROCEDURE — B240ZZ3 ULTRASONOGRAPHY OF SINGLE CORONARY ARTERY, INTRAVASCULAR: ICD-10-PCS | Performed by: INTERNAL MEDICINE

## 2024-01-11 PROCEDURE — C1894 INTRO/SHEATH, NON-LASER: HCPCS | Performed by: INTERNAL MEDICINE

## 2024-01-11 PROCEDURE — 80061 LIPID PANEL: CPT

## 2024-01-11 PROCEDURE — B2111ZZ FLUOROSCOPY OF MULTIPLE CORONARY ARTERIES USING LOW OSMOLAR CONTRAST: ICD-10-PCS | Performed by: INTERNAL MEDICINE

## 2024-01-11 PROCEDURE — C1874 STENT, COATED/COV W/DEL SYS: HCPCS | Performed by: INTERNAL MEDICINE

## 2024-01-11 PROCEDURE — 25010000002 ENOXAPARIN PER 10 MG

## 2024-01-11 PROCEDURE — C9600 PERC DRUG-EL COR STENT SING: HCPCS | Performed by: INTERNAL MEDICINE

## 2024-01-11 PROCEDURE — 84443 ASSAY THYROID STIM HORMONE: CPT | Performed by: HOSPITALIST

## 2024-01-11 PROCEDURE — 027034Z DILATION OF CORONARY ARTERY, ONE ARTERY WITH DRUG-ELUTING INTRALUMINAL DEVICE, PERCUTANEOUS APPROACH: ICD-10-PCS | Performed by: INTERNAL MEDICINE

## 2024-01-11 PROCEDURE — 25510000001 IOPAMIDOL PER 1 ML: Performed by: INTERNAL MEDICINE

## 2024-01-11 PROCEDURE — 93005 ELECTROCARDIOGRAM TRACING: CPT

## 2024-01-11 DEVICE — XIENCE SKYPOINT™ EVEROLIMUS ELUTING CORONARY STENT SYSTEM 3.50 MM X 12 MM / RAPID-EXCHANGE
Type: IMPLANTABLE DEVICE | Site: CORONARY | Status: FUNCTIONAL
Brand: XIENCE SKYPOINT™

## 2024-01-11 RX ORDER — MIDAZOLAM HYDROCHLORIDE 1 MG/ML
INJECTION INTRAMUSCULAR; INTRAVENOUS
Status: DISCONTINUED | OUTPATIENT
Start: 2024-01-11 | End: 2024-01-11 | Stop reason: HOSPADM

## 2024-01-11 RX ORDER — ASPIRIN 325 MG
TABLET ORAL
Status: DISCONTINUED | OUTPATIENT
Start: 2024-01-11 | End: 2024-01-11 | Stop reason: HOSPADM

## 2024-01-11 RX ORDER — CLOPIDOGREL BISULFATE 75 MG/1
TABLET ORAL
Status: DISCONTINUED | OUTPATIENT
Start: 2024-01-11 | End: 2024-01-11 | Stop reason: HOSPADM

## 2024-01-11 RX ORDER — NITROGLYCERIN 0.4 MG/1
0.4 TABLET SUBLINGUAL
Status: DISCONTINUED | OUTPATIENT
Start: 2024-01-11 | End: 2024-01-12 | Stop reason: HOSPADM

## 2024-01-11 RX ORDER — ACETAMINOPHEN 325 MG/1
650 TABLET ORAL EVERY 4 HOURS PRN
Status: DISCONTINUED | OUTPATIENT
Start: 2024-01-11 | End: 2024-01-12 | Stop reason: HOSPADM

## 2024-01-11 RX ORDER — VERAPAMIL HYDROCHLORIDE 2.5 MG/ML
INJECTION, SOLUTION INTRAVENOUS
Status: DISCONTINUED | OUTPATIENT
Start: 2024-01-11 | End: 2024-01-11 | Stop reason: HOSPADM

## 2024-01-11 RX ORDER — ONDANSETRON 2 MG/ML
INJECTION INTRAMUSCULAR; INTRAVENOUS
Status: DISCONTINUED
Start: 2024-01-11 | End: 2024-01-11

## 2024-01-11 RX ORDER — FENTANYL CITRATE 50 UG/ML
INJECTION, SOLUTION INTRAMUSCULAR; INTRAVENOUS
Status: DISCONTINUED | OUTPATIENT
Start: 2024-01-11 | End: 2024-01-11 | Stop reason: HOSPADM

## 2024-01-11 RX ORDER — SODIUM CHLORIDE 9 MG/ML
INJECTION, SOLUTION INTRAVENOUS
Status: COMPLETED | OUTPATIENT
Start: 2024-01-11 | End: 2024-01-11

## 2024-01-11 RX ORDER — HEPARIN SODIUM 1000 [USP'U]/ML
INJECTION, SOLUTION INTRAVENOUS; SUBCUTANEOUS
Status: DISCONTINUED | OUTPATIENT
Start: 2024-01-11 | End: 2024-01-11 | Stop reason: HOSPADM

## 2024-01-11 RX ORDER — LIDOCAINE HYDROCHLORIDE 20 MG/ML
INJECTION, SOLUTION INFILTRATION; PERINEURAL
Status: DISCONTINUED | OUTPATIENT
Start: 2024-01-11 | End: 2024-01-11 | Stop reason: HOSPADM

## 2024-01-11 RX ADMIN — Medication 10 ML: at 08:52

## 2024-01-11 RX ADMIN — AMIODARONE HYDROCHLORIDE 0.5 MG/MIN: 1.8 INJECTION, SOLUTION INTRAVENOUS at 02:06

## 2024-01-11 RX ADMIN — MAGNESIUM OXIDE 400 MG (241.3 MG MAGNESIUM) TABLET 400 MG: TABLET at 08:52

## 2024-01-11 RX ADMIN — ASPIRIN 81 MG: 81 TABLET, COATED ORAL at 08:52

## 2024-01-11 RX ADMIN — ONDANSETRON HYDROCHLORIDE 4 MG: 2 INJECTION, SOLUTION INTRAMUSCULAR; INTRAVENOUS at 17:11

## 2024-01-11 RX ADMIN — AMIODARONE HYDROCHLORIDE 0.5 MG/MIN: 1.8 INJECTION, SOLUTION INTRAVENOUS at 13:41

## 2024-01-11 RX ADMIN — PANTOPRAZOLE SODIUM 40 MG: 40 TABLET, DELAYED RELEASE ORAL at 05:08

## 2024-01-11 RX ADMIN — PRAVASTATIN SODIUM 80 MG: 40 TABLET ORAL at 08:52

## 2024-01-11 RX ADMIN — SACUBITRIL AND VALSARTAN 1 TABLET: 24; 26 TABLET, FILM COATED ORAL at 08:52

## 2024-01-11 RX ADMIN — CLOPIDOGREL BISULFATE 75 MG: 75 TABLET, FILM COATED ORAL at 08:52

## 2024-01-11 RX ADMIN — METOPROLOL SUCCINATE 25 MG: 25 TABLET, EXTENDED RELEASE ORAL at 08:52

## 2024-01-11 RX ADMIN — AMIODARONE HYDROCHLORIDE 0.5 MG/MIN: 1.8 INJECTION, SOLUTION INTRAVENOUS at 17:49

## 2024-01-11 RX ADMIN — ENOXAPARIN SODIUM 70 MG: 100 INJECTION SUBCUTANEOUS at 02:08

## 2024-01-11 NOTE — PROGRESS NOTES
"Daily progress note    Referring physician  Dr. BARTH     Subjective  Awake and alert and feeling same with occasional chest discomfort and palpitation but no shortness of breath.    History of present illness  78-year-old white female with history of paroxysmal atrial fibrillation coronary artery disease hypertension hyperlipidemia admitted through emergency room to the observation unit with palpitation dizziness and thought she is current passed out.  Patient also have some shortness of breath but no chest pain.  Patient workup revealed rapid ventricular rate with history of atrial fibrillation and received diltiazem IV fluid and now admitted to cardiology service and I am asked to follow patient for medical problem.  At the time of examination she looks anxious but no complaints.  Patient denies any fever chills abdominal pain nausea vomiting diarrhea.    Review of Systems:  Constitutional: No weakness,fatigue, fever, chills   Eyes: No eye pain, vision changes   ENT/oropharynx: No difficulty swallowing, no epistaxis, no changes in hearing   Cardiovascular: No chest pain, chest tightness, palpitations, paroxysmal nocturnal dyspnea, orthopnea, diaphoresis, dizziness / syncopal episode   Respiratory: No shortness of breath, dyspnea on exertion, cough, wheezing hemoptysis   Gastrointestinal: No abdominal pain, nausea, vomiting, diarrhea, bloody stools   Genitourinary: No hematuria, dysuria   Neurological: No headache, numbness, tingling, one-sided weakness    Musculoskeletal: No cramps, joint pain   Integument: No rash, edema      Physical Exam:   Blood pressure 133/52, pulse 73, temperature 97.7 °F (36.5 °C), temperature source Oral, resp. rate 16, height 160 cm (63\"), weight 73.1 kg (161 lb 2.5 oz), SpO2 95%.    GENERAL: Pleasant cooperative and conversant female, alert, no acute distress  SKIN: Warm, dry  HENT: Normocephalic, atraumatic  EYES: no scleral icterus, EOMI, no conjunctivitis  CV: irregular rhythm, regular " rate  RESPIRATORY: normal effort, diminished at the bases with no wheezing and no rhonchi  ABDOMEN: soft, nontender, nondistended bowel sounds positive  MUSCULOSKELETAL: no deformity  NEURO: alert, moves all extremities, follows commands    LABS  Lab Results (last 24 hours)       Procedure Component Value Units Date/Time    Hemoglobin A1c [[id number]]  (Normal) Collected: 01/11/24 0634    Specimen: Blood Updated: 01/11/24 0852     Hemoglobin A1C 5.00 %     Narrative:      Hemoglobin A1C Ranges:    Increased Risk for Diabetes  5.7% to 6.4%  Diabetes                     >= 6.5%  Diabetic Goal                < 7.0%    High Sensitivity Troponin T [[id number]]  (Abnormal) Collected: 01/11/24 0634    Specimen: Blood Updated: 01/11/24 0759     HS Troponin T 30 ng/L     Narrative:      High Sensitive Troponin T Reference Range:  <14.0 ng/L- Negative Female for AMI  <22.0 ng/L- Negative Male for AMI  >=14 - Abnormal Female indicating possible myocardial injury.  >=22 - Abnormal Male indicating possible myocardial injury.   Clinicians would have to utilize clinical acumen, EKG, Troponin, and serial changes to determine if it is an Acute Myocardial Infarction or myocardial injury due to an underlying chronic condition.         TSH [[id number]]  (Normal) Collected: 01/11/24 0634    Specimen: Blood Updated: 01/11/24 0759     TSH 1.930 uIU/mL     CBC (No Diff) [[id number]]  (Abnormal) Collected: 01/11/24 0634    Specimen: Blood Updated: 01/11/24 0754     WBC 4.96 10*3/mm3      RBC 4.18 10*6/mm3      Hemoglobin 12.3 g/dL      Hematocrit 39.4 %      MCV 94.3 fL      MCH 29.4 pg      MCHC 31.2 g/dL      RDW 12.2 %      RDW-SD 41.8 fl      MPV 10.2 fL      Platelets 181 10*3/mm3     Lipid Panel [[id number]] Collected: 01/11/24 0634    Specimen: Blood Updated: 01/11/24 0753     Total Cholesterol 149 mg/dL      Triglycerides 59 mg/dL      HDL Cholesterol 56 mg/dL      LDL Cholesterol  81 mg/dL      VLDL Cholesterol 12 mg/dL      LDL/HDL  Ratio 1.45    Narrative:      Cholesterol Reference Ranges  (U.S. Department of Health and Human Services ATP III Classifications)    Desirable          <200 mg/dL  Borderline High    200-239 mg/dL  High Risk          >240 mg/dL      Triglyceride Reference Ranges  (U.S. Department of Health and Human Services ATP III Classifications)    Normal           <150 mg/dL  Borderline High  150-199 mg/dL  High             200-499 mg/dL  Very High        >500 mg/dL    HDL Reference Ranges  (U.S. Department of Health and Human Services ATP III Classifications)    Low     <40 mg/dl (major risk factor for CHD)  High    >60 mg/dl ('negative' risk factor for CHD)        LDL Reference Ranges  (U.S. Department of Health and Human Services ATP III Classifications)    Optimal          <100 mg/dL  Near Optimal     100-129 mg/dL  Borderline High  130-159 mg/dL  High             160-189 mg/dL  Very High        >189 mg/dL    Comprehensive Metabolic Panel [[id number]]  (Abnormal) Collected: 01/11/24 0634    Specimen: Blood Updated: 01/11/24 0753     Glucose 91 mg/dL      BUN 15 mg/dL      Creatinine 0.75 mg/dL      Sodium 140 mmol/L      Potassium 3.9 mmol/L      Chloride 110 mmol/L      CO2 21.4 mmol/L      Calcium 8.8 mg/dL      Total Protein 5.0 g/dL      Albumin 3.0 g/dL      ALT (SGPT) 11 U/L      AST (SGOT) 11 U/L      Alkaline Phosphatase 41 U/L      Total Bilirubin 0.2 mg/dL      Globulin 2.0 gm/dL      A/G Ratio 1.5 g/dL      BUN/Creatinine Ratio 20.0     Anion Gap 8.6 mmol/L      eGFR 82.6 mL/min/1.73     Narrative:      GFR Normal >60  Chronic Kidney Disease <60  Kidney Failure <15    The GFR formula is only valid for adults with stable renal function between ages 18 and 70.    BNP [[id number]]  (Normal) Collected: 01/10/24 1545    Specimen: Blood Updated: 01/10/24 1612     proBNP 1,145.0 pg/mL     Narrative:      This assay is used as an aid in the diagnosis of individuals suspected of having heart failure. It can be used as an  aid in the diagnosis of acute decompensated heart failure (ADHF) in patients presenting with signs and symptoms of ADHF to the emergency department (ED). In addition, NT-proBNP of <300 pg/mL indicates ADHF is not likely.    Age Range Result Interpretation  NT-proBNP Concentration (pg/mL:      <50             Positive            >450                   Gray                 300-450                    Negative             <300    50-75           Positive            >900                  Gray                300-900                  Negative            <300      >75             Positive            >1800                  Gray                300-1800                  Negative            <300          Imaging Results (Last 24 Hours)       ** No results found for the last 24 hours. **          Results  Scan on 1/10/2024 0706 by [employer], [place]: ECG 12-LEAD         Author: -- Service: -- Author Type: --   Filed: Date of Service: Creation Time:   Status: (Other)   HEART RATE= 92  bpm  RR Interval= 652  ms  LA Interval=   ms  P Horizontal Axis=   deg  P Front Axis=   deg  QRSD Interval= 137  ms  QT Interval= 407  ms  QTcB= 504  ms  QRS Axis= -36  deg  T Wave Axis= 93  deg  - ABNORMAL ECG -  Atrial fibrillation - new  Left bundle branch block          Current Facility-Administered Medications:     [COMPLETED] amiodarone 150 mg in 100 mL D5W (loading dose), 150 mg, Intravenous, Once, Stopped at 01/10/24 1025 **FOLLOWED BY** [] amiodarone 360 mg in 200 mL D5W infusion, 1 mg/min, Intravenous, Continuous, Stopped at 01/10/24 1645 **FOLLOWED BY** amiodarone 360 mg in 200 mL D5W infusion, 0.5 mg/min, Intravenous, Continuous, Megan Carmona APRN, Last Rate: 16.67 mL/hr at 24 1341, 0.5 mg/min at 24 1341    aspirin EC tablet 81 mg, 81 mg, Oral, Daily, Megan Carmona APRN, 81 mg at 24 0852    clopidogrel (PLAVIX) tablet 75 mg, 75 mg, Oral, Daily, Megan Carmona APRN, 75 mg at 24 0852    magnesium  oxide (MAG-OX) tablet 400 mg, 400 mg, Oral, Daily, Peevey, Megan L, APRN, 400 mg at 01/11/24 0852    metoprolol succinate XL (TOPROL-XL) 24 hr tablet 25 mg, 25 mg, Oral, Daily, Peevey, Megan L, APRN, 25 mg at 01/11/24 0852    nitroglycerin (NITROSTAT) SL tablet 0.4 mg, 0.4 mg, Sublingual, Q5 Min PRN, Peevey, Megan L, APRN    pantoprazole (PROTONIX) EC tablet 40 mg, 40 mg, Oral, Q AM, Peevey, Megan L, APRN, 40 mg at 01/11/24 0508    pravastatin (PRAVACHOL) tablet 80 mg, 80 mg, Oral, Daily, Peevey, Megan L, APRN, 80 mg at 01/11/24 0852    sacubitril-valsartan (ENTRESTO) 24-26 MG tablet 1 tablet, 1 tablet, Oral, BID, Mathew, Megan L, APRN, 1 tablet at 01/11/24 0852    [COMPLETED] Insert Peripheral IV, , , Once **AND** sodium chloride 0.9 % flush 10 mL, 10 mL, Intravenous, PRN, Dave Sanchez MD    sodium chloride 0.9 % flush 10 mL, 10 mL, Intravenous, Q12H, Peevey, Megan L, APRN, 10 mL at 01/11/24 0852    sodium chloride 0.9 % flush 10 mL, 10 mL, Intravenous, PRN, Peevey, Megan L, APRN    sodium chloride 0.9 % infusion 40 mL, 40 mL, Intravenous, PRN, Peevey, Megan L, APRN     ASSESSMENT  Paroxysmal atrial fibrillation with rapid ventricular rate  Coronary artery disease  Cardiomyopathy  Hypertension  Hyperlipidemia   Irritable bowel syndrome  Gastroesophageal reflux disease    PLAN  CPM  Continue IV amiodarone  Continue anticoagulation  Continue aspirin Plavix Toprol-XL Entresto and Pravachol  Cardiac catheterization in a.m.  Stress ulcer DVT prophylaxis  Supportive care  Discussed with nursing staff and cardiology  We will follow with Dr. BARTH and further recommendation current hospital course    MIREILLE MCDANIEL MD    Copied text in this note has been reviewed and is accurate as of 01/11/24

## 2024-01-11 NOTE — PROGRESS NOTES
[facility]  Cardiology Progress Note    Patient Identification:  Name: Lili Ruiz  Age: 78 y.o.  Sex: female  :  1945  MRN: [id number]                 Follow Up / Chief Complaint: follow up for afib with rvr    Interval History: resting in bed, no chest pain or shortness of breath. Converted to SR, remains on IV amiodarone       Subjective: no chest pain      Objective:    Past Medical History:  Past Medical History:   Diagnosis Date    Anxiety     Aortic valve insufficiency     Arthritis     Atrial fibrillation     Breast cancer     Cancer     Breast    Cataract     bilateral eyes    Depression     Diverticulosis     Human metapneumovirus (hMPV) pneumonia     Hyperlipidemia     Hypertension     Kidney stone     Low back pain     Non Q wave myocardial infarction 10/2023    Obesity     Peptic ulceration     Visual impairment      Past Surgical History:  Past Surgical History:   Procedure Laterality Date    APPENDECTOMY      BREAST BIOPSY      BREAST SURGERY      CARDIAC CATHETERIZATION N/A 10/14/2023    Procedure: Left Heart Cath;  Surgeon: Teo Orta MD;  Location: [facility] CATH INVASIVE LOCATION;  Service: Cardiovascular;  Laterality: N/A;    CARDIAC CATHETERIZATION N/A 10/14/2023    Procedure: Coronary angiography;  Surgeon: Teo Orta MD;  Location: [facility] CATH INVASIVE LOCATION;  Service: Cardiovascular;  Laterality: N/A;    CARDIAC CATHETERIZATION N/A 10/14/2023    Procedure: Left ventriculography;  Surgeon: Teo Orta MD;  [facility];  Service: Cardiovascular;  Laterality: N/A;    CHOLECYSTECTOMY      COLONOSCOPY  appox     COLONOSCOPY W/ POLYPECTOMY N/A 03/10/2021    Procedure: COLONOSCOPY  TO CECUM WITH BIOPSY AND POLYPECTOMY (COLD SNARE);  Surgeon: Joel Galan MD;  Location: [facility] ENDOSCOPY;  Service: Gastroenterology;  Laterality: N/A;   DIARRHEA  POST: COLON POLYPS , HEMORRHOIDS  (COLD SNARE SUPPLY  SAMPLE USED)    ENDOSCOPY N/A 03/10/2021    Procedure: ESOPHAGOGASTRODUODENOSCOPY WITH BIOPSY;  Surgeon: Joel Galan MD;  Location: [facility] ENDOSCOPY;  Service: Gastroenterology;  Laterality: N/A;  DYSPHAGIA; DIARRHEA  POST: SCHIATZKI'S RING; GASTRIC NODULE    GASTRIC BYPASS      MASTECTOMY Right 2015        Social History:   Social History     Tobacco Use    Smoking status: Former    Smokeless tobacco: Never    Tobacco comments:     50 yrs ago   Substance Use Topics    Alcohol use: No      Family History:  Family History   Problem Relation Age of Onset    Cancer Mother     Ovarian cancer Mother     Heart disease Father     Colon cancer Maternal Aunt           Allergies:  Allergies   Allergen Reactions    Penicillins Shortness Of Breath and Itching    Hydrocodone-Acetaminophen     Sotalol Hcl      Scheduled Meds:  aspirin, 81 mg, Daily  clopidogrel, 75 mg, Daily  enoxaparin, 1 mg/kg, Q12H  magnesium oxide, 400 mg, Daily  metoprolol succinate XL, 25 mg, Daily  pantoprazole, 40 mg, Q AM  pravastatin, 80 mg, Daily  sacubitril-valsartan, 1 tablet, BID  sodium chloride, 10 mL, Q12H            INTAKE AND OUTPUT:    Intake/Output Summary (Last 24 hours) at 1/11/2024 1120  Last data filed at 1/11/2024 0506  Gross per 24 hour   Intake 1465.67 ml   Output --   Net 1465.67 ml       Review of Systems:   GI: no n/v or abd pain  Cardiac: no chest pain or palpitations  Pulmonary: no shortness of breath or cough      Constitutional:  Temp:  [97.1 °F (36.2 °C)-98.1 °F (36.7 °C)] 98.1 °F (36.7 °C)  Heart Rate:  [] 60  Resp:  [12-18] 16  BP: ()/(51-89) 107/51    Physical Exam:  General:  Appears in no acute distress  Eyes: eom normal no conjunctival drainage  HEENT:  No JVD. Thyroid not visibly enlarged. No mucosal pallor or cyanosis  Respiratory: Respirations regular and unlabored at rest. BBS with good air entry in all fields. No crackles, rubs or wheezes auscultated  Cardiovascular: S1S2 Regular rate and  "rhythm. No murmur, rub or gallop. No carotid bruits. DP/PT pulses   2+  . No pretibial pitting edema  Gastrointestinal: Abdomen soft, non tender. Bowel sounds present.   Musculoskeletal: PALAFOX x4. No abnormal movements  Neuro: AAO x3 CN II-XII grossly intact  Psych: Mood and affect normal, pleasant and cooperative            Cardiographics  ECG:     Echocardiogram:   Interpretation Summary         Left ventricular ejection fraction appears to be 56 - 60%.    Left ventricular diastolic function was normal.    Estimated right ventricular systolic pressure from tricuspid regurgitation is normal (<35 mmHg).     Lab Review   Results from last 7 days   Lab Units 01/11/24  0634 01/10/24  0809 01/10/24  0612   HSTROP T ng/L 30* 34* 22*     Results from last 7 days   Lab Units 01/10/24  0612   MAGNESIUM mg/dL 2.0     Results from last 7 days   Lab Units 01/11/24  0634   SODIUM mmol/L 140   POTASSIUM mmol/L 3.9   BUN mg/dL 15   CREATININE mg/dL 0.75   CALCIUM mg/dL 8.8     @[id number]@  Results from last 7 days   Lab Units 01/11/24  0634 01/10/24  0542   WBC 10*3/mm3 4.96 5.42   HEMOGLOBIN g/dL 12.3 13.2   HEMATOCRIT % 39.4 41.2   PLATELETS 10*3/mm3 181 183     Results from last 7 days   Lab Units 01/10/24  0542   INR  0.99   APTT seconds 22.3*         Assessment:  Coronary artery disease  Paroxysmal atrial fibrillation with RVR  Cardiomyopathy  Hyperlipidemia      Plan:  Converted to SR on amiodarone IV-will continue IV today and switch to po tomorrow.  Will proceed with C today, npo now, last dose lovenox 0200, will stop now.   Continue aspirin, plavix, statin, entresto, metoprolol    Procedures, risks, and options of cardiac cath explained to patient, including but not limited to MI, Stroke, death, infections, hemorrhage. Patient understands well and agrees, all questions answered.      )1/11/2024  SAHNON [place]/Transcription:   \"Dictated utilizing Dragon dictation\".     "

## 2024-01-11 NOTE — PLAN OF CARE
Goal Outcome Evaluation:  Plan of Care Reviewed With: patient        Progress: improving         Problem: Adult Inpatient Plan of Care  Goal: Plan of Care Review  Outcome: Ongoing, Progressing  Flowsheets (Taken 1/11/2024 0613)  Progress: improving  Plan of Care Reviewed With: patient  Goal: Patient-Specific Goal (Individualized)  Outcome: Ongoing, Progressing  Goal: Absence of Hospital-Acquired Illness or Injury  Outcome: Ongoing, Progressing  Intervention: Identify and Manage Fall Risk  Recent Flowsheet Documentation  Taken 1/11/2024 0600 by Luis Zavala RN  Safety Promotion/Fall Prevention:   activity supervised   assistive device/personal items within reach   clutter free environment maintained   fall prevention program maintained   nonskid shoes/slippers when out of bed   room organization consistent   safety round/check completed  Taken 1/11/2024 0506 by Luis Zavala RN  Safety Promotion/Fall Prevention:   activity supervised   assistive device/personal items within reach   clutter free environment maintained   fall prevention program maintained   nonskid shoes/slippers when out of bed   room organization consistent   safety round/check completed  Taken 1/11/2024 0400 by Luis Zavala RN  Safety Promotion/Fall Prevention:   activity supervised   assistive device/personal items within reach   clutter free environment maintained   fall prevention program maintained   nonskid shoes/slippers when out of bed   room organization consistent   safety round/check completed  Taken 1/11/2024 0324 by Luis Zavala RN  Safety Promotion/Fall Prevention:   activity supervised   assistive device/personal items within reach   clutter free environment maintained   fall prevention program maintained   nonskid shoes/slippers when out of bed   room organization consistent   safety round/check completed  Taken 1/11/2024 0250 by Luis Zavala RN  Safety Promotion/Fall Prevention:   activity supervised   assistive  device/personal items within reach   clutter free environment maintained   fall prevention program maintained   nonskid shoes/slippers when out of bed   room organization consistent   safety round/check completed  Taken 1/11/2024 0200 by Luis Zavala RN  Safety Promotion/Fall Prevention:   activity supervised   assistive device/personal items within reach   clutter free environment maintained   fall prevention program maintained   nonskid shoes/slippers when out of bed   room organization consistent   safety round/check completed  Taken 1/11/2024 0051 by Luis Zavala RN  Safety Promotion/Fall Prevention:   activity supervised   assistive device/personal items within reach   clutter free environment maintained   fall prevention program maintained   nonskid shoes/slippers when out of bed   room organization consistent   safety round/check completed  Taken 1/11/2024 0010 by Luis Zavala RN  Safety Promotion/Fall Prevention:   activity supervised   assistive device/personal items within reach   clutter free environment maintained   fall prevention program maintained   nonskid shoes/slippers when out of bed   room organization consistent   safety round/check completed  Taken 1/10/2024 2358 by Luis Zavala RN  Safety Promotion/Fall Prevention:   activity supervised   assistive device/personal items within reach   clutter free environment maintained   fall prevention program maintained   nonskid shoes/slippers when out of bed   room organization consistent   safety round/check completed  Taken 1/10/2024 2259 by Luis Zavala RN  Safety Promotion/Fall Prevention:   activity supervised   assistive device/personal items within reach   clutter free environment maintained   fall prevention program maintained   nonskid shoes/slippers when out of bed   room organization consistent   safety round/check completed  Taken 1/10/2024 2200 by Luis Zavala RN  Safety Promotion/Fall Prevention:   activity supervised    assistive device/personal items within reach   clutter free environment maintained   fall prevention program maintained   nonskid shoes/slippers when out of bed   room organization consistent   safety round/check completed  Taken 1/10/2024 2113 by Luis Zavala RN  Safety Promotion/Fall Prevention:   activity supervised   assistive device/personal items within reach   clutter free environment maintained   fall prevention program maintained   nonskid shoes/slippers when out of bed   room organization consistent   safety round/check completed  Taken 1/10/2024 2030 by Luis Zavala RN  Safety Promotion/Fall Prevention:   activity supervised   assistive device/personal items within reach   clutter free environment maintained   fall prevention program maintained   nonskid shoes/slippers when out of bed   room organization consistent   safety round/check completed  Taken 1/10/2024 1940 by Luis Zavala RN  Safety Promotion/Fall Prevention:   activity supervised   assistive device/personal items within reach   clutter free environment maintained   fall prevention program maintained   nonskid shoes/slippers when out of bed   room organization consistent   safety round/check completed  Intervention: Prevent Skin Injury  Recent Flowsheet Documentation  Taken 1/11/2024 0600 by Luis Zavala RN  Body Position: position changed independently  Taken 1/11/2024 0506 by Luis Zavala RN  Body Position: position changed independently  Taken 1/11/2024 0400 by Luis Zavala RN  Body Position: position changed independently  Taken 1/11/2024 0324 by Luis Zavala RN  Body Position: position changed independently  Taken 1/11/2024 0250 by Luis Zavala RN  Body Position: position changed independently  Taken 1/11/2024 0200 by Luis Zavala RN  Body Position: position changed independently  Taken 1/11/2024 0051 by Luis Zavala RN  Body Position: position changed independently  Taken 1/11/2024 0010 by Luis Zavala  RN  Body Position: position changed independently  Taken 1/10/2024 2358 by Luis Zavala RN  Body Position: position changed independently  Taken 1/10/2024 2259 by Luis Zavala RN  Body Position: position changed independently  Taken 1/10/2024 2200 by Luis Zavala RN  Body Position: position changed independently  Taken 1/10/2024 2113 by Luis Zavala RN  Body Position: position changed independently  Taken 1/10/2024 2030 by Luis Zavala RN  Body Position: position changed independently  Taken 1/10/2024 1940 by Luis Zavala RN  Body Position: position changed independently  Intervention: Prevent and Manage VTE (Venous Thromboembolism) Risk  Recent Flowsheet Documentation  Taken 1/11/2024 0506 by Luis Zavala RN  Activity Management: ambulated to bathroom  Taken 1/10/2024 1940 by Luis Zavala RN  Activity Management:   ambulated in room   ambulated to bathroom   sitting, edge of bed   standing at bedside   dorsiflexion/plantar flexion performed  Range of Motion: active ROM (range of motion) encouraged  Intervention: Prevent Infection  Recent Flowsheet Documentation  Taken 1/11/2024 0600 by Luis Zavala RN  Infection Prevention:   rest/sleep promoted   single patient room provided  Taken 1/11/2024 0506 by Luis Zavala RN  Infection Prevention:   rest/sleep promoted   single patient room provided  Taken 1/11/2024 0400 by Luis Zavala RN  Infection Prevention:   rest/sleep promoted   single patient room provided  Taken 1/11/2024 0324 by Luis Zavala RN  Infection Prevention:   rest/sleep promoted   single patient room provided  Taken 1/11/2024 0250 by Luis Zavala RN  Infection Prevention:   rest/sleep promoted   single patient room provided  Taken 1/11/2024 0200 by Luis Zavala RN  Infection Prevention:   rest/sleep promoted   single patient room provided  Taken 1/11/2024 0051 by Luis Zavala RN  Infection Prevention:   rest/sleep promoted   single patient room provided  Taken  1/11/2024 0010 by Luis Zavala RN  Infection Prevention:   rest/sleep promoted   single patient room provided  Taken 1/10/2024 2358 by Luis Zavala RN  Infection Prevention:   rest/sleep promoted   single patient room provided  Taken 1/10/2024 2259 by Luis Zavala RN  Infection Prevention:   rest/sleep promoted   single patient room provided  Taken 1/10/2024 2200 by Luis Zavala RN  Infection Prevention:   rest/sleep promoted   single patient room provided  Taken 1/10/2024 2113 by Luis Zavala RN  Infection Prevention:   rest/sleep promoted   single patient room provided  Taken 1/10/2024 2030 by Luis Zavala RN  Infection Prevention:   rest/sleep promoted   single patient room provided  Taken 1/10/2024 1940 by Luis Zavala RN  Infection Prevention:   rest/sleep promoted   single patient room provided  Goal: Optimal Comfort and Wellbeing  Outcome: Ongoing, Progressing  Intervention: Provide Person-Centered Care  Recent Flowsheet Documentation  Taken 1/10/2024 1940 by Luis Zavala RN  Trust Relationship/Rapport:   care explained   choices provided   emotional support provided   empathic listening provided   questions answered   questions encouraged   reassurance provided   thoughts/feelings acknowledged  Goal: Readiness for Transition of Care  Outcome: Ongoing, Progressing     Problem: Adult Inpatient Plan of Care  Goal: Absence of Hospital-Acquired Illness or Injury  Intervention: Prevent Skin Injury  Recent Flowsheet Documentation  Taken 1/11/2024 0600 by Luis Zavala RN  Body Position: position changed independently  Taken 1/11/2024 0506 by Luis Zavala RN  Body Position: position changed independently  Taken 1/11/2024 0400 by Luis Zavala RN  Body Position: position changed independently  Taken 1/11/2024 0324 by Luis Zavala RN  Body Position: position changed independently  Taken 1/11/2024 0250 by Luis Zavala RN  Body Position: position changed independently  Taken  1/11/2024 0200 by Luis Zavala RN  Body Position: position changed independently  Taken 1/11/2024 0051 by Luis Zavala RN  Body Position: position changed independently  Taken 1/11/2024 0010 by Luis Zavala RN  Body Position: position changed independently  Taken 1/10/2024 2358 by Luis Zavala RN  Body Position: position changed independently  Taken 1/10/2024 2259 by Luis Zavala RN  Body Position: position changed independently  Taken 1/10/2024 2200 by Luis Zavala RN  Body Position: position changed independently  Taken 1/10/2024 2113 by Luis Zavala RN  Body Position: position changed independently  Taken 1/10/2024 2030 by Luis Zavala RN  Body Position: position changed independently  Taken 1/10/2024 1940 by Luis Zavala RN  Body Position: position changed independently     Problem: Adult Inpatient Plan of Care  Goal: Absence of Hospital-Acquired Illness or Injury  Intervention: Prevent and Manage VTE (Venous Thromboembolism) Risk  Recent Flowsheet Documentation  Taken 1/11/2024 0506 by Luis Zavala RN  Activity Management: ambulated to bathroom  Taken 1/10/2024 1940 by Luis Zavala RN  Activity Management:   ambulated in room   ambulated to bathroom   sitting, edge of bed   standing at bedside   dorsiflexion/plantar flexion performed  Range of Motion: active ROM (range of motion) encouraged     Problem: Adult Inpatient Plan of Care  Goal: Absence of Hospital-Acquired Illness or Injury  Intervention: Prevent Infection  Recent Flowsheet Documentation  Taken 1/11/2024 0600 by Luis Zavala RN  Infection Prevention:   rest/sleep promoted   single patient room provided  Taken 1/11/2024 0506 by Luis Zavala RN  Infection Prevention:   rest/sleep promoted   single patient room provided  Taken 1/11/2024 0400 by Luis Zavala RN  Infection Prevention:   rest/sleep promoted   single patient room provided  Taken 1/11/2024 0324 by Luis Zavala RN  Infection Prevention:    rest/sleep promoted   single patient room provided  Taken 1/11/2024 0250 by Luis Zavala RN  Infection Prevention:   rest/sleep promoted   single patient room provided  Taken 1/11/2024 0200 by Luis Zavala RN  Infection Prevention:   rest/sleep promoted   single patient room provided  Taken 1/11/2024 0051 by Luis Zavala RN  Infection Prevention:   rest/sleep promoted   single patient room provided  Taken 1/11/2024 0010 by Luis Zavala RN  Infection Prevention:   rest/sleep promoted   single patient room provided  Taken 1/10/2024 2358 by Luis Zavala RN  Infection Prevention:   rest/sleep promoted   single patient room provided  Taken 1/10/2024 2259 by Luis Zavala RN  Infection Prevention:   rest/sleep promoted   single patient room provided  Taken 1/10/2024 2200 by Luis Zavala RN  Infection Prevention:   rest/sleep promoted   single patient room provided  Taken 1/10/2024 2113 by Luis Zavala RN  Infection Prevention:   rest/sleep promoted   single patient room provided  Taken 1/10/2024 2030 by Luis Zavala RN  Infection Prevention:   rest/sleep promoted   single patient room provided  Taken 1/10/2024 1940 by Luis Zavala RN  Infection Prevention:   rest/sleep promoted   single patient room provided     Problem: Adult Inpatient Plan of Care  Goal: Optimal Comfort and Wellbeing  Outcome: Ongoing, Progressing  Intervention: Provide Person-Centered Care  Recent Flowsheet Documentation  Taken 1/10/2024 1940 by Luis Zavala RN  Trust Relationship/Rapport:   care explained   choices provided   emotional support provided   empathic listening provided   questions answered   questions encouraged   reassurance provided   thoughts/feelings acknowledged     Problem: Adult Inpatient Plan of Care  Goal: Readiness for Transition of Care  Outcome: Ongoing, Progressing     Problem: Asthma Comorbidity  Goal: Maintenance of Asthma Control  Outcome: Ongoing, Progressing  Intervention: Maintain Asthma  Symptom Control  Recent Flowsheet Documentation  Taken 1/10/2024 1940 by Luis Zavala RN  Medication Review/Management: medications reviewed     Problem: Behavioral Health Comorbidity  Goal: Maintenance of Behavioral Health Symptom Control  Outcome: Ongoing, Progressing  Intervention: Maintain Behavioral Health Symptom Control  Recent Flowsheet Documentation  Taken 1/10/2024 1940 by Luis Zavala RN  Medication Review/Management: medications reviewed     Problem: COPD (Chronic Obstructive Pulmonary Disease) Comorbidity  Goal: Maintenance of COPD Symptom Control  Outcome: Ongoing, Progressing  Intervention: Maintain COPD-Symptom Control  Recent Flowsheet Documentation  Taken 1/10/2024 1940 by Luis Zavala RN  Medication Review/Management: medications reviewed     Problem: Diabetes Comorbidity  Goal: Blood Glucose Level Within Targeted Range  Outcome: Ongoing, Progressing     Problem: Heart Failure Comorbidity  Goal: Maintenance of Heart Failure Symptom Control  Outcome: Ongoing, Progressing  Intervention: Maintain Heart Failure-Management  Recent Flowsheet Documentation  Taken 1/10/2024 1940 by Luis Zavala RN  Medication Review/Management: medications reviewed     Problem: Hypertension Comorbidity  Goal: Blood Pressure in Desired Range  Outcome: Ongoing, Progressing  Intervention: Maintain Blood Pressure Management  Recent Flowsheet Documentation  Taken 1/10/2024 1940 by Luis Zavala RN  Medication Review/Management: medications reviewed     Problem: Obstructive Sleep Apnea Risk or Actual Comorbidity Management  Goal: Unobstructed Breathing During Sleep  Outcome: Ongoing, Progressing     Problem: Osteoarthritis Comorbidity  Goal: Maintenance of Osteoarthritis Symptom Control  Outcome: Ongoing, Progressing  Intervention: Maintain Osteoarthritis Symptom Control  Recent Flowsheet Documentation  Taken 1/11/2024 0506 by Luis Zavala RN  Activity Management: ambulated to bathroom  Taken 1/10/2024 1940 by  Luis Zavala RN  Activity Management:   ambulated in room   ambulated to bathroom   sitting, edge of bed   standing at bedside   dorsiflexion/plantar flexion performed  Medication Review/Management: medications reviewed     Problem: Pain Chronic (Persistent) (Comorbidity Management)  Goal: Acceptable Pain Control and Functional Ability  Outcome: Ongoing, Progressing  Intervention: Manage Persistent Pain  Recent Flowsheet Documentation  Taken 1/10/2024 1940 by Luis Zavala RN  Medication Review/Management: medications reviewed  Intervention: Optimize Psychosocial Wellbeing  Recent Flowsheet Documentation  Taken 1/10/2024 1940 by Luis Zavala RN  Family/Support System Care:   self-care encouraged   support provided     Problem: Seizure Disorder Comorbidity  Goal: Maintenance of Seizure Control  Outcome: Ongoing, Progressing        Plan of care reviewed with patient, who verbalized understanding.  Pt denies pain or discomfort at this time.  Pt is able to turn and position herself, and can ambulate with standby assistance. Pt would probably be independent with ambulation without the IV pole and various medical devices still connected to her.  Pt remains on the Amiodarone gtt, but has converted to sinus rhythm overnight.  Situation reviewed with NP Megan Gonzalez.  Instructions given to leave the Amiodarone gtt infusing as per orders.  Pt is tolerating her cardiac diet without nausea. Pt has had a bowel movement 1/10.

## 2024-01-12 ENCOUNTER — READMISSION MANAGEMENT (OUTPATIENT)
Dept: CALL CENTER | Facility: HOSPITAL | Age: 79
End: 2024-01-12
Payer: MEDICARE

## 2024-01-12 VITALS
RESPIRATION RATE: 13 BRPM | HEIGHT: 63 IN | DIASTOLIC BLOOD PRESSURE: 46 MMHG | OXYGEN SATURATION: 96 % | TEMPERATURE: 97.6 F | SYSTOLIC BLOOD PRESSURE: 111 MMHG | BODY MASS INDEX: 28.55 KG/M2 | WEIGHT: 161.16 LBS | HEART RATE: 57 BPM

## 2024-01-12 PROBLEM — I5A NONISCHEMIC NONTRAUMATIC MYOCARDIAL INJURY: Status: ACTIVE | Noted: 2024-01-12

## 2024-01-12 PROBLEM — I21.4 ACUTE NON Q WAVE MI (MYOCARDIAL INFARCTION), INITIAL EPISODE OF CARE: Status: ACTIVE | Noted: 2024-01-12

## 2024-01-12 PROBLEM — I49.9 CARDIAC ARRHYTHMIA: Status: ACTIVE | Noted: 2024-01-12

## 2024-01-12 PROBLEM — I21.A1 TYPE 2 MYOCARDIAL INFARCTION: Status: ACTIVE | Noted: 2024-01-12

## 2024-01-12 LAB
ACT BLD: 298 SECONDS (ref 82–152)
ANION GAP SERPL CALCULATED.3IONS-SCNC: 8.7 MMOL/L (ref 5–15)
BASOPHILS # BLD AUTO: 0.02 10*3/MM3 (ref 0–0.2)
BASOPHILS NFR BLD AUTO: 0.5 % (ref 0–1.5)
BUN SERPL-MCNC: 17 MG/DL (ref 8–23)
BUN/CREAT SERPL: 22.4 (ref 7–25)
CALCIUM SPEC-SCNC: 8.9 MG/DL (ref 8.6–10.5)
CHLORIDE SERPL-SCNC: 113 MMOL/L (ref 98–107)
CO2 SERPL-SCNC: 22.3 MMOL/L (ref 22–29)
CREAT SERPL-MCNC: 0.76 MG/DL (ref 0.57–1)
DEPRECATED RDW RBC AUTO: 39.9 FL (ref 37–54)
EGFRCR SERPLBLD CKD-EPI 2021: 81.3 ML/MIN/1.73
EOSINOPHIL # BLD AUTO: 0.05 10*3/MM3 (ref 0–0.4)
EOSINOPHIL NFR BLD AUTO: 1.2 % (ref 0.3–6.2)
ERYTHROCYTE [DISTWIDTH] IN BLOOD BY AUTOMATED COUNT: 11.8 % (ref 12.3–15.4)
GLUCOSE SERPL-MCNC: 85 MG/DL (ref 65–99)
HCT VFR BLD AUTO: 37.7 % (ref 34–46.6)
HGB BLD-MCNC: 12 G/DL (ref 12–15.9)
IMM GRANULOCYTES # BLD AUTO: 0.01 10*3/MM3 (ref 0–0.05)
IMM GRANULOCYTES NFR BLD AUTO: 0.2 % (ref 0–0.5)
LYMPHOCYTES # BLD AUTO: 1.71 10*3/MM3 (ref 0.7–3.1)
LYMPHOCYTES NFR BLD AUTO: 40.7 % (ref 19.6–45.3)
MCH RBC QN AUTO: 29.6 PG (ref 26.6–33)
MCHC RBC AUTO-ENTMCNC: 31.8 G/DL (ref 31.5–35.7)
MCV RBC AUTO: 92.9 FL (ref 79–97)
MONOCYTES # BLD AUTO: 0.35 10*3/MM3 (ref 0.1–0.9)
MONOCYTES NFR BLD AUTO: 8.3 % (ref 5–12)
NEUTROPHILS NFR BLD AUTO: 2.06 10*3/MM3 (ref 1.7–7)
NEUTROPHILS NFR BLD AUTO: 49.1 % (ref 42.7–76)
NRBC BLD AUTO-RTO: 0 /100 WBC (ref 0–0.2)
PLATELET # BLD AUTO: 161 10*3/MM3 (ref 140–450)
PMV BLD AUTO: 10.2 FL (ref 6–12)
POTASSIUM SERPL-SCNC: 3.9 MMOL/L (ref 3.5–5.2)
RBC # BLD AUTO: 4.06 10*6/MM3 (ref 3.77–5.28)
SODIUM SERPL-SCNC: 144 MMOL/L (ref 136–145)
WBC NRBC COR # BLD AUTO: 4.2 10*3/MM3 (ref 3.4–10.8)

## 2024-01-12 PROCEDURE — 93010 ELECTROCARDIOGRAM REPORT: CPT | Performed by: INTERNAL MEDICINE

## 2024-01-12 PROCEDURE — 80048 BASIC METABOLIC PNL TOTAL CA: CPT | Performed by: INTERNAL MEDICINE

## 2024-01-12 PROCEDURE — 93005 ELECTROCARDIOGRAM TRACING: CPT

## 2024-01-12 PROCEDURE — 85025 COMPLETE CBC W/AUTO DIFF WBC: CPT | Performed by: INTERNAL MEDICINE

## 2024-01-12 PROCEDURE — 25010000002 AMIODARONE IN DEXTROSE 5% 360-4.14 MG/200ML-% SOLUTION

## 2024-01-12 PROCEDURE — 99238 HOSP IP/OBS DSCHRG MGMT 30/<: CPT

## 2024-01-12 RX ORDER — AMIODARONE HYDROCHLORIDE 200 MG/1
200 TABLET ORAL
Status: DISCONTINUED | OUTPATIENT
Start: 2024-01-12 | End: 2024-01-12 | Stop reason: HOSPADM

## 2024-01-12 RX ORDER — AMIODARONE HYDROCHLORIDE 200 MG/1
200 TABLET ORAL
Qty: 30 TABLET | Refills: 5 | Status: SHIPPED | OUTPATIENT
Start: 2024-01-13

## 2024-01-12 RX ORDER — ATORVASTATIN CALCIUM 40 MG/1
40 TABLET, FILM COATED ORAL DAILY
Qty: 30 TABLET | Refills: 5 | Status: SHIPPED | OUTPATIENT
Start: 2024-01-12

## 2024-01-12 RX ADMIN — AMIODARONE HYDROCHLORIDE 0.5 MG/MIN: 1.8 INJECTION, SOLUTION INTRAVENOUS at 02:01

## 2024-01-12 RX ADMIN — ASPIRIN 81 MG: 81 TABLET, COATED ORAL at 14:06

## 2024-01-12 RX ADMIN — MAGNESIUM OXIDE 400 MG (241.3 MG MAGNESIUM) TABLET 400 MG: TABLET at 09:25

## 2024-01-12 RX ADMIN — PANTOPRAZOLE SODIUM 40 MG: 40 TABLET, DELAYED RELEASE ORAL at 05:35

## 2024-01-12 RX ADMIN — AMIODARONE HYDROCHLORIDE 200 MG: 200 TABLET ORAL at 09:21

## 2024-01-12 RX ADMIN — PRAVASTATIN SODIUM 80 MG: 40 TABLET ORAL at 09:25

## 2024-01-12 RX ADMIN — SACUBITRIL AND VALSARTAN 1 TABLET: 24; 26 TABLET, FILM COATED ORAL at 01:02

## 2024-01-12 RX ADMIN — ACETAMINOPHEN 650 MG: 325 TABLET ORAL at 10:52

## 2024-01-12 RX ADMIN — Medication 10 ML: at 09:25

## 2024-01-12 RX ADMIN — CLOPIDOGREL BISULFATE 75 MG: 75 TABLET, FILM COATED ORAL at 09:25

## 2024-01-12 RX ADMIN — SACUBITRIL AND VALSARTAN 1 TABLET: 24; 26 TABLET, FILM COATED ORAL at 09:25

## 2024-01-12 NOTE — PROGRESS NOTES
"Daily progress note    Referring physician  Dr. BARTH     Subjective  Awake and alert and feeling better with no new complaint and wants to go home.  Patient denies any chest pain shortness of breath palpitation    History of present illness  78-year-old white female with history of paroxysmal atrial fibrillation coronary artery disease hypertension hyperlipidemia admitted through emergency room to the observation unit with palpitation dizziness and thought she is current passed out.  Patient also have some shortness of breath but no chest pain.  Patient workup revealed rapid ventricular rate with history of atrial fibrillation and received diltiazem IV fluid and now admitted to cardiology service and I am asked to follow patient for medical problem.  At the time of examination she looks anxious but no complaints.  Patient denies any fever chills abdominal pain nausea vomiting diarrhea.    Review of Systems:  Unremarkable    Physical Exam:   Blood pressure 111/46, pulse 57, temperature 97.6 °F (36.4 °C), temperature source Oral, resp. rate 13, height 160 cm (63\"), weight 73.1 kg (161 lb 2.5 oz), SpO2 96%.    GENERAL: Pleasant cooperative and conversant female, alert, no acute distress  SKIN: Warm, dry  HENT: Normocephalic, atraumatic  EYES: no scleral icterus, EOMI, no conjunctivitis  CV: irregular rhythm, regular rate  RESPIRATORY: normal effort, diminished at the bases with no wheezing and no rhonchi  ABDOMEN: soft, nontender, nondistended bowel sounds positive  MUSCULOSKELETAL: no deformity  NEURO: alert, moves all extremities, follows commands    LABS  Lab Results (last 24 hours)       Procedure Component Value Units Date/Time    Basic Metabolic Panel [[id number]]  (Abnormal) Collected: 01/12/24 0414    Specimen: Blood Updated: 01/12/24 0543     Glucose 85 mg/dL      BUN 17 mg/dL      Creatinine 0.76 mg/dL      Sodium 144 mmol/L      Potassium 3.9 mmol/L      Comment: Slight hemolysis detected by analyzer. Result may " be falsely elevated.        Chloride 113 mmol/L      CO2 22.3 mmol/L      Calcium 8.9 mg/dL      BUN/Creatinine Ratio 22.4     Anion Gap 8.7 mmol/L      eGFR 81.3 mL/min/1.73     Narrative:      GFR Normal >60  Chronic Kidney Disease <60  Kidney Failure <15    The GFR formula is only valid for adults with stable renal function between ages 18 and 70.    CBC & Differential [[id number]]  (Abnormal) Collected: 01/12/24 0414    Specimen: Blood Updated: 01/12/24 0528    Narrative:      The following orders were created for panel order CBC & Differential.  Procedure                               Abnormality         Status                     ---------                               -----------         ------                     CBC Auto Differential[[id number]]        Abnormal            Final result                 Please view results for these tests on the individual orders.    CBC Auto Differential [[id number]]  (Abnormal) Collected: 01/12/24 0414    Specimen: Blood Updated: 01/12/24 0528     WBC 4.20 10*3/mm3      RBC 4.06 10*6/mm3      Hemoglobin 12.0 g/dL      Hematocrit 37.7 %      MCV 92.9 fL      MCH 29.6 pg      MCHC 31.8 g/dL      RDW 11.8 %      RDW-SD 39.9 fl      MPV 10.2 fL      Platelets 161 10*3/mm3      Neutrophil % 49.1 %      Lymphocyte % 40.7 %      Monocyte % 8.3 %      Eosinophil % 1.2 %      Basophil % 0.5 %      Immature Grans % 0.2 %      Neutrophils, Absolute 2.06 10*3/mm3      Lymphocytes, Absolute 1.71 10*3/mm3      Monocytes, Absolute 0.35 10*3/mm3      Eosinophils, Absolute 0.05 10*3/mm3      Basophils, Absolute 0.02 10*3/mm3      Immature Grans, Absolute 0.01 10*3/mm3      nRBC 0.0 /100 WBC           Imaging Results (Last 24 Hours)       ** No results found for the last 24 hours. **          Results  Scan on 1/10/2024 0706 by [facility], [place]: ECG 12-LEAD         Author: -- Service: -- Author Type: --   Filed: Date of Service: Creation Time:   Status: (Other)   HEART RATE= 92  bpm  RR  Interval= 652  ms  TX Interval=   ms  P Horizontal Axis=   deg  P Front Axis=   deg  QRSD Interval= 137  ms  QT Interval= 407  ms  QTcB= 504  ms  QRS Axis= -36  deg  T Wave Axis= 93  deg  - ABNORMAL ECG -  Atrial fibrillation - new  Left bundle branch block          Current Facility-Administered Medications:     acetaminophen (TYLENOL) tablet 650 mg, 650 mg, Oral, Q4H PRN, Teo Orta MD, 650 mg at 01/12/24 1052    amiodarone (PACERONE) tablet 200 mg, 200 mg, Oral, Q24H, Teo Orta MD, 200 mg at 01/12/24 0921    aspirin EC tablet 81 mg, 81 mg, Oral, Daily, Teo Orta MD, 81 mg at 01/11/24 0852    clopidogrel (PLAVIX) tablet 75 mg, 75 mg, Oral, Daily, Teo Orta MD, 75 mg at 01/12/24 0925    magnesium oxide (MAG-OX) tablet 400 mg, 400 mg, Oral, Daily, Teo Orta MD, 400 mg at 01/12/24 0925    metoprolol succinate XL (TOPROL-XL) 24 hr tablet 25 mg, 25 mg, Oral, Daily, Teo Orta MD, 25 mg at 01/11/24 0852    nitroglycerin (NITROSTAT) SL tablet 0.4 mg, 0.4 mg, Sublingual, Q5 Min PRN, Teo Orta MD    pantoprazole (PROTONIX) EC tablet 40 mg, 40 mg, Oral, Q AM, Teo Orta MD, 40 mg at 01/12/24 0535    pravastatin (PRAVACHOL) tablet 80 mg, 80 mg, Oral, Daily, Teo Orta MD, 80 mg at 01/12/24 0925    sacubitril-valsartan (ENTRESTO) 24-26 MG tablet 1 tablet, 1 tablet, Oral, BID, Teo Orta MD, 1 tablet at 01/12/24 0925    [COMPLETED] Insert Peripheral IV, , , Once **AND** sodium chloride 0.9 % flush 10 mL, 10 mL, Intravenous, PRN, Teo Orta MD    sodium chloride 0.9 % flush 10 mL, 10 mL, Intravenous, Q12H, Teo Orta MD, 10 mL at 01/12/24 0925    sodium chloride 0.9 % flush 10 mL, 10 mL, Intravenous, PRN, eTo Orta MD    sodium chloride 0.9 % infusion 40 mL, 40 mL, Intravenous, PRN, Teo Orta MD     ASSESSMENT  Coronary artery disease s/p PCI  Paroxysmal  atrial fibrillation with rapid ventricular rate  Coronary artery disease  Cardiomyopathy  Hypertension  Hyperlipidemia   Irritable bowel syndrome  Gastroesophageal reflux disease    PLAN  CPM  Post PCI care  Continue amiodarone  Continue anticoagulation  Continue aspirin Plavix Toprol-XL Entresto and Pravachol  Stress ulcer DVT prophylaxis  Supportive care  Discussed with nursing staff and cardiology  Discharge planning    MIREILLE MCDANIEL MD    Copied text in this note has been reviewed and is accurate as of 01/12/24

## 2024-01-12 NOTE — OUTREACH NOTE
Prep Survey      Flowsheet Row Responses   [facility] facility patient discharged from? [place]   Is LACE score < 7 ? No   Eligibility Readm Mgmt   Discharge diagnosis Atrial fibrillation (   Does the patient have one of the following disease processes/diagnoses(primary or secondary)? Other   Does the patient have Home health ordered? No   Is there a DME ordered? No   Prep survey completed? Yes            SHELDON CORTEZ - Registered Nurse

## 2024-01-12 NOTE — DISCHARGE INSTRUCTIONS
Discharge Home Instructions:    Routine post cardiac catheterization/PCI discharge home care instructions:     1. No submerging procedure site below water for 7-10 days.  2. No lifting objects greater than 1 lbs for 3 days.  3. If groin site used, avoid climbing several flights of stairs or sitting for longer than 2 hours at a time for the next 24 hours.   4. Monitor puncture site for bleeding and/or knots;. If bleeding should occur at the groin site: lie flat, apply pressure and return to the ER. If bleeding should occur at the wrist site, apply pressure and return to the ER.  5.  You may apply a DRY Band-Aid over the puncture site if needed. Do not apply any lotions, salves or ointments to site.  6. No driving for 3 days.  7. Return to ER for recurrent symptoms.  8. No smoking.  9. Take all medications as prescribed.

## 2024-01-12 NOTE — DISCHARGE SUMMARY
[facility]  Physician Discharge Summary    Patient Identification:  Name: Lili Ruiz  Age: 78 y.o.  Sex: female  :  1945  MRN: [id number]    Admit date: 1/10/2024    Discharge date and time:  24 1:54      Admitting Physician: Teo Orta MD     Discharge Physician: Megan CLAUDIO    Discharge Diagnoses:   Active Hospital Problems    Diagnosis     **Atrial fibrillation     Precordial pain     Acute non Q wave MI (myocardial infarction), initial episode of care     Coronary artery disease of bypass graft of native heart with stable angina pectoris        Discharged Condition: stable    Hospital Course:     This is a 78-year-old female admitted with A-fib with RVR and chest pain.  Please see H&P for full details of admission she was started on amiodarone IV as well as Lovenox, she converted to sinus rhythm.  Troponins were mildly elevated and considering she presented with chest pain and had a previous 50% ostial LAD was decided to proceed with cardiac catheterization. She underwent cardiac catheterization 2024 normal left main, LAD ostial 70% stenosis reduced to 0% with drug-eluting stent, minimal irregularity of the rest of the LAD diagonal  branches.  Circumflex nondominant normal, RCA dominant normal.  She has recovered from cardiac catheterization as expected and without complication.  Considering high risk with triple therapy, will stop aspirin and start Eliquis 5 mg twice daily.  She will remain on Plavix 75 mg daily.  Vital signs stable on day of discharge.  She is cardiovascular stable for discharge with outpatient follow-up.    Consults:   IP CONSULT TO CARDIOLOGY  IP CONSULT TO INTERNAL MEDICINE    Discharge Exam:  General: No acute distress   Skin: Warm and dry, no diaphoresis noted   EYES: PERTL   HEENT: external ear and nose normal; oral mucosa moist   Neck: Supple; no carotid bruits; no JVD   Heart: S1S2 regular rate and rhythm; no murmurs;  no gallop or rub appreciated   Pulmonary: Respirations regular, unlabored at rest, bilateral breath sounds have good air entry throughout all lung fields; no crackles, rubs or wheezes auscultated.     GI: Soft, non-tender, non-distended, positive bowel sounds  No hepatosplenomegaly   Extremities: Bilateral lower extremities have no pre-tibial pitting edema; DP/PT pulses are palpable   Neurological: Alert and oriented x 3; no neuro deficits          LABS:  Results from last 7 days   Lab Units 01/11/24  0634 01/10/24  0809 01/10/24  0612   HSTROP T ng/L 30* 34* 22*     Results from last 7 days   Lab Units 01/10/24  0612   MAGNESIUM mg/dL 2.0     Results from last 7 days   Lab Units 01/12/24  0414   SODIUM mmol/L 144   POTASSIUM mmol/L 3.9   BUN mg/dL 17   CREATININE mg/dL 0.76   CALCIUM mg/dL 8.9     @LABRCNTbnp@  Results from last 7 days   Lab Units 01/12/24  0414 01/11/24  0634 01/10/24  0542   WBC 10*3/mm3 4.20 4.96 5.42   HEMOGLOBIN g/dL 12.0 12.3 13.2   HEMATOCRIT % 37.7 39.4 41.2   PLATELETS 10*3/mm3 161 181 183     Results from last 7 days   Lab Units 01/10/24  0542   INR  0.99   APTT seconds 22.3*     Results from last 7 days   Lab Units 01/11/24  0634   CHOLESTEROL mg/dL 149   TRIGLYCERIDES mg/dL 59   HDL CHOL mg/dL 56   LDL CHOL mg/dL 81     Disposition:  Home    Discharge Medications:      Discharge Medications        New Medications        Instructions Start Date   amiodarone 200 MG tablet  Commonly known as: PACERONE   200 mg, Oral, Every 24 Hours Scheduled   Start Date: January 13, 2024     apixaban 5 MG tablet tablet  Commonly known as: ELIQUIS   5 mg, Oral, 2 Times Daily      atorvastatin 40 MG tablet  Commonly known as: LIPITOR   40 mg, Oral, Daily             Continue These Medications        Instructions Start Date   CALCIUM 600 PO   1 tablet, Oral, 2 Times Daily      clopidogrel 75 MG tablet  Commonly known as: PLAVIX   75 mg, Oral, Daily      Entresto 24-26 MG tablet  Generic drug:  sacubitril-valsartan   1 tablet, Oral, 2 Times Daily      magnesium oxide 400 MG tablet  Commonly known as: MAG-OX   400 mg, Oral, Daily      metoprolol succinate XL 25 MG 24 hr tablet  Commonly known as: TOPROL-XL   25 mg, Oral, Daily      nitroglycerin 0.4 MG SL tablet  Commonly known as: NITROSTAT   0.4 mg, Sublingual, Every 5 Minutes PRN, Take no more than 3 doses in 15 minutes.      pantoprazole 40 MG EC tablet  Commonly known as: PROTONIX   40 mg, Oral, Daily      PROBIOTIC DAILY PO   1 tablet, Oral, 2 Times Daily      Unable to find   1 each, Oral, Once, IB Valerio 2 Tables Daily       vitamin B-12 500 MCG tablet  Commonly known as: CYANOCOBALAMIN   500 mcg, Oral, Daily      vitamin C 250 MG tablet  Commonly known as: ASCORBIC ACID   500 mg, Oral, Daily      Vitamin D3 50 MCG (2000 UT) tablet   1 tablet, Oral, Daily             Stop These Medications      aspirin 81 MG EC tablet     omeprazole 20 MG capsule  Commonly known as: priLOSEC     pravastatin 80 MG tablet  Commonly known as: PRAVACHOL                Discharge Home Instructions:  1. Follow up with Jan 26     Routine post cardiac catheterization/PCI discharge home care instructions:    1. No submerging procedure site below water for 7-10 days.  2. No lifting objects greater than 1 lbs for 3 days.  3. If groin site used, avoid climbing several flights of stairs or sitting for longer than 2 hours at a time for the next 24 hours.   4. Monitor puncture site for bleeding and/or knots;. If bleeding should occur at the groin site: lie flat, apply pressure and return to the ER. If bleeding should occur at the wrist site, apply pressure and return to the ER.  5.  You may apply a DRY Band-Aid over the puncture site if needed. Do not apply any lotions, salves or ointments to site.  6. No driving for 3 days.  7. Return to ER for recurrent symptoms.  8. No smoking.  9. Take all medications as prescribed.      Signed:  Megan Carmona, APRN  1/12/2024  13:53  "EST      EMR Dragon/Transcription:   \"Dictated utilizing Dragon dictation\".     "

## 2024-01-15 LAB
QT INTERVAL: 490 MS
QTC INTERVAL: 461 MS

## 2024-01-17 ENCOUNTER — READMISSION MANAGEMENT (OUTPATIENT)
Dept: CALL CENTER | Facility: HOSPITAL | Age: 79
End: 2024-01-17
Payer: MEDICARE

## 2024-01-17 DIAGNOSIS — I21.4 NSTEMI (NON-ST ELEVATED MYOCARDIAL INFARCTION): Primary | ICD-10-CM

## 2024-01-17 NOTE — OUTREACH NOTE
Medical Week 1 Survey      Flowsheet Row Responses   [facility], [place] patient discharged from? [place]   Does the patient have one of the following disease processes/diagnoses(primary or secondary)? Other   Week 1 attempt successful? Yes   Call start time 0840   Call end time 0841   Discharge diagnosis Atrial fibrillation   Person spoke with today (if not patient) and relationship pt   Meds reviewed with patient/caregiver? Yes   Does the patient have all medications ordered at discharge? Yes   Prescription comments no concerns or questions noted.   Is the patient taking all medications as directed (includes completed medication regime)? Yes   Does the patient have a primary care provider?  Yes   Comments regarding PCP 1/26/2024  1:00 PM  HOSPITAL FOLLOW UP 30 min [facility] CARDIOLOGY Megan Carmona APRN   Has home health visited the patient within 72 hours of discharge? N/A   Psychosocial issues? No   Did the patient receive a copy of their discharge instructions? Yes   Nursing interventions Reviewed instructions with patient   What is the patient's perception of their health status since discharge? Improving   Is the patient/caregiver able to teach back signs and symptoms related to disease process for when to call PCP? Yes   Is the patient/caregiver able to teach back signs and symptoms related to disease process for when to call 911? Yes   Is the patient/caregiver able to teach back the hierarchy of who to call/visit for symptoms/problems? PCP, Specialist, Home health nurse, Urgent Care, ED, 911 Yes   Week 1 call completed? Yes   Graduated Yes   Wrap up additional comments patient reports doing very well. She will see cards on 01-26 no concerns or questions noted.   Call end time 0841            Greta GASTELUM - Registered Nurse

## 2024-01-24 ENCOUNTER — HOSPITAL ENCOUNTER (OUTPATIENT)
Dept: CARDIOLOGY | Facility: HOSPITAL | Age: 79
Discharge: HOME | End: 2024-01-24
Payer: MEDICARE

## 2024-01-24 VITALS — HEIGHT: 63 IN | WEIGHT: 161 LBS | BODY MASS INDEX: 28.53 KG/M2

## 2024-01-24 DIAGNOSIS — I21.4 NSTEMI (NON-ST ELEVATED MYOCARDIAL INFARCTION): ICD-10-CM

## 2024-01-24 LAB
BH CV ECHO MEAS - EDV(MOD-SP2): 108 ML
BH CV ECHO MEAS - EDV(MOD-SP4): 92 ML
BH CV ECHO MEAS - EF(MOD-BP): 54.3 %
BH CV ECHO MEAS - EF(MOD-SP2): 59.3 %
BH CV ECHO MEAS - EF(MOD-SP4): 54.3 %
BH CV ECHO MEAS - ESV(MOD-SP2): 44 ML
BH CV ECHO MEAS - ESV(MOD-SP4): 42 ML
BH CV ECHO MEAS - SV(MOD-SP2): 64 ML
BH CV ECHO MEAS - SV(MOD-SP4): 50 ML
BH CV VAS BP LEFT ARM: NORMAL MMHG

## 2024-01-24 PROCEDURE — 93308 TTE F-UP OR LMTD: CPT | Performed by: INTERNAL MEDICINE

## 2024-01-24 PROCEDURE — 93308 TTE F-UP OR LMTD: CPT

## 2024-01-26 ENCOUNTER — OFFICE VISIT (OUTPATIENT)
Dept: CARDIOLOGY | Facility: CLINIC | Age: 79
End: 2024-01-26
Payer: MEDICARE

## 2024-01-26 VITALS
BODY MASS INDEX: 29.77 KG/M2 | HEART RATE: 55 BPM | DIASTOLIC BLOOD PRESSURE: 71 MMHG | SYSTOLIC BLOOD PRESSURE: 111 MMHG | HEIGHT: 63 IN | WEIGHT: 168 LBS

## 2024-01-26 DIAGNOSIS — I25.708 CORONARY ARTERY DISEASE OF BYPASS GRAFT OF NATIVE HEART WITH STABLE ANGINA PECTORIS: ICD-10-CM

## 2024-01-26 DIAGNOSIS — Z79.01 CHRONIC ANTICOAGULATION: ICD-10-CM

## 2024-01-26 DIAGNOSIS — E78.00 PURE HYPERCHOLESTEROLEMIA: ICD-10-CM

## 2024-01-26 DIAGNOSIS — I42.0 CARDIOMYOPATHY, DILATED: ICD-10-CM

## 2024-01-26 DIAGNOSIS — I48.0 PAROXYSMAL ATRIAL FIBRILLATION: ICD-10-CM

## 2024-01-26 DIAGNOSIS — I10 PRIMARY HYPERTENSION: Primary | ICD-10-CM

## 2024-01-26 NOTE — PROGRESS NOTES
Subjective:        Lili Ruiz is a 78 y.o. female who here for follow up    Chief Complaint   Patient presents with    Hospital Follow Up Visit     STENT PLACEMENT F/U       HPI    This is a 78-year-old female with paroxysmal atrial fibrillation, coronary artery disease s/p JUDSON to the LAD on 1/11/2024, hypertension, hyperlipidemia.  She was admitted 1/10/2024 through 1/12/2024 for NSTEMI, A-fib with RVR.  She was started on IV amiodarone and Lovenox anticoagulation.  She underwent cardiac catheterization with angioplasty and stent to the LAD.  It was decided she was too high risk for triple therapy and was discharged home on Plavix and Eliquis, as well as amiodarone.    Cardiac catheterization 1/11/2024 normal left main, LAD ostial 70% stenosis reduced to 0% with JUDSON, minimal irregularity of the rest of the LAD diagonal  branches.  Circumflex nondominant normal.  RCA dominant normal        The following portions of the patient's history were reviewed and updated as appropriate: allergies, current medications, past family history, past medical history, past social history, past surgical history and problem list.    Past Medical History:   Diagnosis Date    Anxiety     Aortic valve insufficiency     Arthritis     Atrial fibrillation     Breast cancer     Cancer     Breast    Cataract     bilateral eyes    Depression     Diverticulosis     Human metapneumovirus (hMPV) pneumonia     Hyperlipidemia     Hypertension     Kidney stone     Low back pain     Non Q wave myocardial infarction 10/2023    Obesity     Peptic ulceration     Visual impairment          reports that she has quit smoking. She has never used smokeless tobacco. She reports that she does not drink alcohol and does not use drugs.     Family History   Problem Relation Age of Onset    Cancer Mother     Ovarian cancer Mother     Heart disease Father     Colon cancer Maternal Aunt        ROS     Review of Systems  Constitutional: No wt loss,  fever, fatigue  Gastrointestinal: No nausea, abdominal pain  Behavioral/Psych: No insomnia or anxiety  Cardiovascular no chest pain or shortness of breath      Objective:           Vitals and nursing note reviewed.   Constitutional:       Appearance: Well-developed.   HENT:      Head: Normocephalic.      Right Ear: External ear normal.      Left Ear: External ear normal.   Neck:      Vascular: No JVD.   Pulmonary:      Effort: Pulmonary effort is normal. No respiratory distress.      Breath sounds: Normal breath sounds. No stridor. No rales.   Cardiovascular:      Normal rate. Regular rhythm.      No gallop.    Pulses:     Intact distal pulses.   Edema:     Peripheral edema absent.   Abdominal:      General: Bowel sounds are normal. There is no distension.      Palpations: Abdomen is soft.      Tenderness: There is no abdominal tenderness. There is no guarding.   Musculoskeletal: Normal range of motion.         General: No tenderness.      Cervical back: Normal range of motion. Skin:     General: Skin is warm.   Neurological:      Mental Status: Alert and oriented to person, place, and time.      Deep Tendon Reflexes: Reflexes are normal and symmetric.   Psychiatric:         Judgment: Judgment normal.           ECG 12 Lead    Date/Time: 1/26/2024 1:06 PM  Performed by: Megan Carmona APRN    Authorized by: Megan Carmona APRN  Comparison: compared with previous ECG from 1/12/2024  Comparison to previous ECG: IVCD new when compared to 1/12/24, but present on 1/10/24 ECG  Rhythm: sinus rhythm  Rate: bradycardic  BPM: 56  Conduction: left bundle branch block    Clinical impression: abnormal EKG          Interpretation Summary         Left ventricular ejection fraction appears to be 51 - 55%.    Left ventricular diastolic function was normal.    There is calcification of the aortic valve.       The left main is normal left main.  The left anterior descending artery is ostial LAD 70% reduced to 0% with 3.5/12  Xience stent, normal LAD otherwise.  The left circumflex is nondominant and normal.  The right coronary artery is dominant and normal.  Successful stenting of the ostial left anterior descending artery, with reduction of stenosis from 70% to 0% stent used 3.5/12 Xience stent. Intravascular ultrasound suggested size as well as apposition of the stent appropriate    DAMIEN FLOW PRE..2..... POST...3...    TYPE OF LESION.....B........    RECOMMENDATIONS: Post-procedure care will focus on prevention of any ischemic events and congestive complications. Aggressive risk factor modification will be carried out.  Importance of taking dual antiplatelets for one year has been explained, risk of stent thrombosis leading to the acute MI, which carries high morbidity and mortality has been explained    Discontinuation or interruptions of these medications should be under the strict guidance of appropriate health professional  Interpretation Summary         Equivocal ECG evidence of myocardial ischemia.    Negative clinical evidence of myocardial ischemia.    Findings consistent with an equivocal ECG stress test.    Equivocal ECG evidence of myocardial ischemia. Negative clinical evidence of myocardial ischemia. Findings consistent with an equivocal ECG stress test    Interpretation Summary         WITH RARE PACS, PVCS, NSSVT       Left Ventriculography:     Estimated Ejection Fraction: 45%   Wall motion abnormalities: None   Mitral Regurgitation: None         Interpretation Summary         The left ventricular cavity is borderline dilated.    The following left ventricular wall segments are hypokinetic: mid anterior, apical anterior, mid anterolateral, apical lateral, mid inferolateral, apical inferior, mid inferior, apical septal, mid inferoseptal, apex hypokinetic and mid anteroseptal.    Left ventricular diastolic function is consistent with (grade I) impaired relaxation.    Estimated right ventricular systolic pressure from  tricuspid regurgitation is mildly elevated (35-45 mmHg).      Current Outpatient Medications:     amiodarone (PACERONE) 200 MG tablet, Take 1 tablet by mouth Daily., Disp: 30 tablet, Rfl: 5    apixaban (ELIQUIS) 5 MG tablet tablet, Take 1 tablet by mouth 2 (Two) Times a Day., Disp: 60 tablet, Rfl: 11    atorvastatin (LIPITOR) 40 MG tablet, Take 1 tablet by mouth Daily., Disp: 30 tablet, Rfl: 5    Calcium Carbonate (CALCIUM 600 PO), Take 1 tablet by mouth 2 (Two) Times a Day., Disp: , Rfl:     Cholecalciferol (VITAMIN D3) 2000 UNITS tablet, Take 1 tablet by mouth Daily., Disp: , Rfl:     clopidogrel (PLAVIX) 75 MG tablet, Take 1 tablet by mouth Daily., Disp: 30 tablet, Rfl: 6    magnesium oxide (MAG-OX) 400 MG tablet, Take 1 tablet by mouth Daily., Disp: , Rfl:     metoprolol succinate XL (TOPROL-XL) 25 MG 24 hr tablet, Take 1 tablet by mouth Daily., Disp: 30 tablet, Rfl: 11    nitroglycerin (NITROSTAT) 0.4 MG SL tablet, Place 1 tablet under the tongue Every 5 (Five) Minutes As Needed for Chest Pain (Systolic BP Greater Than 100). Take no more than 3 doses in 15 minutes., Disp: 25 tablet, Rfl: 12    pantoprazole (PROTONIX) 40 MG EC tablet, Take 1 tablet by mouth Daily., Disp: , Rfl:     Probiotic Product (PROBIOTIC DAILY PO), Take 1 tablet by mouth 2 (Two) Times a Day., Disp: , Rfl:     sacubitril-valsartan (Entresto) 24-26 MG tablet, Take 1 tablet by mouth 2 (Two) Times a Day., Disp: 60 tablet, Rfl: 11    Unable to find, Take 1 each by mouth 1 (One) Time. IB Valerio 2 Tables Daily, Disp: , Rfl:     vitamin B-12 (CYANOCOBALAMIN) 500 MCG tablet, Take 1 tablet by mouth Daily., Disp: , Rfl:     vitamin C (ASCORBIC ACID) 250 MG tablet, Take 2 tablets by mouth Daily., Disp: , Rfl:      Assessment:        Patient Active Problem List   Diagnosis    Visual impairment    Peptic ulceration    Low back pain    Kidney stone    Hypertension    Hyperlipidemia    Diverticulosis    Depression    Cancer    Atrial fibrillation     Arthritis    Anxiety    Human metapneumovirus (hMPV) pneumonia    Paroxysmal supraventricular tachycardia    Esophageal reflux    Ductal carcinoma in situ (DCIS) of right breast    Carotid artery stenosis    Family history of malignant neoplasm of gastrointestinal tract    Family history of malignant neoplasm of genital organ, other    Mitral annular calcification    Chronic anticoagulation    Dysphagia    Aortic insufficiency    Tricuspid regurgitation    Irritable bowel syndrome with diarrhea    NSTEMI (non-ST elevated myocardial infarction)    Coronary artery disease of bypass graft of native heart with stable angina pectoris    Precordial pain    Acute non Q wave MI (myocardial infarction), initial episode of care    Cardiac arrhythmia    Type 2 myocardial infarction    Nonischemic nontraumatic myocardial injury               Plan:   1.  Coronary artery disease: s/p PCI to LAD on 1/11/24. Discussed tmt, encouraged cardiac rehab. She will think about it.  Continue Plavix, not on aspirin due to Eliquis.  Continue metoprolol.    2. Paroxysmal atrial fibrillation: SR today in office, continue amiodarone 200mg daily, 3 months with amio check, may decrease to 100 at that time. AC on eliquis no s/s bleeding     Significant side effects associated with AMIODARONE therapy has been explained. Pros and cons of the medications has been discussed, decision has been to continue the medication   6 months to yearly checkup for eye examination, thyroid function test, chest x-ray and liver function test has been advised.  Patient understands well.    3. Cardiomyopathy: Euvolemic on exam.  Continue Entresto, Toprol    4. Hyperlipidemia: Lipid panel 1/11/2024 , HDL 56, LDL 81, trig 59, AST/ALT WNL.  Continue atorvastatin           No diagnosis found.    There are no diagnoses linked to this encounter.    COUNSELING: leslie Yu was given to patient for the following topics: diagnostic results, risk  factor reductions, impressions, risks and benefits of treatment options and importance of treatment compliance.        SMOKING COUNSELING: denies    Amio check in 3 months and follow-up    Sincerely,   SHANON Norwood  [facility]  01/26/24  12:59 EST    EMR Dragon/Transcription disclaimer:   Much of this encounter note is an electronic transcription/translation of spoken language to printed text. The electronic translation of spoken language may permit erroneous, or at times, nonsensical words or phrases to be inadvertently transcribed; Although I have reviewed the note for such errors, some may still exist.

## 2024-04-15 ENCOUNTER — HOSPITAL ENCOUNTER (OUTPATIENT)
Dept: GENERAL RADIOLOGY | Facility: HOSPITAL | Age: 79
Discharge: HOME | End: 2024-04-15
Payer: MEDICARE

## 2024-04-15 ENCOUNTER — LAB (OUTPATIENT)
Dept: LAB | Facility: HOSPITAL | Age: 79
End: 2024-04-15
Payer: MEDICARE

## 2024-04-15 LAB
ALBUMIN SERPL-MCNC: 4.2 G/DL (ref 3.5–5.2)
ALBUMIN/GLOB SERPL: 2.1 G/DL
ALP SERPL-CCNC: 50 U/L (ref 39–117)
ALT SERPL W P-5'-P-CCNC: 17 U/L (ref 1–33)
ANION GAP SERPL CALCULATED.3IONS-SCNC: 4 MMOL/L (ref 5–15)
AST SERPL-CCNC: 13 U/L (ref 1–32)
BILIRUB SERPL-MCNC: 0.4 MG/DL (ref 0–1.2)
BUN SERPL-MCNC: 25 MG/DL (ref 8–23)
BUN/CREAT SERPL: 26.9 (ref 7–25)
CALCIUM SPEC-SCNC: 9.9 MG/DL (ref 8.6–10.5)
CHLORIDE SERPL-SCNC: 106 MMOL/L (ref 98–107)
CO2 SERPL-SCNC: 29 MMOL/L (ref 22–29)
CREAT SERPL-MCNC: 0.93 MG/DL (ref 0.57–1)
EGFRCR SERPLBLD CKD-EPI 2021: 63.4 ML/MIN/1.73
GLOBULIN UR ELPH-MCNC: 2 GM/DL
GLUCOSE SERPL-MCNC: 85 MG/DL (ref 65–99)
POTASSIUM SERPL-SCNC: 4.3 MMOL/L (ref 3.5–5.2)
PROT SERPL-MCNC: 6.2 G/DL (ref 6–8.5)
SODIUM SERPL-SCNC: 139 MMOL/L (ref 136–145)
TSH SERPL DL<=0.05 MIU/L-ACNC: 2.46 UIU/ML (ref 0.27–4.2)

## 2024-04-15 PROCEDURE — 84443 ASSAY THYROID STIM HORMONE: CPT

## 2024-04-15 PROCEDURE — 71046 X-RAY EXAM CHEST 2 VIEWS: CPT

## 2024-04-15 PROCEDURE — 80053 COMPREHEN METABOLIC PANEL: CPT

## 2024-04-22 ENCOUNTER — OFFICE VISIT (OUTPATIENT)
Dept: CARDIOLOGY | Facility: CLINIC | Age: 79
End: 2024-04-22
Payer: MEDICARE

## 2024-04-22 VITALS
DIASTOLIC BLOOD PRESSURE: 57 MMHG | BODY MASS INDEX: 31.29 KG/M2 | HEART RATE: 54 BPM | SYSTOLIC BLOOD PRESSURE: 96 MMHG | HEIGHT: 63 IN | WEIGHT: 176.6 LBS

## 2024-04-22 DIAGNOSIS — I48.0 PAROXYSMAL ATRIAL FIBRILLATION: ICD-10-CM

## 2024-04-22 DIAGNOSIS — Z79.01 CHRONIC ANTICOAGULATION: ICD-10-CM

## 2024-04-22 DIAGNOSIS — I10 PRIMARY HYPERTENSION: Primary | ICD-10-CM

## 2024-04-22 DIAGNOSIS — I25.10 CORONARY ARTERY DISEASE INVOLVING NATIVE CORONARY ARTERY OF NATIVE HEART WITHOUT ANGINA PECTORIS: ICD-10-CM

## 2024-04-22 RX ORDER — AMIODARONE HYDROCHLORIDE 200 MG/1
100 TABLET ORAL
Qty: 30 TABLET | Refills: 5 | Status: SHIPPED | OUTPATIENT
Start: 2024-04-22

## 2024-04-22 NOTE — PROGRESS NOTES
Subjective:        Lili Ruiz is a 79 y.o. female who here for follow up    CC  Leg swelling    pci  HPI  79-year-old female with coronary artery disease hypertension atrial fibrillation with a recent PCI complaining of the leg swelling     Problems Addressed this Visit          Cardiac and Vasculature    Hypertension - Primary    Atrial fibrillation    Coronary artery disease involving native coronary artery of native heart without angina pectoris       Coag and Thromboembolic    Chronic anticoagulation     Diagnoses         Codes Comments    Primary hypertension    -  Primary ICD-10-CM: I10  ICD-9-CM: 401.9     Chronic anticoagulation     ICD-10-CM: Z79.01  ICD-9-CM: V58.61     Paroxysmal atrial fibrillation     ICD-10-CM: I48.0  ICD-9-CM: 427.31     Coronary artery disease involving native coronary artery of native heart without angina pectoris     ICD-10-CM: I25.10  ICD-9-CM: 414.01           .    The following portions of the patient's history were reviewed and updated as appropriate: allergies, current medications, past family history, past medical history, past social history, past surgical history and problem list.    Past Medical History:   Diagnosis Date    Anxiety     Aortic valve insufficiency     Arthritis     Atrial fibrillation     Breast cancer     Cancer     Breast    Cataract     bilateral eyes    Depression     Diverticulosis     Human metapneumovirus (hMPV) pneumonia     Hyperlipidemia     Hypertension     Kidney stone     Low back pain     Non Q wave myocardial infarction 10/2023    Obesity     Peptic ulceration     Visual impairment      reports that she has quit smoking. She has never used smokeless tobacco. She reports that she does not drink alcohol and does not use drugs.   Family History   Problem Relation Age of Onset    Cancer Mother     Ovarian cancer Mother     Heart disease Father     Colon cancer Maternal Aunt        Review of Systems  Constitutional: No wt loss, fever,  "fatigue  Gastrointestinal: No nausea, abdominal pain  Behavioral/Psych: No insomnia or anxiety   Cardiovascular no chest pains or tightness in the chest  Objective:       Physical Exam  BP 96/57 (BP Location: Left arm)   Pulse 54   Ht 160 cm (63\")   Wt 80.1 kg (176 lb 9.6 oz)   BMI 31.28 kg/m²   General appearance: No acute changes   Neck: Trachea midline; NECK, supple, no thyromegaly or lymphadenopathy   Lungs: Normal size and shape, normal breath sounds, equal distribution of air, no rales and rhonchi   CV: S1-S2 regular, no murmurs, no rub, no gallop   Abdomen: Soft, nontender; no masses , no abnormal abdominal sounds   Extremities: No deformity , normal color , 1+ peripheral edema   Skin: Normal temperature, turgor and texture; no rash, ulcers            ECG 12 Lead    Date/Time: 4/22/2024 10:54 AM  Performed by: Teo Orta MD    Authorized by: Teo Orta MD  Comparison: compared with previous ECG   Similar to previous ECG  Rhythm: sinus rhythm    Clinical impression: non-specific ECG            Echocardiogram:    Results for orders placed during the hospital encounter of 12/07/23    Adult Transthoracic Echo Complete W/ Cont if Necessary Per Protocol    Interpretation Summary    Left ventricular ejection fraction appears to be 56 - 60%.    Left ventricular diastolic function was normal.    Estimated right ventricular systolic pressure from tricuspid regurgitation is normal (<35 mmHg).          Current Outpatient Medications:     amiodarone (PACERONE) 200 MG tablet, Take 0.5 tablets by mouth Daily., Disp: 30 tablet, Rfl: 5    apixaban (ELIQUIS) 5 MG tablet tablet, Take 1 tablet by mouth 2 (Two) Times a Day., Disp: 60 tablet, Rfl: 11    atorvastatin (LIPITOR) 40 MG tablet, Take 1 tablet by mouth Daily., Disp: 30 tablet, Rfl: 5    Calcium Carbonate (CALCIUM 600 PO), Take 1 tablet by mouth 2 (Two) Times a Day., Disp: , Rfl:     clopidogrel (PLAVIX) 75 MG tablet, Take 1 tablet by mouth " Daily., Disp: 30 tablet, Rfl: 6    magnesium oxide (MAG-OX) 400 MG tablet, Take 1 tablet by mouth Daily., Disp: , Rfl:     metoprolol succinate XL (TOPROL-XL) 25 MG 24 hr tablet, Take 1 tablet by mouth Daily., Disp: 30 tablet, Rfl: 11    nitroglycerin (NITROSTAT) 0.4 MG SL tablet, Place 1 tablet under the tongue Every 5 (Five) Minutes As Needed for Chest Pain (Systolic BP Greater Than 100). Take no more than 3 doses in 15 minutes., Disp: 25 tablet, Rfl: 12    Probiotic Product (PROBIOTIC DAILY PO), Take 1 tablet by mouth 2 (Two) Times a Day., Disp: , Rfl:     sacubitril-valsartan (Entresto) 24-26 MG tablet, Take 1 tablet by mouth 2 (Two) Times a Day., Disp: 60 tablet, Rfl: 11    Unable to find, Take 1 each by mouth 1 (One) Time. IB Valerio 2 Tables Daily, Disp: , Rfl:     vitamin B-12 (CYANOCOBALAMIN) 500 MCG tablet, Take 1 tablet by mouth Daily., Disp: , Rfl:     vitamin C (ASCORBIC ACID) 250 MG tablet, Take 2 tablets by mouth Daily., Disp: , Rfl:    Assessment:                Plan:          ICD-10-CM ICD-9-CM   1. Primary hypertension  I10 401.9   2. Chronic anticoagulation  Z79.01 V58.61   3. Paroxysmal atrial fibrillation  I48.0 427.31   4. Coronary artery disease involving native coronary artery of native heart without angina pectoris  I25.10 414.01     1. Primary hypertension  Continue metoprolol    2. Chronic anticoagulation  No side effects with Eliquis    3. Paroxysmal atrial fibrillation  Continue amiodarone    4. Coronary artery disease involving native coronary artery of native heart without angina pectoris  Continue Plavix and beta-blocker      Leg swelling    See in 3 months when plavix will be changed to asa  COUNSELING:    Lili Yu was given to patient for the following topics: diagnostic results, risk factor reductions, impressions, risks and benefits of treatment options and importance of treatment compliance .       SMOKING COUNSELING:        Dictated using Dragon dictation

## 2024-05-04 PROBLEM — I25.10 CORONARY ARTERY DISEASE INVOLVING NATIVE CORONARY ARTERY OF NATIVE HEART WITHOUT ANGINA PECTORIS: Status: ACTIVE | Noted: 2023-11-07

## 2024-05-13 RX ORDER — CLOPIDOGREL BISULFATE 75 MG/1
75 TABLET ORAL DAILY
Qty: 90 TABLET | Refills: 1 | Status: SHIPPED | OUTPATIENT
Start: 2024-05-13 | End: 2024-05-14 | Stop reason: SDUPTHER

## 2024-05-14 RX ORDER — CLOPIDOGREL BISULFATE 75 MG/1
75 TABLET ORAL DAILY
Qty: 90 TABLET | Refills: 1 | Status: SHIPPED | OUTPATIENT
Start: 2024-05-14

## 2024-06-27 RX ORDER — ATORVASTATIN CALCIUM 40 MG/1
40 TABLET, FILM COATED ORAL DAILY
Qty: 30 TABLET | Refills: 5 | Status: SHIPPED | OUTPATIENT
Start: 2024-06-27

## 2024-07-22 ENCOUNTER — OFFICE VISIT (OUTPATIENT)
Dept: CARDIOLOGY | Facility: CLINIC | Age: 79
End: 2024-07-22
Payer: MEDICARE

## 2024-07-22 VITALS
HEIGHT: 63 IN | DIASTOLIC BLOOD PRESSURE: 64 MMHG | HEART RATE: 57 BPM | SYSTOLIC BLOOD PRESSURE: 112 MMHG | BODY MASS INDEX: 31.18 KG/M2 | WEIGHT: 176 LBS

## 2024-07-22 DIAGNOSIS — I10 PRIMARY HYPERTENSION: ICD-10-CM

## 2024-07-22 DIAGNOSIS — I48.0 PAROXYSMAL ATRIAL FIBRILLATION: ICD-10-CM

## 2024-07-22 DIAGNOSIS — Z79.01 CHRONIC ANTICOAGULATION: ICD-10-CM

## 2024-07-22 DIAGNOSIS — I25.708 CORONARY ARTERY DISEASE OF BYPASS GRAFT OF NATIVE HEART WITH STABLE ANGINA PECTORIS: Primary | ICD-10-CM

## 2024-07-22 RX ORDER — ASPIRIN 81 MG/1
81 TABLET, CHEWABLE ORAL ONCE
Status: SHIPPED | OUTPATIENT
Start: 2024-07-22

## 2024-07-22 NOTE — PROGRESS NOTES
6 mo follow up   Subjective:        Lili Ruiz is a 79 y.o. female who here for follow up    CC  Follow-up coronary artery disease hypertension atrial fibrillation  HPI  79-year-old female with coronary artery disease atrial fibrillation hypertension chronic anticoagulation here for the follow-up with no chest pains or tightness in the chest     Problems Addressed this Visit          Cardiac and Vasculature    Hypertension    Atrial fibrillation    Coronary artery disease of bypass graft of native heart with stable angina pectoris - Primary    Relevant Medications    aspirin chewable tablet 81 mg       Coag and Thromboembolic    Chronic anticoagulation     Diagnoses         Codes Comments    Coronary artery disease of bypass graft of native heart with stable angina pectoris    -  Primary ICD-10-CM: I25.708  ICD-9-CM: 414.05, 413.9     Primary hypertension     ICD-10-CM: I10  ICD-9-CM: 401.9     Chronic anticoagulation     ICD-10-CM: Z79.01  ICD-9-CM: V58.61     Paroxysmal atrial fibrillation     ICD-10-CM: I48.0  ICD-9-CM: 427.31           .    The following portions of the patient's history were reviewed and updated as appropriate: allergies, current medications, past family history, past medical history, past social history, past surgical history and problem list.    Past Medical History:   Diagnosis Date    Anxiety     Aortic valve insufficiency     Arthritis     Atrial fibrillation     Breast cancer     Cancer     Breast    Cataract     bilateral eyes    Depression     Diverticulosis     Human metapneumovirus (hMPV) pneumonia     Hyperlipidemia     Hypertension     Kidney stone     Low back pain     Non Q wave myocardial infarction 10/2023    Obesity     Peptic ulceration     Visual impairment      reports that she has quit smoking. She has never used smokeless tobacco. She reports that she does not drink alcohol and does not use drugs.   Family History   Problem Relation Age of Onset    Cancer Mother      "Ovarian cancer Mother     Heart disease Father     Colon cancer Maternal Aunt        Review of Systems  Constitutional: No wt loss, fever, fatigue  Gastrointestinal: No nausea, abdominal pain  Behavioral/Psych: No insomnia or anxiety   Cardiovascular no chest pains or tightness in the chest  Objective:       Physical Exam  /64   Pulse 57   Ht 160 cm (62.99\")   Wt 79.8 kg (176 lb)   BMI 31.18 kg/m²   General appearance: No acute changes   Neck: Trachea midline; NECK, supple, no thyromegaly or lymphadenopathy   Lungs: Normal size and shape, normal breath sounds, equal distribution of air, no rales and rhonchi   CV: S1-S2 regular, no murmurs, no rub, no gallop   Abdomen: Soft, nontender; no masses , no abnormal abdominal sounds   Extremities: No deformity , normal color , no peripheral edema   Skin: Normal temperature, turgor and texture; no rash, ulcers            ECG 12 Lead    Date/Time: 7/22/2024 9:49 AM  Performed by: Teo Orta MD    Authorized by: Teo Orta MD  Comparison: compared with previous ECG   Similar to previous ECG  Rhythm: sinus rhythm  Conduction: non-specific intraventricular conduction delay    Clinical impression: abnormal EKG            Echocardiogram:    Results for orders placed during the hospital encounter of 12/07/23    Adult Transthoracic Echo Complete W/ Cont if Necessary Per Protocol    Interpretation Summary    Left ventricular ejection fraction appears to be 56 - 60%.    Left ventricular diastolic function was normal.    Estimated right ventricular systolic pressure from tricuspid regurgitation is normal (<35 mmHg).          Current Outpatient Medications:     amiodarone (PACERONE) 200 MG tablet, Take 0.5 tablets by mouth Daily., Disp: 30 tablet, Rfl: 5    apixaban (ELIQUIS) 5 MG tablet tablet, Take 1 tablet by mouth 2 (Two) Times a Day., Disp: 60 tablet, Rfl: 11    atorvastatin (LIPITOR) 40 MG tablet, TAKE 1 TABLET BY MOUTH DAILY, Disp: 30 tablet, " Rfl: 5    Calcium Carbonate (CALCIUM 600 PO), Take 1 tablet by mouth 2 (Two) Times a Day., Disp: , Rfl:     magnesium oxide (MAG-OX) 400 MG tablet, Take 1 tablet by mouth Daily., Disp: , Rfl:     metoprolol succinate XL (TOPROL-XL) 25 MG 24 hr tablet, Take 1 tablet by mouth Daily., Disp: 30 tablet, Rfl: 11    nitroglycerin (NITROSTAT) 0.4 MG SL tablet, Place 1 tablet under the tongue Every 5 (Five) Minutes As Needed for Chest Pain (Systolic BP Greater Than 100). Take no more than 3 doses in 15 minutes., Disp: 25 tablet, Rfl: 12    Probiotic Product (PROBIOTIC DAILY PO), Take 1 tablet by mouth 2 (Two) Times a Day., Disp: , Rfl:     sacubitril-valsartan (Entresto) 24-26 MG tablet, Take 1 tablet by mouth 2 (Two) Times a Day., Disp: 60 tablet, Rfl: 11    vitamin B-12 (CYANOCOBALAMIN) 500 MCG tablet, Take 1 tablet by mouth Daily., Disp: , Rfl:     vitamin C (ASCORBIC ACID) 250 MG tablet, Take 2 tablets by mouth Daily., Disp: , Rfl:     Unable to find, Take 1 each by mouth 1 (One) Time. IB Valerio 2 Tables Daily, Disp: , Rfl:     Current Facility-Administered Medications:     aspirin chewable tablet 81 mg, 81 mg, Oral, Once, Teo Orta MD   Assessment:                Plan:          ICD-10-CM ICD-9-CM   1. Coronary artery disease of bypass graft of native heart with stable angina pectoris  I25.708 414.05     413.9   2. Primary hypertension  I10 401.9   3. Chronic anticoagulation  Z79.01 V58.61   4. Paroxysmal atrial fibrillation  I48.0 427.31     1. Coronary artery disease of bypass graft of native heart with stable angina pectoris  No angina pectoris  - aspirin chewable tablet 81 mg    2. Primary hypertension  Patient understands importance of blood pressure check at home which patient does regularly and the blood pressures are well under control to the level of less than 140/90      3. Chronic anticoagulation  Pros and cons as well as indication of the anticoagulation has been explained to the patient in  detail    There are no obvious complications at this stage    Risk of  the bleedings has been explained    Need for the regular blood workup and adjust the dose has been explained    Need for proper follow-up on anticoagulation also has been explained      4. Paroxysmal atrial fibrillation  Under control      Post pci stable    Dc plavix    Start asa    See in 1 yr  COUNSELING:    Lili Yu was given to patient for the following topics: diagnostic results, risk factor reductions, impressions, risks and benefits of treatment options and importance of treatment compliance .       SMOKING COUNSELING:        Dictated using Dragon dictation

## 2024-09-03 ENCOUNTER — TRANSCRIBE ORDERS (OUTPATIENT)
Dept: ADMINISTRATIVE | Facility: HOSPITAL | Age: 79
End: 2024-09-03
Payer: MEDICARE

## 2024-09-03 DIAGNOSIS — Z12.31 BREAST CANCER SCREENING BY MAMMOGRAM: Primary | ICD-10-CM

## 2024-10-17 ENCOUNTER — HOSPITAL ENCOUNTER (OUTPATIENT)
Dept: MAMMOGRAPHY | Facility: HOSPITAL | Age: 79
Discharge: HOME | End: 2024-10-17
Admitting: INTERNAL MEDICINE
Payer: MEDICARE

## 2024-10-17 DIAGNOSIS — Z12.31 BREAST CANCER SCREENING BY MAMMOGRAM: ICD-10-CM

## 2024-10-17 PROCEDURE — 77063 BREAST TOMOSYNTHESIS BI: CPT

## 2024-10-17 PROCEDURE — 77067 SCR MAMMO BI INCL CAD: CPT

## 2024-10-29 ENCOUNTER — TELEPHONE (OUTPATIENT)
Dept: GASTROENTEROLOGY | Facility: CLINIC | Age: 79
End: 2024-10-29
Payer: MEDICARE

## 2024-10-29 DIAGNOSIS — R19.7 DIARRHEA OF PRESUMED INFECTIOUS ORIGIN: Primary | ICD-10-CM

## 2024-10-29 DIAGNOSIS — D64.89 OTHER SPECIFIED ANEMIAS: ICD-10-CM

## 2024-10-29 NOTE — TELEPHONE ENCOUNTER
Hub staff attempted to follow warm transfer process and was unsuccessful     Caller: Lili Ruiz A    Relationship to patient: Self    Best call back number: [phone]    Patient is needing: PT IS HAVING A LOT OF DIARRHEA AND SAID THAT DARIELA SANTOS HAD MENTIONED SOMETHING SHE COULD TAKE IF THE OTC MEDICINE WAS NOT WORKING.   CAN YOU CALL TO ADVISE HER WHAT THAT WAS, OR DOES SHE NEED TO SET AN APPT?

## 2024-10-30 NOTE — TELEPHONE ENCOUNTER
Patient called. She states she is having issues with diarrhea and explosive diarrhea multiple times through out the day. She has been taking a probiotic(siXis) since July(she rotates the brand of probiotics frequently). She states her issues are as bad as they were last year, Malorie treated her with Alinia and it worked.   Scheduled f/u appointment with Malorie on 12/24@0900. Update to Malorie.

## 2024-10-30 NOTE — TELEPHONE ENCOUNTER
Recommend she complete stool testing to rule out infectious pathogens before we move forward with treatment for IBS-D with Alinia.  If she is willing have her complete a GI PCR and a C. difficile first and then we can talk about next steps.

## 2024-10-31 ENCOUNTER — TELEPHONE (OUTPATIENT)
Dept: GASTROENTEROLOGY | Facility: CLINIC | Age: 79
End: 2024-10-31
Payer: MEDICARE

## 2024-10-31 NOTE — TELEPHONE ENCOUNTER
Hub staff attempted to follow warm transfer process and was unsuccessful     Caller: Lili Ruiz A    Relationship to patient: Self    Best call back number: [phone]    Patient is needing: PATIENT MISSED A CALL FROM MICHAEL AND IS REACHING OUT.  PLEASE CALL TO ASSIST.  THANK YOU

## 2024-10-31 NOTE — TELEPHONE ENCOUNTER
Patient called. Advised as per Malorie's note. She verb understanding.   Stool kit placed at the  for .

## 2024-11-05 ENCOUNTER — TELEPHONE (OUTPATIENT)
Dept: GASTROENTEROLOGY | Facility: CLINIC | Age: 79
End: 2024-11-05
Payer: MEDICARE

## 2024-11-05 NOTE — TELEPHONE ENCOUNTER
Called pt and advised how to collect stool samples and to deliver them to our office within 24 hours of collection. Pt verbalized understanding.

## 2024-11-05 NOTE — TELEPHONE ENCOUNTER
Hub staff attempted to follow warm transfer process and was unsuccessful     Caller: Lili Ruiz A    Relationship to patient: Self    Best call back number: [phone]     Patient is needing: PT CALLED TO GO OVER THE GASTRO PANEL INSTRUCTIONS NOT SURE IF NEED TO REFRIGERATE OR OR FREEZE STOOL SAMPLE. SHE HAS TWO TEST TO PERFORM.    PLEASE CALL TO DISCUSS FURTHER.

## 2024-11-11 ENCOUNTER — TELEPHONE (OUTPATIENT)
Dept: GASTROENTEROLOGY | Facility: CLINIC | Age: 79
End: 2024-11-11
Payer: MEDICARE

## 2024-11-11 NOTE — TELEPHONE ENCOUNTER
----- Message from Malorie Castle sent at 11/8/2024  4:05 PM EST -----  Please inform the patient stool testing is negative for infection.  Please check on symptoms.

## 2024-11-11 NOTE — TELEPHONE ENCOUNTER
Patient called. Advised as per Malorie's note. She verb understanding. She states she is still going a little bit more than usual and she switched her probiotic to W-21. Advised if she has worsening of symptoms to call back. Update to Malorie.

## 2024-11-15 RX ORDER — METOPROLOL SUCCINATE 25 MG/1
25 TABLET, EXTENDED RELEASE ORAL DAILY
Qty: 30 TABLET | Refills: 8 | Status: SHIPPED | OUTPATIENT
Start: 2024-11-15

## 2024-12-16 RX ORDER — ATORVASTATIN CALCIUM 40 MG/1
40 TABLET, FILM COATED ORAL DAILY
Qty: 30 TABLET | Refills: 8 | Status: SHIPPED | OUTPATIENT
Start: 2024-12-16

## 2024-12-16 RX ORDER — METOPROLOL SUCCINATE 25 MG/1
25 TABLET, EXTENDED RELEASE ORAL DAILY
Qty: 30 TABLET | Refills: 8 | Status: SHIPPED | OUTPATIENT
Start: 2024-12-16

## 2024-12-23 RX ORDER — SACUBITRIL AND VALSARTAN 24; 26 MG/1; MG/1
1 TABLET, FILM COATED ORAL 2 TIMES DAILY
Qty: 60 TABLET | Refills: 8 | Status: SHIPPED | OUTPATIENT
Start: 2024-12-23

## 2024-12-23 RX ORDER — APIXABAN 5 MG/1
5 TABLET, FILM COATED ORAL 2 TIMES DAILY
Qty: 60 TABLET | Refills: 8 | Status: SHIPPED | OUTPATIENT
Start: 2024-12-23

## 2024-12-24 ENCOUNTER — OFFICE VISIT (OUTPATIENT)
Dept: GASTROENTEROLOGY | Facility: CLINIC | Age: 79
End: 2024-12-24
Payer: MEDICARE

## 2024-12-24 VITALS
SYSTOLIC BLOOD PRESSURE: 106 MMHG | TEMPERATURE: 96.9 F | HEART RATE: 55 BPM | DIASTOLIC BLOOD PRESSURE: 67 MMHG | WEIGHT: 189.4 LBS | HEIGHT: 63 IN | BODY MASS INDEX: 33.56 KG/M2

## 2024-12-24 DIAGNOSIS — K58.0 IRRITABLE BOWEL SYNDROME WITH DIARRHEA: Primary | ICD-10-CM

## 2024-12-24 PROCEDURE — 3078F DIAST BP <80 MM HG: CPT | Performed by: NURSE PRACTITIONER

## 2024-12-24 PROCEDURE — 3074F SYST BP LT 130 MM HG: CPT | Performed by: NURSE PRACTITIONER

## 2024-12-24 PROCEDURE — 99214 OFFICE O/P EST MOD 30 MIN: CPT | Performed by: NURSE PRACTITIONER

## 2024-12-24 RX ORDER — TRAMADOL HYDROCHLORIDE 50 MG/1
50 TABLET ORAL 2 TIMES DAILY
COMMUNITY

## 2024-12-24 NOTE — PROGRESS NOTES
Chief Complaint   Patient presents with    Irritable bowel syndrome with diarrhea       HPI    Lili Ruiz is a  79 y.o. female here for a follow up visit for irritable bowel syndrome.      This patient follows with Dr. Galan, known to me.    Past history of anxiety, atrial fibrillation on Eliquis, breast cancer, diverticular disease, hyperlipidemia, hypertension, kidney stones along with low back pain.    Patient reports increased diarrhea symptoms over the last several months.  Stool testing was negative for infection in November.  She then realized she was taking magnesium and B12 during this timeframe which seemed to exacerbate her symptoms.  She stopped vitamin supplementation and she feels much improved on visit today.  Denies abdominal pain.  Bowels are moving 1-3 times a day on average.  Stool consistency varies which is baseline for her.  No rectal bleeding or rectal pain.  She takes IBgard as needed with benefit.  She rotates probiotics every 6 months and finds this is also helpful.    Additional data reviewed:    EGD 2021 - Nonobstructing Schatzki's ring.  Single submucosal papule found in the stomach.  Normal duodenum. Path mild esophagitis.  Negative for Murguia's.    C-scope 2021 - Mix TA/HP polyps.  Internal hemorrhoids.  Recall 5 years.    Past Medical History:   Diagnosis Date    Anxiety     Aortic valve insufficiency     Arthritis     Atrial fibrillation     Breast cancer     Cancer     Breast    Cataract     bilateral eyes    Depression     Diverticulosis     Heart attack     Human metapneumovirus (hMPV) pneumonia     Hyperlipidemia     Hypertension     Kidney stone     Low back pain     Non Q wave myocardial infarction 10/2023    Obesity     Peptic ulceration     Visual impairment        Past Surgical History:   Procedure Laterality Date    APPENDECTOMY      BREAST BIOPSY      BREAST SURGERY      CARDIAC CATHETERIZATION N/A 10/14/2023    Procedure: Left Heart Cath;  Surgeon: You  MD Teo;  Location: [facility] CATH INVASIVE LOCATION;  Service: Cardiovascular;  Laterality: N/A;    CARDIAC CATHETERIZATION N/A 10/14/2023    Procedure: Coronary angiography;  Surgeon: Teo Orta MD;  Location: [facility] CATH INVASIVE LOCATION;  Service: Cardiovascular;  Laterality: N/A;    CARDIAC CATHETERIZATION N/A 10/14/2023    Procedure: Left ventriculography;  Surgeon: Teo Orta MD;  Location: [facility] CATH INVASIVE LOCATION;  Service: Cardiovascular;  Laterality: N/A;    CARDIAC CATHETERIZATION N/A 01/11/2024    Procedure: Left Heart Cath;  Surgeon: Teo Orta MD;  Location: [facility] CATH INVASIVE LOCATION;  Service: Cardiovascular;  Laterality: N/A;    CARDIAC CATHETERIZATION N/A 01/11/2024    Procedure: Coronary angiography;  Surgeon: Teo Orta MD;  Location: [facility] CATH INVASIVE LOCATION;  Service: Cardiovascular;  Laterality: N/A;    CARDIAC CATHETERIZATION N/A 01/11/2024    Procedure: Percutaneous Coronary Intervention;  Surgeon: Teo Orta MD;  Location: [facility] CATH INVASIVE LOCATION;  Service: Cardiovascular;  Laterality: N/A;    CARDIAC CATHETERIZATION N/A 01/11/2024    Procedure: Stent JUDSON coronary;  Surgeon: Teo Orta MD;  Location: [facility] CATH INVASIVE LOCATION;  Service: Cardiovascular;  Laterality: N/A;    CHOLECYSTECTOMY      COLONOSCOPY  appox 2014    COLONOSCOPY W/ POLYPECTOMY N/A 03/10/2021    Procedure: COLONOSCOPY  TO CECUM WITH BIOPSY AND POLYPECTOMY (COLD SNARE);  Surgeon: Joel Galan MD;  Location: [facility] ENDOSCOPY;  Service: Gastroenterology;  Laterality: N/A;   DIARRHEA  POST: COLON POLYPS , HEMORRHOIDS  (COLD SNARE SUPPLY SAMPLE USED)    CORONARY ANGIOPLASTY WITH STENT PLACEMENT      ENDOSCOPY N/A 03/10/2021    Procedure: ESOPHAGOGASTRODUODENOSCOPY WITH BIOPSY;  Surgeon: Joel Galan MD;  Location: [facility] ENDOSCOPY;  Service: Gastroenterology;  Laterality: N/A;  DYSPHAGIA; DIARRHEA  POST:  SCHIATZKI'S RING; GASTRIC NODULE    GASTRIC BYPASS      INTERVENTIONAL RADIOLOGY PROCEDURE N/A 01/11/2024    Procedure: Intravascular Ultrasound;  Surgeon: Teo Orta MD;  Location: [facility] INVASIVE LOCATION;  Service: Cardiovascular;  Laterality: N/A;    MASTECTOMY Right 2015       Scheduled Meds: aspirin, 81 mg, Oral, Once        Continuous Infusions:      PRN Meds:     Allergies   Allergen Reactions    Penicillins Shortness Of Breath and Itching    Hydrocodone-Acetaminophen     Sotalol Hcl        Social History     Socioeconomic History    Marital status:    Tobacco Use    Smoking status: Former    Smokeless tobacco: Never    Tobacco comments:     50 yrs ago   Vaping Use    Vaping status: Never Used   Substance and Sexual Activity    Alcohol use: No    Drug use: Never    Sexual activity: Defer     Partners: Male     Birth control/protection: Post-menopausal       Family History   Problem Relation Age of Onset    Cancer Mother     Ovarian cancer Mother     Heart disease Father     Colon cancer Maternal Aunt        Review of Systems   HENT:  Negative for trouble swallowing.    Gastrointestinal: Negative.        Vitals:    12/24/24 0906   BP: 106/67   Pulse: 55   Temp: 96.9 °F (36.1 °C)       Physical Exam  Constitutional:       Appearance: She is well-developed.   Abdominal:      General: Bowel sounds are normal. There is no distension.      Palpations: Abdomen is soft. There is no mass.      Tenderness: There is no abdominal tenderness. There is no guarding.      Hernia: No hernia is present.   Skin:     General: Skin is warm and dry.      Capillary Refill: Capillary refill takes less than 2 seconds.   Neurological:      Mental Status: She is alert and oriented to person, place, and time.   Psychiatric:         Behavior: Behavior normal.     Assessment    Diagnoses and all orders for this visit:    1. Irritable bowel syndrome with diarrhea (Primary)       Plan    Stable IBS-D  Continue  probiotics  Continue IBgard, samples provided on visit today  High-fiber diet indefinitely  Follow-up 1 year or sooner if needed         SHANON Alonso  [facility]  [address] [place] [address]  Office: [phone]    I spent 30 minutes caring for Lili on this date of service. This time includes time spent by me in the following activities: preparing for the visit, reviewing tests, obtaining and/or reviewing a separately obtained history, performing a medically appropriate examination and/or evaluation , counseling and educating the patient/family/caregiver, ordering medications, tests, or procedures, documenting information in the medical record, independently interpreting results and communicating that information with the patient/family/caregiver and care coordination.

## 2025-05-30 RX ORDER — AMIODARONE HYDROCHLORIDE 200 MG/1
100 TABLET ORAL
Qty: 30 TABLET | Refills: 5 | OUTPATIENT
Start: 2025-05-30

## 2025-05-30 RX ORDER — AMIODARONE HYDROCHLORIDE 200 MG/1
100 TABLET ORAL
Qty: 30 TABLET | Refills: 3 | Status: SHIPPED | OUTPATIENT
Start: 2025-05-30

## 2025-07-22 ENCOUNTER — OFFICE VISIT (OUTPATIENT)
Dept: CARDIOLOGY | Facility: CLINIC | Age: 80
End: 2025-07-22
Payer: MEDICARE

## 2025-07-22 VITALS
HEIGHT: 63 IN | HEART RATE: 54 BPM | BODY MASS INDEX: 34.16 KG/M2 | WEIGHT: 192.8 LBS | DIASTOLIC BLOOD PRESSURE: 77 MMHG | SYSTOLIC BLOOD PRESSURE: 124 MMHG

## 2025-07-22 DIAGNOSIS — E78.00 PURE HYPERCHOLESTEROLEMIA: ICD-10-CM

## 2025-07-22 DIAGNOSIS — I42.0 CARDIOMYOPATHY, DILATED: ICD-10-CM

## 2025-07-22 DIAGNOSIS — I10 PRIMARY HYPERTENSION: ICD-10-CM

## 2025-07-22 DIAGNOSIS — I48.0 PAROXYSMAL ATRIAL FIBRILLATION: ICD-10-CM

## 2025-07-22 DIAGNOSIS — I25.708 CORONARY ARTERY DISEASE OF BYPASS GRAFT OF NATIVE HEART WITH STABLE ANGINA PECTORIS: ICD-10-CM

## 2025-07-22 DIAGNOSIS — Z79.899 ON AMIODARONE THERAPY: Primary | ICD-10-CM

## 2025-07-22 NOTE — PROGRESS NOTES
Subjective:        Lili Ruiz is a 80 y.o. female who here for follow up    Chief Complaint   Patient presents with    Follow-up     ANNUAL F/U        HPI    This is a 80-year-old female with paroxysmal atrial fibrillation, coronary artery disease s/p JUDSON to the LAD on 1/11/2024, hypertension, hyperlipidemia.  She was admitted 1/10/2024 through 1/12/2024 for NSTEMI, A-fib with RVR.  She was started on IV amiodarone and Lovenox anticoagulation.  She underwent cardiac catheterization 724 with angioplasty and stent to the LAD.  It was decided she was too high risk for triple therapy and was discharged home on Plavix and Eliquis, as well as amiodarone.     She is seen in office today for annual follow-up. She denies any chest paiin or shortness of breath.     Echo 1/24/2024 EF 51 to 55%, normal LV function.  Calcification of the aortic valve.    Cardiac catheterization 1/11/2024 normal left main, LAD ostial 70% stenosis reduced to 0% with JUDSON, minimal irregularity of the rest of the LAD diagonal  branches.  Circumflex nondominant normal.  RCA dominant normal        The following portions of the patient's history were reviewed and updated as appropriate: allergies, current medications, past family history, past medical history, past social history, past surgical history and problem list.    Past Medical History:   Diagnosis Date    Anxiety     Aortic valve insufficiency     Arthritis     Atrial fibrillation     Breast cancer     Cancer     Breast    Cataract     bilateral eyes    Depression     Diverticulosis     Heart attack     Human metapneumovirus (hMPV) pneumonia     Hyperlipidemia     Hypertension     Kidney stone     Low back pain     Non Q wave myocardial infarction 10/2023    Obesity     Peptic ulceration     Visual impairment          reports that she has quit smoking. She has never used smokeless tobacco. She reports that she does not drink alcohol and does not use drugs.     Family History    Problem Relation Age of Onset    Cancer Mother     Ovarian cancer Mother     Heart disease Father     Colon cancer Maternal Aunt              Objective:           Vitals and nursing note reviewed.   Constitutional:       Appearance: Well-developed.   HENT:      Head: Normocephalic.      Right Ear: External ear normal.      Left Ear: External ear normal.   Neck:      Vascular: No JVD.   Pulmonary:      Effort: Pulmonary effort is normal. No respiratory distress.      Breath sounds: Normal breath sounds. No stridor. No rales.   Cardiovascular:      Normal rate. Regular rhythm.      No gallop.    Pulses:     Intact distal pulses.   Edema:     Peripheral edema absent.   Abdominal:      General: Bowel sounds are normal. There is no distension.      Palpations: Abdomen is soft.      Tenderness: There is no abdominal tenderness. There is no guarding.   Musculoskeletal: Normal range of motion.         General: No tenderness.      Cervical back: Normal range of motion. Skin:     General: Skin is warm.   Neurological:      Mental Status: Alert and oriented to person, place, and time.      Deep Tendon Reflexes: Reflexes are normal and symmetric.   Psychiatric:         Judgment: Judgment normal.           ECG 12 Lead    Date/Time: 7/22/2025 8:47 AM  Performed by: Megan Carmona APRN    Authorized by: Megan Carmona APRN  Comparison: compared with previous ECG from 7/25/2024  Similar to previous ECG  Rhythm: sinus rhythm  BPM: 56  Conduction: 1st degree AV block and non-specific intraventricular conduction delay    Clinical impression: abnormal EKG          Interpretation Summary         Left ventricular ejection fraction appears to be 51 - 55%.    Left ventricular diastolic function was normal.    There is calcification of the aortic valve.       The left main is normal left main.  The left anterior descending artery is ostial LAD 70% reduced to 0% with 3.5/12 Xience stent, normal LAD otherwise.  The left circumflex is  nondominant and normal.  The right coronary artery is dominant and normal.  Successful stenting of the ostial left anterior descending artery, with reduction of stenosis from 70% to 0% stent used 3.5/12 Xience stent. Intravascular ultrasound suggested size as well as apposition of the stent appropriate    DAMIEN FLOW PRE..2..... POST...3...    TYPE OF LESION.....B........    RECOMMENDATIONS: Post-procedure care will focus on prevention of any ischemic events and congestive complications. Aggressive risk factor modification will be carried out.  Importance of taking dual antiplatelets for one year has been explained, risk of stent thrombosis leading to the acute MI, which carries high morbidity and mortality has been explained    Discontinuation or interruptions of these medications should be under the strict guidance of appropriate health professional  Interpretation Summary         Equivocal ECG evidence of myocardial ischemia.    Negative clinical evidence of myocardial ischemia.    Findings consistent with an equivocal ECG stress test.    Equivocal ECG evidence of myocardial ischemia. Negative clinical evidence of myocardial ischemia. Findings consistent with an equivocal ECG stress test    Interpretation Summary         WITH RARE PACS, PVCS, NSSVT       Left Ventriculography:     Estimated Ejection Fraction: 45%   Wall motion abnormalities: None   Mitral Regurgitation: None         Interpretation Summary         The left ventricular cavity is borderline dilated.    The following left ventricular wall segments are hypokinetic: mid anterior, apical anterior, mid anterolateral, apical lateral, mid inferolateral, apical inferior, mid inferior, apical septal, mid inferoseptal, apex hypokinetic and mid anteroseptal.    Left ventricular diastolic function is consistent with (grade I) impaired relaxation.    Estimated right ventricular systolic pressure from tricuspid regurgitation is mildly elevated (35-45  mmHg).      Current Outpatient Medications:     Acetaminophen (TYLENOL 8 HOUR PO), Take  by mouth As Needed., Disp: , Rfl:     amiodarone (PACERONE) 200 MG tablet, TAKE 1/2 TABLET BY MOUTH DAILY, Disp: 30 tablet, Rfl: 3    apixaban (Eliquis) 5 MG tablet tablet, TAKE 1 TABLET BY MOUTH TWICE DAILY, Disp: 60 tablet, Rfl: 8    atorvastatin (LIPITOR) 40 MG tablet, TAKE 1 TABLET BY MOUTH DAILY, Disp: 30 tablet, Rfl: 8    BABY ASPIRIN PO, Take  by mouth., Disp: , Rfl:     Famotidine (PEPCID AC PO), Take  by mouth As Needed., Disp: , Rfl:     metoprolol succinate XL (TOPROL-XL) 25 MG 24 hr tablet, TAKE 1 TABLET BY MOUTH DAILY, Disp: 30 tablet, Rfl: 8    nitroglycerin (NITROSTAT) 0.4 MG SL tablet, Place 1 tablet under the tongue Every 5 (Five) Minutes As Needed for Chest Pain (Systolic BP Greater Than 100). Take no more than 3 doses in 15 minutes., Disp: 25 tablet, Rfl: 12    Peppermint Oil (IBGARD PO), Take  by mouth. BEFORE MEALS, Disp: , Rfl:     Probiotic Product (PROBIOTIC DAILY PO), Take 1 tablet by mouth 2 (Two) Times a Day., Disp: , Rfl:     sacubitril-valsartan (Entresto) 24-26 MG tablet, TAKE 1 TABLET BY MOUTH TWICE DAILY, Disp: 60 tablet, Rfl: 8    traMADol (ULTRAM) 50 MG tablet, Take 1 tablet by mouth 2 (Two) Times a Day., Disp: , Rfl:     Calcium Carbonate (CALCIUM 600 PO), Take 1 tablet by mouth 2 (Two) Times a Day. (Patient not taking: Reported on 7/22/2025), Disp: , Rfl:     magnesium oxide (MAG-OX) 400 MG tablet, Take 1 tablet by mouth Daily. (Patient not taking: Reported on 7/22/2025), Disp: , Rfl:     Unable to find, Take 1 each by mouth 1 (One) Time. IB Valerio 2 Tables Daily (Patient not taking: Reported on 7/22/2025), Disp: , Rfl:     vitamin B-12 (CYANOCOBALAMIN) 500 MCG tablet, Take 1 tablet by mouth Daily. (Patient not taking: Reported on 7/22/2025), Disp: , Rfl:     vitamin C (ASCORBIC ACID) 250 MG tablet, Take 2 tablets by mouth Daily. (Patient not taking: Reported on 7/22/2025), Disp: , Rfl:      Current Facility-Administered Medications:     aspirin chewable tablet 81 mg, 81 mg, Oral, Once, Teo Orta MD     Assessment:        Patient Active Problem List   Diagnosis    Visual impairment    Peptic ulceration    Low back pain    Kidney stone    Hypertension    Hyperlipidemia    Diverticulosis    Depression    Cancer    Atrial fibrillation    Arthritis    Anxiety    Human metapneumovirus (hMPV) pneumonia    Paroxysmal supraventricular tachycardia    Esophageal reflux    Ductal carcinoma in situ (DCIS) of right breast    Carotid artery stenosis    Family history of malignant neoplasm of gastrointestinal tract    Family history of malignant neoplasm of genital organ, other    Mitral annular calcification    Chronic anticoagulation    Dysphagia    Aortic insufficiency    Tricuspid regurgitation    Irritable bowel syndrome with diarrhea    NSTEMI (non-ST elevated myocardial infarction)    Coronary artery disease of bypass graft of native heart with stable angina pectoris    Precordial pain    Acute non Q wave MI (myocardial infarction), initial episode of care    Cardiac arrhythmia    Type 2 myocardial infarction    Nonischemic nontraumatic myocardial injury               Plan:   1.  Coronary artery disease: s/p PCI to LAD on 1/11/24.  No chest pain.  Continue aspirin, atorvastatin, metoprolol    Risk reduction for the coronary artery disease, controlling the blood pressure, blood sugar management, cholesterol management, exercise, stress management, and proper compliance with medications and follow-up has been discussed    2. Paroxysmal atrial fibrillation: Controlled on amiodarone, continue.  ast/alt wnl. Needs Tsh and CXR.  Anticoagulated on Eliquis, continue    Significant side effects associated with AMIODARONE therapy has been explained. Pros and cons of the medications has been discussed, decision has been to continue the medication   6 months to yearly checkup for eye examination, thyroid  function test, chest x-ray and liver function test has been advised.  Patient understands well.    3. Cardiomyopathy: Stable on exam.  Continue Entresto, metoprolol.  She had a borderline elevated potassium on most recent lab draws.  That is going to be repeated by her primary care doctor.      4. Hyperlipidemia: Lipid panel 12/4/24 , HDL 81, LD 84, trig 59.  Continue atorvastatin    Risk of the hyperlipidemia, importance of the treatment has been explained. Pros and cons of the statins has been explained. Regular blood workup as well as side effects including the liver failure, myelopathy death has been explained.               COUNSELING: lselie Yu was given to patient for the following topics: diagnostic results, risk factor reductions, impressions, risks and benefits of treatment options and importance of treatment compliance.        SMOKING COUNSELING: denies    Follow-up in 1 year or sooner if needed    Sincerely,   SHANON Norwood  [facility]  07/22/25  08:40 EDT    EMR Dragon/Transcription disclaimer:   Much of this encounter note is an electronic transcription/translation of spoken language to printed text. The electronic translation of spoken language may permit erroneous, or at times, nonsensical words or phrases to be inadvertently transcribed; Although I have reviewed the note for such errors, some may still exist.

## 2025-07-28 ENCOUNTER — HOSPITAL ENCOUNTER (OUTPATIENT)
Dept: GENERAL RADIOLOGY | Facility: HOSPITAL | Age: 80
Discharge: HOME | End: 2025-07-28
Payer: MEDICARE

## 2025-07-28 ENCOUNTER — LAB (OUTPATIENT)
Dept: LAB | Facility: HOSPITAL | Age: 80
End: 2025-07-28
Payer: MEDICARE

## 2025-07-28 DIAGNOSIS — Z79.899 ON AMIODARONE THERAPY: ICD-10-CM

## 2025-07-28 LAB — TSH SERPL DL<=0.05 MIU/L-ACNC: 2.12 UIU/ML (ref 0.27–4.2)

## 2025-07-28 PROCEDURE — 71046 X-RAY EXAM CHEST 2 VIEWS: CPT

## 2025-07-28 PROCEDURE — 36415 COLL VENOUS BLD VENIPUNCTURE: CPT

## 2025-07-28 PROCEDURE — 84443 ASSAY THYROID STIM HORMONE: CPT

## 2025-07-30 ENCOUNTER — TELEPHONE (OUTPATIENT)
Dept: CARDIOLOGY | Facility: CLINIC | Age: 80
End: 2025-07-30
Payer: MEDICARE

## 2025-07-30 NOTE — TELEPHONE ENCOUNTER
----- Message from Aure Killian sent at 7/29/2025  4:18 PM EDT -----  Let her know that her TSH normal levels are okay and her chest x-ray showed her lungs are clear. No findings suggestive of amiodarone-induced lung injury.     She will follow-up with her primary care physician regarding multiple benign calcified lymph nodes.    Thanks Siddhartha DE LA TORRE SHE UNDERSTOOD AND AGREED

## (undated) DEVICE — THE TORRENT IRRIGATION SCOPE CONNECTOR IS USED WITH THE TORRENT IRRIGATION TUBING TO PROVIDE IRRIGATION FLUIDS SUCH AS STERILE WATER DURING GASTROINTESTINAL ENDOSCOPIC PROCEDURES WHEN USED IN CONJUNCTION WITH AN IRRIGATION PUMP (OR ELECTROSURGICAL UNIT).: Brand: TORRENT

## (undated) DEVICE — CATH DIAG IMPULSE FR4 5F 100CM

## (undated) DEVICE — DEV INDEFLATOR P/N 580289

## (undated) DEVICE — KT MANIFLD CARDIAC

## (undated) DEVICE — ADAPT CLN BIOGUARD AIR/H2O DISP

## (undated) DEVICE — CANN O2 ETCO2 FITS ALL CONN CO2 SMPL A/ 7IN DISP LF

## (undated) DEVICE — SENSR O2 OXIMAX FNGR A/ 18IN NONSTR

## (undated) DEVICE — KT ORCA ORCAPOD DISP STRL

## (undated) DEVICE — GW EMR FIX EXCHG J STD .035 3MM 260CM

## (undated) DEVICE — CATH DIAG IMPULSE FL3.5 5F 100CM

## (undated) DEVICE — PK CATH CARD 40

## (undated) DEVICE — GLIDESHEATH SLENDER STAINLESS STEEL KIT: Brand: GLIDESHEATH SLENDER

## (undated) DEVICE — BITEBLOCK OMNI BLOC

## (undated) DEVICE — RUNTHROUGH NS EXTRA FLOPPY PTCA GUIDEWIRE: Brand: RUNTHROUGH

## (undated) DEVICE — MSK PROC CURAPLEX O2 2/ADAPT 7FT

## (undated) DEVICE — GUIDE CATHETER: Brand: MACH1™

## (undated) DEVICE — LN SMPL CO2 SHTRM SD STREAM W/M LUER

## (undated) DEVICE — FRCP BX RADJAW4 NDL 2.8 240CM LG OG BX40

## (undated) DEVICE — TR BAND RADIAL ARTERY COMPRESSION DEVICE: Brand: TR BAND

## (undated) DEVICE — THE SINGLE USE ETRAP – POLYP TRAP IS USED FOR SUCTION RETRIEVAL OF ENDOSCOPICALLY REMOVED POLYPS.: Brand: ETRAP

## (undated) DEVICE — TUBING, SUCTION, 1/4" X 10', STRAIGHT: Brand: MEDLINE

## (undated) DEVICE — CORONARY IMAGING CATHETER: Brand: OPTICROSS™ 6 HD